# Patient Record
Sex: FEMALE | Race: WHITE | Employment: UNEMPLOYED | URBAN - METROPOLITAN AREA
[De-identification: names, ages, dates, MRNs, and addresses within clinical notes are randomized per-mention and may not be internally consistent; named-entity substitution may affect disease eponyms.]

---

## 2017-01-18 ENCOUNTER — TELEPHONE (OUTPATIENT)
Dept: NEUROLOGY | Facility: CLINIC | Age: 62
End: 2017-01-18

## 2017-01-18 NOTE — TELEPHONE ENCOUNTER
Reason for Call:  Patient is calling in states that she has had a headache since Monday  Please advise recommendations/suggestions on how to get rid of it    Detailed comments: see above    Phone Number Patient can be reached at: Home number on file [phone] (home)    Best Time: any    Can we leave a detailed message on this number? YES    Call taken on 1/18/2017 at 3:31 PM by Angelina Kuhn

## 2017-01-18 NOTE — TELEPHONE ENCOUNTER
"Returned call to Patient:  S-(situation): Headache    B-(background): Last office visit 4/13/16    A-(assessment): Patient reports headache since Monday. Patient reports is taking Topamax BID, has not taken Imitrex. Patient reports light sensitivity, sick to stomach, vomiting, Patient states \"the symptoms I'm having are not abnormal for me when I have a headache.\" Patient states \"this is worst it's been in many year.\" Patient denies vision changes. Patient reports neck and back have been sore, but can not identify a trigger with this headache.     R-(recommendations): Patient has Imitrex ordered by Dr. Lyons, advised Patient to try Imitrex. Notified would route to provider if further recommendations. Recommended if severe pain or vision changes before hears back from clinic to be seen in ED. Pt verbalized understanding and in agreement with plan.   Thank you,  Tequila Nieves  OB[facility]N, [facility] RN        "

## 2017-01-20 ENCOUNTER — OFFICE VISIT (OUTPATIENT)
Dept: FAMILY MEDICINE | Facility: CLINIC | Age: 62
End: 2017-01-20
Payer: COMMERCIAL

## 2017-01-20 VITALS
BODY MASS INDEX: 33.06 KG/M2 | SYSTOLIC BLOOD PRESSURE: 123 MMHG | DIASTOLIC BLOOD PRESSURE: 73 MMHG | OXYGEN SATURATION: 99 % | HEART RATE: 77 BPM | HEIGHT: 60 IN | WEIGHT: 168.4 LBS | TEMPERATURE: 98.1 F

## 2017-01-20 DIAGNOSIS — J32.9 VIRAL SINUSITIS: Primary | ICD-10-CM

## 2017-01-20 DIAGNOSIS — B97.89 VIRAL SINUSITIS: Primary | ICD-10-CM

## 2017-01-20 PROCEDURE — 99213 OFFICE O/P EST LOW 20 MIN: CPT | Performed by: INTERNAL MEDICINE

## 2017-01-20 NOTE — PATIENT INSTRUCTIONS
Your sinus infection may be from a viral infection and may clear up on its own.  Call on Tuesday if your symptoms are still going on, and then we would be more concerned that it is caused from a bacteria and we will send in an antibiotic.

## 2017-01-20 NOTE — PROGRESS NOTES
SUBJECTIVE:                                                    Farzana Hughes is a 61 year old female who presents to clinic today for the following health issues:      ENT Symptoms             Symptoms: cc Present Absent Comment   Fever/Chills   x    Fatigue  x     Muscle Aches   x    Eye Irritation  x     Sneezing  x     Nasal Floyd/Drg  x     Sinus Pressure/Pain x x  Mostly on right   Loss of smell  x     Dental pain   x    Sore Throat   x    Swollen Glands   x    Ear Pain/Fullness  x  Some right side ear pain/fullness   Cough  x  Not too productive   Wheeze   x    Chest Pain   x    Shortness of breath  x  Due to asthma    Rash   x    Other  x  Headache      Symptom duration:  x 5 days    Symptom severity:     Treatments tried:  Flonase, Zyrtec D, salt water nasal rinse and gargle    Contacts:  family w/ similar symptoms        Problem list and histories reviewed & adjusted, as indicated.  Additional history: as documented    Current Outpatient Prescriptions   Medication Sig Dispense Refill     lisinopril (PRINIVIL/ZESTRIL) 20 MG tablet Take 0.5 tablets (10 mg) by mouth daily 45 tablet 3     cetirizine-psuedoePHEDrine (ZYRTEC-D) 5-120 MG per 12 hr tablet Take 1 tablet by mouth 2 times daily 90 tablet 5     montelukast (SINGULAIR) 10 MG tablet Take 1 tablet (10 mg) by mouth as needed at bedtime. 90 tablet 2     DoxePIN (SINEQUAN) 10 MG capsule Take 1 capsule (10 mg) by mouth At Bedtime 90 capsule 3     topiramate (TOPAMAX) 25 MG tablet Take 1 tablet (25 mg) by mouth 2 times daily 60 tablet 11     atorvastatin (LIPITOR) 40 MG tablet Take 1 tablet (40 mg) by mouth daily 90 tablet 3     fluticasone (FLONASE) 50 MCG/ACT nasal spray Spray 2 sprays into both nostrils daily 16 g 1     omeprazole (PRILOSEC) 40 MG capsule Take 1 capsule (40 mg) by mouth daily Take 30-60 minutes before a meal. 90 capsule 1     GARLIC 1000 MG OR CAPS 1 tablet twice daily       albuterol (ALBUTEROL) 108 (90 BASE) MCG/ACT inhaler Inhale 2 puffs  into the lungs every 4 hours as needed 1 Inhaler 1     benzonatate (TESSALON) 200 MG capsule Take 1 capsule (200 mg) by mouth 3 times daily as needed for cough 21 capsule 0     blood glucose monitoring (NO BRAND SPECIFIED) test strip by In Vitro route 2 times daily 1 Box 12     blood glucose monitoring (DARLENE CONTOUR MONITOR) meter device kit Use to test blood sugars two times daily or as directed. 1 kit 0     blood glucose monitoring (DARLENE CONTOUR) test strip Use to test blood sugars two times daily or as directed. 1 Box 11     blood glucose monitoring (DARLENE MICROLET) lancets Use to test blood sugar two times daily or as directed. 1 Box 11     order for DME Hinged knee brace - medium 1 Device 0     order for DME CAM walker - short 1 Device 0     order for DME Equipment being ordered: Hinged knee brace 1 Units 0     order for DME Trilok Ankle Brace 1 Device 0     order for DME Crutches 1 Units 0     olopatadine (PATANOL) 0.1 % ophthalmic solution Place 1 drop into both eyes 2 times daily 5 mL 3     SUMAtriptan (IMITREX) 100 MG tablet Take 1 tablet (100 mg) by mouth See Admin Instructions WITH ONSET OF MIGRAINE, MAY REPEAT ONCE AFTER 2 HOURS. DO NOT EXCEED 2 TABLETS IN 24 HOURS. 16 tablet 6     ASPIRIN NOT PRESCRIBED (INTENTIONAL) Antiplatelet medication not prescribed intentionally due to Allergy 0 each 0     scopolamine (TRANSDERM) (1.5mg base/3day) patch Place 1 patch onto the skin every 72 hours.  Apply to hairless area behind one ear at least 4 hours before travel.  Remove old patch and change every 3 days. 3 patch 0     levalbuterol (XOPENEX) 1.25 MG/3ML nebulizer solution Take 3 mLs (1.25 mg) by nebulization every 4 hours as needed 270 mL 1     scopolamine (TRANSDERM-SCOP) 1.5 MG patch 72 hr Place onto the skin every 72 hours 4 patch 1     beclomethasone (QVAR) 40 MCG/ACT Inhaler Inhale 2 puffs into the lungs 2 times daily 1 Inhaler 8     blood glucose monitoring (NO BRAND SPECIFIED) meter device kit Use to  "test blood sugars two times daily or as directed. 1 kit 0     ONE TOUCH LANCETS MISC 90 Devices 2 times daily 1 Box 0     ORDER FOR DME BP cuff, brand as covered by insurance.  Dx: HTN 1 each 0     Blood Glucose Monitoring Suppl (ACCU-CHEK ELLA) KIT 1 Device daily. 1 kit 0     Allergies   Allergen Reactions     Shellfish Allergy Anaphylaxis     Actifed Plus [Actifed Cold-Sinus]      Advair Diskus Cough     Asa [Aspirin] Nausea     Ok to take 81 mg states larger dose makes her ill 2/17/11     Cephalosporins Nausea and Vomiting     Keflex     Codeine      Dust Mite Extract      Latex      Mildew      Milk Products Swelling     Mold      Peanut [Peanut Oil]      Vioxx Other (See Comments)     Seizures         ROS:  Constitutional, HEENT, pulmonary systems are negative, except as otherwise noted.    OBJECTIVE:                                                    /73 mmHg  Pulse 77  Temp(Src) 98.1  F (36.7  C) (Tympanic)  Ht 4' 11.5\" (1.511 m)  Wt 168 lb 6.4 oz (76.386 kg)  BMI 33.46 kg/m2  SpO2 99%  Body mass index is 33.46 kg/(m^2).    GENERAL: healthy, alert and no distress  EYES: Eyes grossly normal to inspection, PERRL and conjunctivae and sclerae normal  HENT: ear canals and TMs normal, sinuses quite tender to percussion especially on the right side, nose and mouth without ulcers or lesions, poor dentition, oropharynx red but no tonsillar exudates  NECK: no adenopathy, no asymmetry, masses, or scars  RESP: lungs clear to auscultation - no rales, rhonchi or wheezes  CV: regular rate and rhythm, normal S1 S2, no S3 or S4, no murmur, click or rub      Diagnostic Test Results:  none      ASSESSMENT/PLAN:                                                        1. Viral sinusitis    Farzana has had 5 days of sinus symptoms, so symptoms are likely viral at this point.  If not resolved by next Tuesday, would be more concerned about bacterial etiology.  Asked her to call if not better and I would send in an " Augmentin prescription at that time.  In the meantime, continue OTCs as she is doing.    Follow-up as needed.    Meño Donald MD  [facility]

## 2017-01-20 NOTE — NURSING NOTE
"Chief Complaint   Patient presents with     Sinus Problem     x 5 days, pressure above eyes, a lot of mucus       Initial /73 mmHg  Pulse 77  Temp(Src) 98.1  F (36.7  C) (Tympanic)  Ht 4' 11.5\" (1.511 m)  Wt 168 lb 6.4 oz (76.386 kg)  BMI 33.46 kg/m2  SpO2 99% Estimated body mass index is 33.46 kg/(m^2) as calculated from the following:    Height as of this encounter: 4' 11.5\" (1.511 m).    Weight as of this encounter: 168 lb 6.4 oz (76.386 kg).  BP completed using cuff size: bipin ELIZABETH CMA (AAMA)        "

## 2017-01-20 NOTE — MR AVS SNAPSHOT
"              After Visit Summary   1/20/2017    Farzana Hughes    MRN: [id number]           Patient Information     Date Of Birth          1955        Visit Information        Provider Department      1/20/2017 3:20 PM Meño Donald MD [facility]        Care Instructions    Your sinus infection may be from a viral infection and may clear up on its own.  Call on Tuesday if your symptoms are still going on, and then we would be more concerned that it is caused from a bacteria and we will send in an antibiotic.          Follow-ups after your visit        Your next 10 appointments already scheduled     Apr 05, 2017 11:00 AM   [facility]    [address] [address] [phone]              Who to contact     If you have questions or need follow up information about today's clinic visit or your schedule please contact [facility] directly at [phone].  Normal or non-critical lab and imaging results will be communicated to you by [employer], letter or phone within 4 business days after the clinic has received the results. If you do not hear from us within 7 days, please contact the clinic through [facility] or phone. If you have a critical or abnormal lab result, we will notify you by phone as soon as possible.  Submit refill requests through [employer] or call your pharmacy and they will forward the refill request to us. Please allow 3 business days for your refill to be completed.          Additional Information About Your Visit        Whyville[facility]romium Information     [employer] lets you send messages to your doctor, view your test results, renew your prescriptions, schedule appointments and more. To sign up, go to [email or web address] . Click on \"Log in\" on the left side of the screen, which will take you to the Welcome page. Then click on \"Sign up Now\" on the right side of the page.     You will " "be asked to enter the access code listed below, as well as some personal information. Please follow the directions to create your username and password.     Your access code is: [email or web address]  Expires: 2017  3:36 PM     Your access code will  in 90 days. If you need help or a new code, please call your [facility] or [phone].        Care EveryWhere ID     This is your Care EveryWhere ID. This could be used by other organizations to access your [facility] medical records  [id number]        Your Vitals Were     Pulse Temperature Height BMI (Body Mass Index) Pulse Oximetry       77 98.1  F (36.7  C) (Tympanic) 4' 11.5\" (1.511 m) 33.46 kg/m2 99%        Blood Pressure from Last 3 Encounters:   17 123/73   16 113/77   10/12/16 103/72    Weight from Last 3 Encounters:   17 168 lb 6.4 oz (76.386 kg)   16 169 lb 9.6 oz (76.93 kg)   10/12/16 173 lb (78.472 kg)              Today, you had the following     No orders found for display       Primary Care Provider Office Phone # Fax #    Oly Cannon DREW [facility] [phone]       [facility] [address] [address]        Thank you!     Thank you for choosing [facility]  for your care. Our goal is always to provide you with excellent care. Hearing back from our patients is one way we can continue to improve our services. Please take a few minutes to complete the written survey that you may receive in the mail after your visit with us. Thank you!             Your Updated Medication List - Protect others around you: Learn how to safely use, store and throw away your medicines at www.disposemymeds.org.          This list is accurate as of: 17  3:36 PM.  Always use your most recent med list.                   Brand Name Dispense Instructions for use    * ACCU-CHEK ELLA Kit     1 kit    1 Device daily.       * blood glucose monitoring meter device kit    no brand specified    1 kit    Use " to test blood sugars two times daily or as directed.       * blood glucose monitoring meter device kit     1 kit    Use to test blood sugars two times daily or as directed.       albuterol 108 (90 BASE) MCG/ACT Inhaler    albuterol    1 Inhaler    Inhale 2 puffs into the lungs every 4 hours as needed       ASPIRIN NOT PRESCRIBED    INTENTIONAL    0 each    Antiplatelet medication not prescribed intentionally due to Allergy       atorvastatin 40 MG tablet    LIPITOR    90 tablet    Take 1 tablet (40 mg) by mouth daily       beclomethasone 40 MCG/ACT Inhaler    QVAR    1 Inhaler    Inhale 2 puffs into the lungs 2 times daily       benzonatate 200 MG capsule    TESSALON    21 capsule    Take 1 capsule (200 mg) by mouth 3 times daily as needed for cough       * blood glucose monitoring test strip    no brand specified    1 Box    by In Vitro route 2 times daily       * blood glucose monitoring test strip    DARLENE CONTOUR    1 Box    Use to test blood sugars two times daily or as directed.       cetirizine-psuedoePHEDrine 5-120 MG per 12 hr tablet    zyrTEC-D    90 tablet    Take 1 tablet by mouth 2 times daily       doxepin 10 MG capsule    SINEquan    90 capsule    Take 1 capsule (10 mg) by mouth At Bedtime       fluticasone 50 MCG/ACT spray    FLONASE    16 g    Spray 2 sprays into both nostrils daily       Garlic 1000 MG Caps      1 tablet twice daily       levalbuterol 1.25 MG/3ML neb solution    XOPENEX    270 mL    Take 3 mLs (1.25 mg) by nebulization every 4 hours as needed       lisinopril 20 MG tablet    PRINIVIL/ZESTRIL    45 tablet    Take 0.5 tablets (10 mg) by mouth daily       montelukast 10 MG tablet    SINGULAIR    90 tablet    Take 1 tablet (10 mg) by mouth as needed at bedtime.       olopatadine 0.1 % ophthalmic solution    PATANOL    5 mL    Place 1 drop into both eyes 2 times daily       omeprazole 40 MG capsule    priLOSEC    90 capsule    Take 1 capsule (40 mg) by mouth daily Take 30-60 minutes  before a meal.       * ONE TOUCH LANCETS Misc     1 Box    90 Devices 2 times daily       * blood glucose monitoring lancets     1 Box    Use to test blood sugar two times daily or as directed.       order for DME     1 each    BP cuff, brand as covered by insurance.  Dx: HTN       * order for DME     1 Units    Crutches       * order for DME     1 Device    Trilok Ankle Brace       * order for DME     1 Units    Equipment being ordered: Hinged knee brace       * order for DME     1 Device    CAM walker - short       * order for DME     1 Device    Hinged knee brace - medium       * scopolamine 1.5 MG patch 72 hr    TRANSDERM-SCOP    4 patch    Place onto the skin every 72 hours       * scopolamine 72 hr patch    TRANSDERM    3 patch    Place 1 patch onto the skin every 72 hours.  Apply to hairless area behind one ear at least 4 hours before travel.  Remove old patch and change every 3 days.       SUMAtriptan 100 MG tablet    IMITREX    16 tablet    Take 1 tablet (100 mg) by mouth See Admin Instructions WITH ONSET OF MIGRAINE, MAY REPEAT ONCE AFTER 2 HOURS. DO NOT EXCEED 2 TABLETS IN 24 HOURS.       topiramate 25 MG tablet    TOPAMAX    60 tablet    Take 1 tablet (25 mg) by mouth 2 times daily       * Notice:  This list has 14 medication(s) that are the same as other medications prescribed for you. Read the directions carefully, and ask your doctor or other care provider to review them with you.

## 2017-01-23 NOTE — TELEPHONE ENCOUNTER
Pt notified.  Pt also stated she is feeling much better. She reported appt with Dr Lyons in April and will discuss the headaches then.      Kait Dorantes CMA

## 2017-02-14 DIAGNOSIS — K21.9 GASTROESOPHAGEAL REFLUX DISEASE WITHOUT ESOPHAGITIS: ICD-10-CM

## 2017-02-14 NOTE — TELEPHONE ENCOUNTER
Omeprzole      Last Written Prescription Date: 12/24/2015  Last Fill Quantity: 90,  # refills: 1   Last Office Visit with [facility], [facility] or [facility] prescribing provider: 01/20/2017                                         Next 5 appointments (look out 90 days)     Apr 05, 2017 10:00 AM CDT   [facility] with DREW Gardner CNP   [facility]    [address] [address]   [phone]            Apr 05, 2017 11:00 AM CDT   Return Visit with Nivia Lyons MD   [facility]    [address] [address]   [phone]                  Elier HINSON (R)

## 2017-02-17 RX ORDER — OMEPRAZOLE 40 MG/1
40 CAPSULE, DELAYED RELEASE ORAL DAILY
Qty: 90 CAPSULE | Refills: 0 | Status: SHIPPED | OUTPATIENT
Start: 2017-02-17 | End: 2017-05-25

## 2017-04-18 ENCOUNTER — OFFICE VISIT (OUTPATIENT)
Dept: DERMATOLOGY | Facility: CLINIC | Age: 62
End: 2017-04-18
Payer: COMMERCIAL

## 2017-04-18 ENCOUNTER — OFFICE VISIT (OUTPATIENT)
Dept: FAMILY MEDICINE | Facility: CLINIC | Age: 62
End: 2017-04-18
Payer: COMMERCIAL

## 2017-04-18 VITALS
HEIGHT: 60 IN | WEIGHT: 172 LBS | BODY MASS INDEX: 33.77 KG/M2 | HEART RATE: 72 BPM | SYSTOLIC BLOOD PRESSURE: 102 MMHG | DIASTOLIC BLOOD PRESSURE: 66 MMHG | OXYGEN SATURATION: 98 %

## 2017-04-18 VITALS — HEART RATE: 85 BPM | OXYGEN SATURATION: 98 % | DIASTOLIC BLOOD PRESSURE: 80 MMHG | SYSTOLIC BLOOD PRESSURE: 99 MMHG

## 2017-04-18 DIAGNOSIS — E78.5 HYPERLIPIDEMIA LDL GOAL <100: ICD-10-CM

## 2017-04-18 DIAGNOSIS — T14.8XXA EXCORIATION: ICD-10-CM

## 2017-04-18 DIAGNOSIS — Z12.4 SCREENING FOR MALIGNANT NEOPLASM OF CERVIX: ICD-10-CM

## 2017-04-18 DIAGNOSIS — L29.9 ITCHING: ICD-10-CM

## 2017-04-18 DIAGNOSIS — E11.9 TYPE 2 DIABETES, DIET CONTROLLED (H): Primary | ICD-10-CM

## 2017-04-18 DIAGNOSIS — E78.5 HYPERLIPIDEMIA LDL GOAL <130: ICD-10-CM

## 2017-04-18 DIAGNOSIS — I10 HYPERTENSION GOAL BP (BLOOD PRESSURE) < 130/80: ICD-10-CM

## 2017-04-18 LAB
ALT SERPL W P-5'-P-CCNC: 28 U/L (ref 0–50)
ANION GAP SERPL CALCULATED.3IONS-SCNC: 5 MMOL/L (ref 3–14)
BUN SERPL-MCNC: 18 MG/DL (ref 7–30)
CALCIUM SERPL-MCNC: 9.1 MG/DL (ref 8.5–10.1)
CHLORIDE SERPL-SCNC: 108 MMOL/L (ref 94–109)
CHOLEST SERPL-MCNC: 177 MG/DL
CO2 SERPL-SCNC: 29 MMOL/L (ref 20–32)
CREAT SERPL-MCNC: 0.87 MG/DL (ref 0.52–1.04)
CREAT UR-MCNC: 139 MG/DL
GFR SERPL CREATININE-BSD FRML MDRD: 66 ML/MIN/1.7M2
GLUCOSE SERPL-MCNC: 107 MG/DL (ref 70–99)
HDLC SERPL-MCNC: 55 MG/DL
LDLC SERPL CALC-MCNC: 93 MG/DL
MICROALBUMIN UR-MCNC: 54 MG/L
MICROALBUMIN/CREAT UR: 38.85 MG/G CR (ref 0–25)
NONHDLC SERPL-MCNC: 122 MG/DL
POTASSIUM SERPL-SCNC: 4.2 MMOL/L (ref 3.4–5.3)
SODIUM SERPL-SCNC: 142 MMOL/L (ref 133–144)
TRIGL SERPL-MCNC: 144 MG/DL

## 2017-04-18 PROCEDURE — 84460 ALANINE AMINO (ALT) (SGPT): CPT | Performed by: NURSE PRACTITIONER

## 2017-04-18 PROCEDURE — G0145 SCR C/V CYTO,THINLAYER,RESCR: HCPCS | Performed by: NURSE PRACTITIONER

## 2017-04-18 PROCEDURE — 82043 UR ALBUMIN QUANTITATIVE: CPT | Performed by: NURSE PRACTITIONER

## 2017-04-18 PROCEDURE — 80061 LIPID PANEL: CPT | Performed by: NURSE PRACTITIONER

## 2017-04-18 PROCEDURE — 99214 OFFICE O/P EST MOD 30 MIN: CPT | Performed by: NURSE PRACTITIONER

## 2017-04-18 PROCEDURE — 36415 COLL VENOUS BLD VENIPUNCTURE: CPT | Performed by: NURSE PRACTITIONER

## 2017-04-18 PROCEDURE — 99213 OFFICE O/P EST LOW 20 MIN: CPT | Performed by: PHYSICIAN ASSISTANT

## 2017-04-18 PROCEDURE — 87624 HPV HI-RISK TYP POOLED RSLT: CPT | Performed by: NURSE PRACTITIONER

## 2017-04-18 PROCEDURE — 80048 BASIC METABOLIC PNL TOTAL CA: CPT | Performed by: NURSE PRACTITIONER

## 2017-04-18 RX ORDER — ATORVASTATIN CALCIUM 40 MG/1
40 TABLET, FILM COATED ORAL DAILY
Qty: 90 TABLET | Refills: 3 | Status: SHIPPED | OUTPATIENT
Start: 2017-04-18 | End: 2017-11-02

## 2017-04-18 RX ORDER — DOXEPIN HYDROCHLORIDE 10 MG/1
10 CAPSULE ORAL AT BEDTIME
Qty: 90 CAPSULE | Refills: 3 | Status: SHIPPED | OUTPATIENT
Start: 2017-04-18 | End: 2018-03-29

## 2017-04-18 RX ORDER — LISINOPRIL 20 MG/1
10 TABLET ORAL DAILY
Qty: 45 TABLET | Refills: 3 | Status: SHIPPED | OUTPATIENT
Start: 2017-04-18 | End: 2017-08-29

## 2017-04-18 ASSESSMENT — ANXIETY QUESTIONNAIRES
6. BECOMING EASILY ANNOYED OR IRRITABLE: SEVERAL DAYS
7. FEELING AFRAID AS IF SOMETHING AWFUL MIGHT HAPPEN: NOT AT ALL
GAD7 TOTAL SCORE: 3
1. FEELING NERVOUS, ANXIOUS, OR ON EDGE: NOT AT ALL
5. BEING SO RESTLESS THAT IT IS HARD TO SIT STILL: NOT AT ALL
2. NOT BEING ABLE TO STOP OR CONTROL WORRYING: SEVERAL DAYS
3. WORRYING TOO MUCH ABOUT DIFFERENT THINGS: SEVERAL DAYS

## 2017-04-18 ASSESSMENT — PATIENT HEALTH QUESTIONNAIRE - PHQ9: 5. POOR APPETITE OR OVEREATING: NOT AT ALL

## 2017-04-18 NOTE — MR AVS SNAPSHOT
After Visit Summary   4/18/2017    Farzana Hughes    MRN: [id number]           Patient Information     Date Of Birth          1955        Visit Information        Provider Department      4/18/2017 2:20 PM Oly Winston APRN CNP [facility]        Today's Diagnoses     Type 2 diabetes, diet controlled (H)    -  1    Hyperlipidemia LDL goal <100        Hypertension goal BP (blood pressure) < 130/80        Hyperlipidemia LDL goal <130          Care Instructions          Thank you for choosing [facility].  You may be receiving a survey in the mail from Jeremi Benavides regarding your visit today.  Please take a few minutes to complete and return the survey to let us know how we are doing.      If you have questions or concerns, please contact us via Easiest [employer] To Get Approved For or you can contact your care team at [phone].    Our Clinic hours are:  Monday 6:40 am  to 7:00 pm  Tuesday -Friday 6:40 am to 5:00 pm    The [place] outpatient lab hours are:  Monday - Friday 6:10 am to 4:45 pm  Saturdays 7:00 am to 11:00 am  Appointments are required, call [phone]    If you have clinical questions after hours or would like to schedule an appointment,  call the clinic at [phone].        Follow-ups after your visit        Your next 10 appointments already scheduled     Apr 18, 2017  2:20 PM CDT   SHORT with DREW Gardner CNP   [facility]    [address] [address] [phone]            May 24, 2017  2:15 PM CDT   Return Visit with Nivia Lyons MD   [facility]    [address] [address]   [phone]              Who to contact     If you have questions or need follow up information about today's clinic visit or your schedule please contact [facility] directly at [phone].  Normal or non-critical lab and imaging results will be communicated  "to you by FullContacthart, letter or phone within 4 business days after the clinic has received the results. If you do not hear from us within 7 days, please contact the clinic through Covarity or phone. If you have a critical or abnormal lab result, we will notify you by phone as soon as possible.  Submit refill requests through Covarity or call your pharmacy and they will forward the refill request to us. Please allow 3 business days for your refill to be completed.          Additional Information About Your Visit        Covarity Information     Covarity gives you secure access to your electronic health record. If you see a primary care provider, you can also send messages to your care team and make appointments. If you have questions, please call your primary care clinic.  If you do not have a primary care provider, please call [phone] and they will assist you.        Care EveryWhere ID     This is your Care EveryWhere ID. This could be used by other organizations to access your [facility] medical records  [id number]        Your Vitals Were     Pulse Height Pulse Oximetry BMI (Body Mass Index)          72 4' 11.5\" (1.511 m) 98% 34.16 kg/m2         Blood Pressure from Last 3 Encounters:   04/18/17 102/66   04/18/17 99/80   01/20/17 123/73    Weight from Last 3 Encounters:   04/18/17 172 lb (78 kg)   01/20/17 168 lb 6.4 oz (76.4 kg)   11/14/16 169 lb 9.6 oz (76.9 kg)              We Performed the Following     Hemoglobin A1c          Today's Medication Changes          These changes are accurate as of: 4/18/17  2:07 PM.  If you have any questions, ask your nurse or doctor.               These medicines have changed or have updated prescriptions.        Dose/Directions    * blood glucose monitoring test strip   Commonly known as:  DARLENE CONTOUR   This may have changed:  Another medication with the same name was changed. Make sure you understand how and when to take each.   Used for:  Type 2 diabetes, diet controlled (H) "   Changed by:  Oly Winston APRN CNP        Use to test blood sugars two times daily or as directed.   Quantity:  1 Box   Refills:  11       * blood glucose monitoring test strip   Commonly known as:  no brand specified   This may have changed:  how much to take   Used for:  Type 2 diabetes, diet controlled (H)   Changed by:  Oly Winston APRN CNP        Dose:  180 strip   180 strips by In Vitro route 2 times daily   Quantity:  1 Box   Refills:  12       * Notice:  This list has 2 medication(s) that are the same as other medications prescribed for you. Read the directions carefully, and ask your doctor or other care provider to review them with you.         Where to get your medicines      These medications were sent to [address], [place] - [address]  [address], [facility] [address]     Phone:  [phone]     atorvastatin 40 MG tablet    blood glucose monitoring test strip    doxepin 10 MG capsule    lisinopril 20 MG tablet                Primary Care Provider Office Phone # Fax #    DREW Gardner [phone]       [facility] [address] [address]        Thank you!     Thank you for choosing [facility]  for your care. Our goal is always to provide you with excellent care. Hearing back from our patients is one way we can continue to improve our services. Please take a few minutes to complete the written survey that you may receive in the mail after your visit with us. Thank you!             Your Updated Medication List - Protect others around you: Learn how to safely use, store and throw away your medicines at www.disposemymeds.org.          This list is accurate as of: 4/18/17  2:07 PM.  Always use your most recent med list.                   Brand Name Dispense Instructions for use    * ACCU-CHEK ELLA Kit     1 kit    1 Device daily.       * blood glucose monitoring meter device kit    no  brand specified    1 kit    Use to test blood sugars two times daily or as directed.       * blood glucose monitoring meter device kit     1 kit    Use to test blood sugars two times daily or as directed.       albuterol 108 (90 BASE) MCG/ACT Inhaler    albuterol    1 Inhaler    Inhale 2 puffs into the lungs every 4 hours as needed       ASPIRIN NOT PRESCRIBED    INTENTIONAL    0 each    Antiplatelet medication not prescribed intentionally due to Allergy       atorvastatin 40 MG tablet    LIPITOR    90 tablet    Take 1 tablet (40 mg) by mouth daily       beclomethasone 40 MCG/ACT Inhaler    QVAR    1 Inhaler    Inhale 2 puffs into the lungs 2 times daily       * blood glucose monitoring test strip    DARLENE CONTOUR    1 Box    Use to test blood sugars two times daily or as directed.       * blood glucose monitoring test strip    no brand specified    1 Box    180 strips by In Vitro route 2 times daily       cetirizine-psuedoePHEDrine 5-120 MG per 12 hr tablet    zyrTEC-D    90 tablet    Take 1 tablet by mouth 2 times daily       doxepin 10 MG capsule    SINEquan    90 capsule    Take 1 capsule (10 mg) by mouth At Bedtime       fluticasone 50 MCG/ACT spray    FLONASE    16 g    Spray 2 sprays into both nostrils daily       Garlic 1000 MG Caps      1 tablet twice daily       levalbuterol 1.25 MG/3ML neb solution    XOPENEX    270 mL    Take 3 mLs (1.25 mg) by nebulization every 4 hours as needed       lisinopril 20 MG tablet    PRINIVIL/ZESTRIL    45 tablet    Take 0.5 tablets (10 mg) by mouth daily       montelukast 10 MG tablet    SINGULAIR    90 tablet    Take 1 tablet (10 mg) by mouth as needed at bedtime.       olopatadine 0.1 % ophthalmic solution    PATANOL    5 mL    Place 1 drop into both eyes 2 times daily       omeprazole 40 MG capsule    priLOSEC    90 capsule    Take 1 capsule (40 mg) by mouth daily Take 30-60 minutes before a meal.       * ONE TOUCH LANCETS Misc     1 Box    90 Devices 2 times daily        * blood glucose monitoring lancets     1 Box    Use to test blood sugar two times daily or as directed.       order for DME     1 each    BP cuff, brand as covered by insurance.  Dx: HTN       * order for DME     1 Units    Crutches       * order for DME     1 Device    Trilok Ankle Brace       * order for DME     1 Units    Equipment being ordered: Hinged knee brace       * order for DME     1 Device    CAM walker - short       * order for DME     1 Device    Hinged knee brace - medium       scopolamine 1.5 MG patch 72 hr    TRANSDERM-SCOP    4 patch    Place onto the skin every 72 hours       SUMAtriptan 100 MG tablet    IMITREX    16 tablet    Take 1 tablet (100 mg) by mouth See Admin Instructions WITH ONSET OF MIGRAINE, MAY REPEAT ONCE AFTER 2 HOURS. DO NOT EXCEED 2 TABLETS IN 24 HOURS.       topiramate 25 MG tablet    TOPAMAX    60 tablet    Take 1 tablet (25 mg) by mouth 2 times daily       * Notice:  This list has 12 medication(s) that are the same as other medications prescribed for you. Read the directions carefully, and ask your doctor or other care provider to review them with you.

## 2017-04-18 NOTE — PATIENT INSTRUCTIONS
Thank you for choosing [facility].  You may be receiving a survey in the mail from Jeremi Benavides regarding your visit today.  Please take a few minutes to complete and return the survey to let us know how we are doing.      If you have questions or concerns, please contact us via [employer] or you can contact your care team at [phone].    Our Clinic hours are:  Monday 6:40 am  to 7:00 pm  Tuesday -Friday 6:40 am to 5:00 pm    The [place] outpatient lab hours are:  Monday - Friday 6:10 am to 4:45 pm  Saturdays 7:00 am to 11:00 am  Appointments are required, call [phone]    If you have clinical questions after hours or would like to schedule an appointment,  call the clinic at [phone].

## 2017-04-18 NOTE — NURSING NOTE
"Initial /66 (BP Location: Left arm, Patient Position: Chair, Cuff Size: Adult Large)  Pulse 72  Ht 4' 11.5\" (1.511 m)  Wt 172 lb (78 kg)  SpO2 98%  BMI 34.16 kg/m2 Estimated body mass index is 34.16 kg/(m^2) as calculated from the following:    Height as of this encounter: 4' 11.5\" (1.511 m).    Weight as of this encounter: 172 lb (78 kg). .    Azeb Bhardwaj    "

## 2017-04-18 NOTE — NURSING NOTE
"Initial BP 99/80  Pulse 85  SpO2 98% Estimated body mass index is 33.44 kg/(m^2) as calculated from the following:    Height as of 1/20/17: 1.511 m (4' 11.5\").    Weight as of 1/20/17: 76.4 kg (168 lb 6.4 oz). .      "
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
## 2017-04-18 NOTE — MR AVS SNAPSHOT
After Visit Summary   4/18/2017    Farzana Hughes    MRN: [id number]           Patient Information     Date Of Birth          1955        Visit Information        Provider Department      4/18/2017 1:20 PM Petra Tee PA-C [facility]        Today's Diagnoses     Excoriation        Itching           Follow-ups after your visit        Your next 10 appointments already scheduled     May 24, 2017  2:15 PM CDT   Return Visit with Nivia Lyons MD   [facility] ([facility]    [address] [address] [phone]              Who to contact     If you have questions or need follow up information about today's clinic visit or your schedule please contact [facility] directly at [phone].  Normal or non-critical lab and imaging results will be communicated to you by MyChart, letter or phone within 4 business days after the clinic has received the results. If you do not hear from us within 7 days, please contact the clinic through MyChart or phone. If you have a critical or abnormal lab result, we will notify you by phone as soon as possible.  Submit refill requests through [employer] or call your pharmacy and they will forward the refill request to us. Please allow 3 business days for your refill to be completed.          Additional Information About Your Visit        MyChart Information     [employer] gives you secure access to your electronic health record. If you see a primary care provider, you can also send messages to your care team and make appointments. If you have questions, please call your primary care clinic.  If you do not have a primary care provider, please call [phone] and they will assist you.        Care EveryWhere ID     This is your Care EveryWhere ID. This could be used by other organizations to access your [facility] medical records  [id number]        Your Vitals Were     Pulse Pulse  Oximetry                85 98%           Blood Pressure from Last 3 Encounters:   04/18/17 102/66   04/18/17 99/80   01/20/17 123/73    Weight from Last 3 Encounters:   04/18/17 78 kg (172 lb)   01/20/17 76.4 kg (168 lb 6.4 oz)   11/14/16 76.9 kg (169 lb 9.6 oz)              Today, you had the following     No orders found for display         Today's Medication Changes          These changes are accurate as of: 4/18/17  4:49 PM.  If you have any questions, ask your nurse or doctor.               These medicines have changed or have updated prescriptions.        Dose/Directions    * blood glucose monitoring test strip   Commonly known as:  DARLENE CONTOUR   This may have changed:  Another medication with the same name was changed. Make sure you understand how and when to take each.   Used for:  Type 2 diabetes, diet controlled (H)   Changed by:  Oly Winston APRN CNP        Use to test blood sugars two times daily or as directed.   Quantity:  1 Box   Refills:  11       * blood glucose monitoring test strip   Commonly known as:  no brand specified   This may have changed:  how much to take   Used for:  Type 2 diabetes, diet controlled (H)   Changed by:  Oly Winston APRN CNP        Dose:  180 strip   180 strips by In Vitro route 2 times daily   Quantity:  1 Box   Refills:  12       * Notice:  This list has 2 medication(s) that are the same as other medications prescribed for you. Read the directions carefully, and ask your doctor or other care provider to review them with you.         Where to get your medicines      These medications were sent to [facility] [address] [place], [place] [address] [place] [address], [facility] [address]     Phone:  [phone]     atorvastatin 40 MG tablet    blood glucose monitoring test strip    doxepin 10 MG capsule    lisinopril 20 MG tablet                Primary Care Provider Office Phone # Fax #    DREW Gardner [phone]  [phone]       [facility] [address] [address]        Thank you!     Thank you for choosing [facility]  for your care. Our goal is always to provide you with excellent care. Hearing back from our patients is one way we can continue to improve our services. Please take a few minutes to complete the written survey that you may receive in the mail after your visit with us. Thank you!             Your Updated Medication List - Protect others around you: Learn how to safely use, store and throw away your medicines at www.disposemymeds.org.          This list is accurate as of: 4/18/17  4:49 PM.  Always use your most recent med list.                   Brand Name Dispense Instructions for use    * ACCU-CHEK ELLA Kit     1 kit    1 Device daily.       * blood glucose monitoring meter device kit    no brand specified    1 kit    Use to test blood sugars two times daily or as directed.       * blood glucose monitoring meter device kit     1 kit    Use to test blood sugars two times daily or as directed.       albuterol 108 (90 BASE) MCG/ACT Inhaler    albuterol    1 Inhaler    Inhale 2 puffs into the lungs every 4 hours as needed       ASPIRIN NOT PRESCRIBED    INTENTIONAL    0 each    Antiplatelet medication not prescribed intentionally due to Allergy       atorvastatin 40 MG tablet    LIPITOR    90 tablet    Take 1 tablet (40 mg) by mouth daily       beclomethasone 40 MCG/ACT Inhaler    QVAR    1 Inhaler    Inhale 2 puffs into the lungs 2 times daily       * blood glucose monitoring test strip    DARLENE CONTOUR    1 Box    Use to test blood sugars two times daily or as directed.       * blood glucose monitoring test strip    no brand specified    1 Box    180 strips by In Vitro route 2 times daily       cetirizine-psuedoePHEDrine 5-120 MG per 12 hr tablet    zyrTEC-D    90 tablet    Take 1 tablet by mouth 2 times daily       doxepin 10 MG capsule    SINEquan    90 capsule    Take 1  capsule (10 mg) by mouth At Bedtime       fluticasone 50 MCG/ACT spray    FLONASE    16 g    Spray 2 sprays into both nostrils daily       Garlic 1000 MG Caps      1 tablet twice daily       levalbuterol 1.25 MG/3ML neb solution    XOPENEX    270 mL    Take 3 mLs (1.25 mg) by nebulization every 4 hours as needed       lisinopril 20 MG tablet    PRINIVIL/ZESTRIL    45 tablet    Take 0.5 tablets (10 mg) by mouth daily       montelukast 10 MG tablet    SINGULAIR    90 tablet    Take 1 tablet (10 mg) by mouth as needed at bedtime.       olopatadine 0.1 % ophthalmic solution    PATANOL    5 mL    Place 1 drop into both eyes 2 times daily       omeprazole 40 MG capsule    priLOSEC    90 capsule    Take 1 capsule (40 mg) by mouth daily Take 30-60 minutes before a meal.       * ONE TOUCH LANCETS Misc     1 Box    90 Devices 2 times daily       * blood glucose monitoring lancets     1 Box    Use to test blood sugar two times daily or as directed.       order for DME     1 each    BP cuff, brand as covered by insurance.  Dx: HTN       * order for DME     1 Units    Crutches       * order for DME     1 Device    Trilok Ankle Brace       * order for DME     1 Units    Equipment being ordered: Hinged knee brace       * order for DME     1 Device    CAM walker - short       * order for DME     1 Device    Hinged knee brace - medium       scopolamine 1.5 MG patch 72 hr    TRANSDERM-SCOP    4 patch    Place onto the skin every 72 hours       SUMAtriptan 100 MG tablet    IMITREX    16 tablet    Take 1 tablet (100 mg) by mouth See Admin Instructions WITH ONSET OF MIGRAINE, MAY REPEAT ONCE AFTER 2 HOURS. DO NOT EXCEED 2 TABLETS IN 24 HOURS.       topiramate 25 MG tablet    TOPAMAX    60 tablet    Take 1 tablet (25 mg) by mouth 2 times daily       * Notice:  This list has 12 medication(s) that are the same as other medications prescribed for you. Read the directions carefully, and ask your doctor or other care provider to review them  with you.

## 2017-04-18 NOTE — PROGRESS NOTES
SUBJECTIVE:                                                    Farzana Hughes is a 61 year old female who presents to clinic today for the following health issues:        Diabetes Follow-up    Patient is checking blood sugars: twice daily.    Blood sugar testing frequency justification: none  Results are as follows:             Diabetic concerns: None     Symptoms of hypoglycemia (low blood sugar): none     Paresthesias (numbness or burning in feet) or sores: None      Date of last diabetic eye exam: 7/2016  Lab Results   Component Value Date    A1C 6.5 06/15/2016    A1C 6.2 09/21/2015    A1C  01/26/2015     CANNOT ADD ON TEST, NO EDTA TUBE.  PATIENT WILL NEED TO BE REDRAWN.    A1C 6.1 04/13/2011    A1C 6.0 12/15/2009            Hyperlipidemia Follow-Up      Rate your low fat/cholesterol diet?: not monitoring fat    Taking statin?  No    Other lipid medications/supplements?:  none     Hypertension Follow-up      Outpatient blood pressures are not being checked.    Low Salt Diet: some added salt         Amount of exercise or physical activity: none other than daily activities    Problems taking medications regularly: No    Medication side effects: none    Diet: regular (no restrictions)      -------------------------------------    Problem list and histories reviewed & adjusted, as indicated.  Additional history: as documented    Patient Active Problem List   Diagnosis     Esophageal reflux     Hepatitis A virus infection     Backache     Dyspnea and respiratory abnormality     Restless legs syndrome (RLS)     Ovarian cyst     Convulsions (H)     Cerebral embolism with cerebral infarction (H)     Other psoriasis     Moderate persistent asthma     Head injury     Hyperlipidemia LDL goal <100     Acute gastritis     Recurrent major depression in complete remission (H)     Adhesive capsulitis of shoulder     Migraine headache     CTS (carpal tunnel syndrome)     ?Spinal Stenosis     Family history of diabetes  "mellitus     Health Care Home     Type 2 diabetes, diet controlled (H)     Past Surgical History:   Procedure Laterality Date     C STOMACH SURGERY PROCEDURE UNLISTED       TUBAL LIGATION         Social History   Substance Use Topics     Smoking status: Never Smoker     Smokeless tobacco: Never Used     Alcohol use No     Family History   Problem Relation Age of Onset     C.A.D. Mother      since 43yo, 2 cabg and 4 stents     DIABETES Mother      onset at 54yo     Hypertension Mother      HEART DISEASE Mother      GASTROINTESTINAL DISEASE Mother      RENAL FAILURE-DIALYIS     C.A.D. Father      onset at 55-61 yo, CABG and 12 stents     DIABETES Father      onset in 60s, losing eyesight     HEART DISEASE Father      Dementia Father      Alzheimer Disease Father 80     DIABETES Maternal Grandfather      Asthma No family hx of      CEREBROVASCULAR DISEASE No family hx of      Breast Cancer No family hx of      Cancer - colorectal No family hx of      GASTROINTESTINAL DISEASE Maternal Grandmother      Asthma Maternal Grandmother      Dementia Maternal Grandmother      Unknown/Adopted Paternal Grandmother      CANCER Sister      Ovarian     HEART DISEASE Daughter      DIABETES Sister      CEREBROVASCULAR DISEASE Sister      Aneurysm Sister 59     Passed away     DIABETES Sister      Prostate Cancer Maternal Half-Brother            Reviewed and updated as needed this visit by clinical staff  Meds       Reviewed and updated as needed this visit by Provider         ROS:  Constitutional, HEENT, cardiovascular, pulmonary, GI, , musculoskeletal, neuro, skin, endocrine and psych systems are negative, except as otherwise noted.    OBJECTIVE:                                                    /66 (BP Location: Left arm, Patient Position: Chair, Cuff Size: Adult Large)  Pulse 72  Ht 4' 11.5\" (1.511 m)  Wt 172 lb (78 kg)  SpO2 98%  BMI 34.16 kg/m2  Body mass index is 34.16 kg/(m^2).  GENERAL: healthy, alert and no " distress  NECK: no adenopathy, no asymmetry, masses, or scars and thyroid normal to palpation  RESP: lungs clear to auscultation - no rales, rhonchi or wheezes  CV: regular rate and rhythm, normal S1 S2, no S3 or S4, no murmur, click or rub, no peripheral edema and peripheral pulses strong   (female): normal female external genitalia, normal urethral meatus, vaginal mucosa, normal cervix/adnexa/uterus without masses or discharge  MS: no gross musculoskeletal defects noted, no edema    Diagnostic Test Results:  none      ASSESSMENT/PLAN:                                                      1. Type 2 diabetes, diet controlled (H)  Diet controlled  - blood glucose monitoring (NO BRAND SPECIFIED) test strip; 180 strips by In Vitro route 2 times daily  Dispense: 1 Box; Refill: 12  - Hemoglobin A1c    2. Hyperlipidemia LDL goal <130    - atorvastatin (LIPITOR) 40 MG tablet; Take 1 tablet (40 mg) by mouth daily  Dispense: 90 tablet; Refill: 3    Stable- tolerating statin    3. Hypertension goal BP (blood pressure) < 130/80  controlled  - lisinopril (PRINIVIL/ZESTRIL) 20 MG tablet; Take 0.5 tablets (10 mg) by mouth daily  Dispense: 45 tablet; Refill: 3    4. Screening for malignant neoplasm of cervix    - Pap imaged thin layer screen with HPV - recommended age 30 - 65 years (select HPV order below)    Follow up in 6 months    DREW Gardner [facility]

## 2017-04-19 ASSESSMENT — ASTHMA QUESTIONNAIRES: ACT_TOTALSCORE: 24

## 2017-04-19 ASSESSMENT — ANXIETY QUESTIONNAIRES: GAD7 TOTAL SCORE: 3

## 2017-04-19 ASSESSMENT — PATIENT HEALTH QUESTIONNAIRE - PHQ9: SUM OF ALL RESPONSES TO PHQ QUESTIONS 1-9: 2

## 2017-04-20 LAB
COPATH REPORT: NORMAL
PAP: NORMAL

## 2017-04-24 LAB
FINAL DIAGNOSIS: NORMAL
HPV HR 12 DNA CVX QL NAA+PROBE: NEGATIVE
HPV16 DNA SPEC QL NAA+PROBE: NEGATIVE
HPV18 DNA SPEC QL NAA+PROBE: NEGATIVE
SPECIMEN DESCRIPTION: NORMAL

## 2017-05-09 DIAGNOSIS — J45.40 MODERATE PERSISTENT ASTHMA, UNCOMPLICATED: ICD-10-CM

## 2017-05-09 NOTE — TELEPHONE ENCOUNTER
Montelukast      Last Written Prescription Date: 08/04/2016  Last Fill Quantity: 90,  # refills: 2   Last Office Visit with [facility], [facility] or [facility] prescribing provider: 04/18/2017                                         Next 5 appointments (look out 90 days)     May 24, 2017  2:15 PM CDT   [facility]   [facility]    [address] [address]   [phone]                  Elier GrossR)

## 2017-05-10 RX ORDER — MONTELUKAST SODIUM 10 MG/1
TABLET ORAL
Qty: 90 TABLET | Refills: 1 | Status: SHIPPED | OUTPATIENT
Start: 2017-05-10 | End: 2017-08-23

## 2017-05-24 ENCOUNTER — OFFICE VISIT (OUTPATIENT)
Dept: NEUROLOGY | Facility: CLINIC | Age: 62
End: 2017-05-24
Payer: COMMERCIAL

## 2017-05-24 VITALS
HEART RATE: 78 BPM | SYSTOLIC BLOOD PRESSURE: 112 MMHG | TEMPERATURE: 97.9 F | DIASTOLIC BLOOD PRESSURE: 56 MMHG | WEIGHT: 171.2 LBS | BODY MASS INDEX: 34 KG/M2

## 2017-05-24 DIAGNOSIS — G43.009 MIGRAINE WITHOUT AURA AND WITHOUT STATUS MIGRAINOSUS, NOT INTRACTABLE: Primary | ICD-10-CM

## 2017-05-24 DIAGNOSIS — M54.2 CERVICALGIA: ICD-10-CM

## 2017-05-24 DIAGNOSIS — R51.9 OCCIPITAL PAIN: ICD-10-CM

## 2017-05-24 PROCEDURE — 99215 OFFICE O/P EST HI 40 MIN: CPT | Performed by: PSYCHIATRY & NEUROLOGY

## 2017-05-24 RX ORDER — TOPIRAMATE 25 MG/1
50 TABLET, FILM COATED ORAL 2 TIMES DAILY
Qty: 120 TABLET | Refills: 5 | Status: SHIPPED | OUTPATIENT
Start: 2017-05-24 | End: 2018-03-29

## 2017-05-24 RX ORDER — LORAZEPAM 1 MG/1
TABLET ORAL
Qty: 2 TABLET | Refills: 0 | Status: SHIPPED | OUTPATIENT
Start: 2017-05-24 | End: 2020-01-02

## 2017-05-24 ASSESSMENT — PAIN SCALES - GENERAL: PAINLEVEL: SEVERE PAIN (7)

## 2017-05-24 NOTE — MR AVS SNAPSHOT
After Visit Summary   5/24/2017    Farzana Hughes    MRN: [id number]           Patient Information     Date Of Birth          1955        Visit Information        Provider Department      5/24/2017 2:15 PM Nivia Lyons MD [facility]        Today's Diagnoses     Cervicalgia    -  1    Migraine without aura and without status migrainosus, not intractable        Occipital pain          Care Instructions    Plan:    Increase the Topamax to 50mg twice daily.  Continue Imitrex for severe/migraine headache onset.   MRI cervical spine. We will notify you of the results.  Referral to Pain clinic for occipital injections.   Return to clinic in 6 months, or sooner if concerns arise.          Follow-ups after your visit        Additional Services     PAIN MANAGEMENT CENTER ([place]) REFERRAL       Your provider has referred you to the [facility].    Reason for Referral: Procedure or injection only - patient will be contacted within 24 hrs to schedule: Block: Occipital Nerve Block    Please complete the following questions:    What is your diagnosis for the patient's pain? Occipital neuralgia    Do you have any specific questions for the pain specialist? No    Are there any red flags that may impact the assessment or management of the patient? None    **ANY DIAGNOSTIC TESTS THAT ARE NOT IN EPIC SHOULD BE SENT TO THE PAIN CENTER**    Please note the Pre-Op Pain Consult must be scheduled 2-3 weeks prior to the patient's surgery.  Patient's trying to schedule within 2 weeks of surgery may not be accommodated.     Pre-Op Pain Consults are only good for 30 days.    REGARDING OPIOID MEDICATIONS:  We will always address appropriateness of opioid pain medications, but we generally will not automatically take on a prescribing role. When we do take on prescribing of opioids for chronic pain, it is in collaboration with the referring physician for an intermediate period of time  (months), with an expectation that the primary physician or provider will assume the prescribing role if medications are effective at stable doses with demonstrated compliance.  Therefore, please do not assume that your prescribing responsibilities end on the day of pain clinic consultation.  Is this agreeable to you? YES    For any questions, contact the [facility] at [phone].    Please be aware that coverage of these services is subject to the terms and limitations of your health insurance plan.  Call member services at your health plan with any benefit or coverage questions.      Please bring the following with you to your appointment:    (1) Any X-Rays, CTs or MRIs which have been performed.  Contact the facility where they were done to arrange for  prior to your scheduled appointment.    (2) List of current medications   (3) This referral request   (4) Any documents/labs given to you for this referral                  Future tests that were ordered for you today     Open Future Orders        Priority Expected Expires Ordered    MR Cervical Spine w/o Contrast Routine  5/24/2018 5/24/2017            Who to contact     If you have questions or need follow up information about today's clinic visit or your schedule please contact [facility] directly at [phone].  Normal or non-critical lab and imaging results will be communicated to you by MyChart, letter or phone within 4 business days after the clinic has received the results. If you do not hear from us within 7 days, please contact the clinic through MyChart or phone. If you have a critical or abnormal lab result, we will notify you by phone as soon as possible.  Submit refill requests through [employer] or call your pharmacy and they will forward the refill request to us. Please allow 3 business days for your refill to be completed.          Additional Information About Your Visit        MyChart Information      [facility] gives you secure access to your electronic health record. If you see a primary care provider, you can also send messages to your care team and make appointments. If you have questions, please call your primary care clinic.  If you do not have a primary care provider, please call [phone] and they will assist you.        Care EveryWhere ID     This is your Care EveryWhere ID. This could be used by other organizations to access your [place] medical records  [id number]        Your Vitals Were     Pulse Temperature BMI (Body Mass Index)             78 97.9  F (36.6  C) (Oral) 34 kg/m2          Blood Pressure from Last 3 Encounters:   05/24/17 112/56   04/18/17 102/66   04/18/17 99/80    Weight from Last 3 Encounters:   05/24/17 171 lb 3.2 oz (77.7 kg)   04/18/17 172 lb (78 kg)   01/20/17 168 lb 6.4 oz (76.4 kg)              We Performed the Following     [facility] ([place]) REFERRAL          Today's Medication Changes          These changes are accurate as of: 5/24/17  2:43 PM.  If you have any questions, ask your nurse or doctor.               Start taking these medicines.        Dose/Directions    LORazepam 1 MG tablet   Commonly known as:  ATIVAN   Used for:  Cervicalgia, Migraine without aura and without status migrainosus, not intractable, Occipital pain   Started by:  Nivia Lyons MD        Take 1 tablet 30 minutes prior to MRI. Take another 1/2-1 tablet just prior to procedure if needed.  Do not operate a vehicle after taking this medication   Quantity:  2 tablet   Refills:  0         These medicines have changed or have updated prescriptions.        Dose/Directions    olopatadine 0.1 % ophthalmic solution   Commonly known as:  PATANOL   This may have changed:    - when to take this  - reasons to take this   Used for:  Seasonal allergies        Dose:  1 drop   Place 1 drop into both eyes 2 times daily   Quantity:  5 mL   Refills:  3       scopolamine 1.5 MG patch 72 hr   Commonly  known as:  TRANSDERM-SCOP   This may have changed:  additional instructions   Used for:  Travel sickness, sequela        Place onto the skin every 72 hours   Quantity:  4 patch   Refills:  1       topiramate 25 MG tablet   Commonly known as:  TOPAMAX   This may have changed:  how much to take   Used for:  Migraine without aura and without status migrainosus, not intractable   Changed by:  Nivia Lyons MD        Dose:  50 mg   Take 2 tablets (50 mg) by mouth 2 times daily   Quantity:  120 tablet   Refills:  5            Where to get your medicines      These medications were sent to [facility] [address] [place] [address], [place] [address] [facility] [address]  [address] [facility] [address], [facility] [address]     Phone:  [phone]     topiramate 25 MG tablet         Some of these will need a paper prescription and others can be bought over the counter.  Ask your nurse if you have questions.     Bring a paper prescription for each of these medications     LORazepam 1 MG tablet                Primary Care Provider Office Phone # Fax #    Oly DREW [facility] [phone]       [facility] [address] [facility] [address]        Thank you!     Thank you for choosing [facility]  for your care. Our goal is always to provide you with excellent care. Hearing back from our patients is one way we can continue to improve our services. Please take a few minutes to complete the written survey that you may receive in the mail after your visit with us. Thank you!             Your Updated Medication List - Protect others around you: Learn how to safely use, store and throw away your medicines at www.disposemymeds.org.          This list is accurate as of: 5/24/17  2:43 PM.  Always use your most recent med list.                   Brand Name Dispense Instructions for use    * ACCU-CHEK ELLA Kit     1 kit    1 Device daily.       * blood glucose monitoring meter device kit    no brand specified    1  kit    Use to test blood sugars two times daily or as directed.       * blood glucose monitoring meter device kit     1 kit    Use to test blood sugars two times daily or as directed.       albuterol 108 (90 BASE) MCG/ACT Inhaler    albuterol    1 Inhaler    Inhale 2 puffs into the lungs every 4 hours as needed       ASPIRIN NOT PRESCRIBED    INTENTIONAL    0 each    Antiplatelet medication not prescribed intentionally due to Allergy       atorvastatin 40 MG tablet    LIPITOR    90 tablet    Take 1 tablet (40 mg) by mouth daily       beclomethasone 40 MCG/ACT Inhaler    QVAR    1 Inhaler    Inhale 2 puffs into the lungs 2 times daily       * blood glucose monitoring test strip    DARLENE CONTOUR    1 Box    Use to test blood sugars two times daily or as directed.       * blood glucose monitoring test strip    no brand specified    1 Box    180 strips by In Vitro route 2 times daily       cetirizine-psuedoePHEDrine 5-120 MG per 12 hr tablet    zyrTEC-D    90 tablet    Take 1 tablet by mouth 2 times daily       doxepin 10 MG capsule    SINEquan    90 capsule    Take 1 capsule (10 mg) by mouth At Bedtime       fluticasone 50 MCG/ACT spray    FLONASE    16 g    Spray 2 sprays into both nostrils daily       Garlic 1000 MG Caps      1 tablet twice daily       levalbuterol 1.25 MG/3ML neb solution    XOPENEX    270 mL    Take 3 mLs (1.25 mg) by nebulization every 4 hours as needed       lisinopril 20 MG tablet    PRINIVIL/ZESTRIL    45 tablet    Take 0.5 tablets (10 mg) by mouth daily       LORazepam 1 MG tablet    ATIVAN    2 tablet    Take 1 tablet 30 minutes prior to MRI. Take another 1/2-1 tablet just prior to procedure if needed.  Do not operate a vehicle after taking this medication       montelukast 10 MG tablet    SINGULAIR    90 tablet    TAKE 1 TABLET (10 MG) BY MOUTH AS NEEDED AT BEDTIME.       olopatadine 0.1 % ophthalmic solution    PATANOL    5 mL    Place 1 drop into both eyes 2 times daily       omeprazole 40  MG capsule    priLOSEC    90 capsule    Take 1 capsule (40 mg) by mouth daily Take 30-60 minutes before a meal.       * ONE TOUCH LANCETS Misc     1 Box    90 Devices 2 times daily       * blood glucose monitoring lancets     1 Box    Use to test blood sugar two times daily or as directed.       order for DME     1 each    BP cuff, brand as covered by insurance.  Dx: HTN       * order for DME     1 Units    Crutches       * order for DME     1 Device    Trilok Ankle Brace       * order for DME     1 Units    Equipment being ordered: Hinged knee brace       * order for DME     1 Device    CAM walker - short       * order for DME     1 Device    Hinged knee brace - medium       scopolamine 1.5 MG patch 72 hr    TRANSDERM-SCOP    4 patch    Place onto the skin every 72 hours       SUMAtriptan 100 MG tablet    IMITREX    16 tablet    Take 1 tablet (100 mg) by mouth See Admin Instructions WITH ONSET OF MIGRAINE, MAY REPEAT ONCE AFTER 2 HOURS. DO NOT EXCEED 2 TABLETS IN 24 HOURS.       topiramate 25 MG tablet    TOPAMAX    120 tablet    Take 2 tablets (50 mg) by mouth 2 times daily       * Notice:  This list has 12 medication(s) that are the same as other medications prescribed for you. Read the directions carefully, and ask your doctor or other care provider to review them with you.
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
## 2017-05-24 NOTE — NURSING NOTE
"Chief Complaint   Patient presents with     RECHECK     Headaches.  Wonders if previous neck injuries may be contributing.        Initial /56 (BP Location: Right arm, Patient Position: Chair, Cuff Size: Adult Large)  Pulse 78  Temp 97.9  F (36.6  C) (Oral)  Wt 171 lb 3.2 oz (77.7 kg)  BMI 34 kg/m2 Estimated body mass index is 34 kg/(m^2) as calculated from the following:    Height as of 4/18/17: 4' 11.5\" (1.511 m).    Weight as of this encounter: 171 lb 3.2 oz (77.7 kg).  Medication Reconciliation: complete    Patient prefers to be contacted: [phone] or  (Renee) [phone]  Okay to leave detailed message on voicemail: yes for both numbers    Triny Gu NR-CMA    "

## 2017-05-24 NOTE — PATIENT INSTRUCTIONS
Plan:    Increase the Topamax to 50mg twice daily.  Continue Imitrex for severe/migraine headache onset.   MRI cervical spine. We will notify you of the results.  Referral to Pain clinic for occipital injections.   Return to clinic in 6 months, or sooner if concerns arise.

## 2017-05-25 DIAGNOSIS — K21.9 GASTROESOPHAGEAL REFLUX DISEASE WITHOUT ESOPHAGITIS: ICD-10-CM

## 2017-05-25 NOTE — TELEPHONE ENCOUNTER
Omeprazole      Last Written Prescription Date: 02/17/17  Last Fill Quantity: 90,  # refills: 0   Last Office Visit with G, P or [facility] prescribing provider: 04/18/17

## 2017-05-26 ENCOUNTER — RADIANT APPOINTMENT (OUTPATIENT)
Dept: MRI IMAGING | Facility: CLINIC | Age: 62
End: 2017-05-26
Attending: PSYCHIATRY & NEUROLOGY
Payer: COMMERCIAL

## 2017-05-26 DIAGNOSIS — M54.2 CERVICALGIA: ICD-10-CM

## 2017-05-26 DIAGNOSIS — R51.9 OCCIPITAL PAIN: ICD-10-CM

## 2017-05-26 PROCEDURE — 72141 MRI NECK SPINE W/O DYE: CPT | Mod: TC

## 2017-05-31 RX ORDER — OMEPRAZOLE 40 MG/1
CAPSULE, DELAYED RELEASE ORAL
Qty: 90 CAPSULE | Refills: 1 | Status: SHIPPED | OUTPATIENT
Start: 2017-05-31 | End: 2017-08-29

## 2017-05-31 NOTE — TELEPHONE ENCOUNTER
Prescription approved per [facility] Refill Protocol.    Wendy Ashraf RN

## 2017-06-02 NOTE — PROGRESS NOTES
Please advise Farzana Hughes,  1955, that her cervical MRI shows only mild degenerative changes and no appreciable nerve impingement. Continue current plan to see Pain for injections.   [phone] (home)   Nivia Lyons

## 2017-06-27 DIAGNOSIS — J30.2 SEASONAL ALLERGIES: ICD-10-CM

## 2017-06-27 NOTE — TELEPHONE ENCOUNTER
Flonase      Last Written Prescription Date: 12/28/2016  Last Fill Quantity: 16g,  # refills: 1   Last Office Visit with G, [facility] or [facility] prescribing provider: 04/18/2017

## 2017-07-06 RX ORDER — FLUTICASONE PROPIONATE 50 MCG
2 SPRAY, SUSPENSION (ML) NASAL DAILY
Qty: 16 G | Refills: 3 | Status: SHIPPED | OUTPATIENT
Start: 2017-07-06 | End: 2017-08-29

## 2017-07-06 NOTE — TELEPHONE ENCOUNTER
Prescription approved per [facility] Refill Protocol.    Jo Ann ALARCON RN

## 2017-07-17 ENCOUNTER — OFFICE VISIT (OUTPATIENT)
Dept: PALLIATIVE MEDICINE | Facility: CLINIC | Age: 62
End: 2017-07-17
Payer: COMMERCIAL

## 2017-07-17 VITALS
BODY MASS INDEX: 34.75 KG/M2 | HEART RATE: 91 BPM | DIASTOLIC BLOOD PRESSURE: 70 MMHG | WEIGHT: 175 LBS | SYSTOLIC BLOOD PRESSURE: 128 MMHG

## 2017-07-17 DIAGNOSIS — M54.81 OCCIPITAL NEURALGIA OF RIGHT SIDE: Primary | ICD-10-CM

## 2017-07-17 PROCEDURE — 64405 NJX AA&/STRD GR OCPL NRV: CPT | Mod: RT | Performed by: PSYCHIATRY & NEUROLOGY

## 2017-07-17 ASSESSMENT — PAIN SCALES - GENERAL: PAINLEVEL: MODERATE PAIN (4)

## 2017-07-17 NOTE — PATIENT INSTRUCTIONS
[facility]   Post Procedure Instructions    Today you had:     occipital nerve block      Medications used:  lidocaine   bupivicaine   dexamethasone          Go to the emergency room if you develop any shortness of breath    Monitor the injection sites for signs and symptoms of infection-fever, chills, redness, swelling, warmth, or drainage to areas.    You may have soreness at injection sites for up to 24 hours.    You may apply ice to the painful areas to help minimize the discomfort of the needle pokes.    Do not apply heat to sites for at least 12 hours.    You may use anti-inflammatory medications or Tylenol for pain control if necessary  Nurse line: [phone]  After hours provider line: [phone]  Appointment line: [phone]

## 2017-07-17 NOTE — PROGRESS NOTES
Pre procedure Diagnosis: occipital neuralgia   Post procedure Diagnosis: Same  Procedure performed: Right occipital nerve block  Anesthesia: none  Complications: None  Operators: Maya Hernandez MD  And Fabian Hazel DO  Referring provider: Dr. Nivia Lyons    Indications:   Farzana Hughes is a 61 year old female with a history of headaches and occipital neuralgia.  Exam shows tenderness at the right occipital ridge and they have tried conservative treatment including oral medications and previous injections..    Options/alternatives, benefits and risks were discussed with the patient including bleeding, infection, hematoma, nerve damage, stroke, and spinal cord injury.  Questions were answered to her satisfaction and she agrees to proceed. Voluntary informed consent was obtained and signed.     Vitals were reviewed: Yes  Allergies were reviewed:  Yes   Medications were reviewed:  Yes   Pre-procedure pain score: 4/10    Procedure:  After getting informed consent, a Pause for the Cause was performed.    The occipital ridge, occipital protuberance, and mastoid process were palpated on the right side.  The location of maximal tenderness which was consistent with the location of the occipital nerve was palpated.  The area was cleaned.    Palpation for a pulse was completed, with no pulse noted at the site of the injection.  A 25G, 1.5 inch needle was introduced, aimed cephalad, in the superficial tissues at this area of tenderness.  The injection was completed at this location, fanning in 3 different directions.  Aspiration for heme was negative before all injections.    In total, 1 ml of 0.5% bupivacaine1 ml of 1% lidocaine and 10mg dexamethasone was injected.     The patient tolerated the procedure well, hemostasis was achieved.      Post-procedure pain score: 1/10  Follow-up includes:   -f/u with   -schedule prn, can be double booked    Maya Hernandez MD  [facility]

## 2017-07-17 NOTE — NURSING NOTE
"Chief Complaint   Patient presents with     Pain       Initial Wt 79.4 kg (175 lb)  BMI 34.75 kg/m2 Estimated body mass index is 34.75 kg/(m^2) as calculated from the following:    Height as of 4/18/17: 1.511 m (4' 11.5\").    Weight as of this encounter: 79.4 kg (175 lb).  Medication Reconciliation: complete     Rachel Casiano CMA (AAMA)      "

## 2017-07-17 NOTE — MR AVS SNAPSHOT
After Visit Summary   7/17/2017    Farzana Hughes    MRN: [id number]           Patient Information     Date Of Birth          1955        Visit Information        Provider Department      7/17/2017 10:00 AM Ilda Hernandez MD [facility]        Care Instructions    [facility]   Post Procedure Instructions    Today you had:     occipital nerve block      Medications used:  lidocaine   bupivicaine   dexamethasone          Go to the emergency room if you develop any shortness of breath    Monitor the injection sites for signs and symptoms of infection-fever, chills, redness, swelling, warmth, or drainage to areas.    You may have soreness at injection sites for up to 24 hours.    You may apply ice to the painful areas to help minimize the discomfort of the needle pokes.    Do not apply heat to sites for at least 12 hours.    You may use anti-inflammatory medications or Tylenol for pain control if necessary  Nurse line: [phone]  After hours provider line: [phone]  Appointment line: [phone]              Follow-ups after your visit        Your next 10 appointments already scheduled     Nov 27, 2017  1:30 PM CST   [facility] with [facility]ons MD   [facility]    [address] [address] [phone]              Who to contact     If you have questions or need follow up information about today's clinic visit or your schedule please contact [facility] directly at [phone].  Normal or non-critical lab and imaging results will be communicated to you by MyChart, letter or phone within 4 business days after the clinic has received the results. If you do not hear from us within 7 days, please contact the clinic through [employer] or phone. If you have a critical or abnormal lab result, we will notify you by phone as soon as possible.  Submit refill requests through [employer]  or call your pharmacy and they will forward the refill request to us. Please allow 3 business days for your refill to be completed.          Additional Information About Your Visit        Document[employer] Information     [employer] gives you secure access to your electronic health record. If you see a primary care provider, you can also send messages to your care team and make appointments. If you have questions, please call your primary care clinic.  If you do not have a primary care provider, please call [phone] and they will assist you.        Care EveryWhere ID     This is your Care EveryWhere ID. This could be used by other organizations to access your [place] medical records  [id number]        Your Vitals Were     Pulse BMI (Body Mass Index)                91 34.75 kg/m2           Blood Pressure from Last 3 Encounters:   07/17/17 128/70   05/24/17 112/56   04/18/17 102/66    Weight from Last 3 Encounters:   07/17/17 79.4 kg (175 lb)   05/24/17 77.7 kg (171 lb 3.2 oz)   04/18/17 78 kg (172 lb)              Today, you had the following     No orders found for display         Today's Medication Changes          These changes are accurate as of: 7/17/17 10:27 AM.  If you have any questions, ask your nurse or doctor.               These medicines have changed or have updated prescriptions.        Dose/Directions    olopatadine 0.1 % ophthalmic solution   Commonly known as:  PATANOL   This may have changed:    - when to take this  - reasons to take this   Used for:  Seasonal allergies        Dose:  1 drop   Place 1 drop into both eyes 2 times daily   Quantity:  5 mL   Refills:  3                Primary Care Provider Office Phone # Fax #    Oly DREW Her [facility] [phone]       [facility] [address] [place] [id number]        Equal Access to Services     MABLE ALBARADO AH: Vincenzo Wright, fallon galindo, ciarra gillis.  So [facility] [phone].    ATENCIÓN: Si carine du, tiene a coughlin disposición servicios gratuitos de asistencia lingüística. Elder rodriguez [phone].    We comply with applicable federal civil rights laws and [place] laws. We do not discriminate on the basis of race, color, national origin, age, disability sex, sexual orientation or gender identity.            Thank you!     Thank you for choosing [facility]  for your care. Our goal is always to provide you with excellent care. Hearing back from our patients is one way we can continue to improve our services. Please take a few minutes to complete the written survey that you may receive in the mail after your visit with us. Thank you!             Your Updated Medication List - Protect others around you: Learn how to safely use, store and throw away your medicines at www.disposemymeds.org.          This list is accurate as of: 7/17/17 10:27 AM.  Always use your most recent med list.                   Brand Name Dispense Instructions for use Diagnosis    * ACCU-CHEK ELLA Kit     1 kit    1 Device daily.    Family history of diabetes mellitus       * blood glucose monitoring meter device kit    no brand specified    1 kit    Use to test blood sugars two times daily or as directed.    Type 2 diabetes, diet controlled (H)       * blood glucose monitoring meter device kit     1 kit    Use to test blood sugars two times daily or as directed.    Type 2 diabetes, diet controlled (H)       albuterol 108 (90 BASE) MCG/ACT Inhaler    albuterol    1 Inhaler    Inhale 2 puffs into the lungs every 4 hours as needed    Cough, Acute bronchospasm       ASPIRIN NOT PRESCRIBED    INTENTIONAL    0 each    Antiplatelet medication not prescribed intentionally due to Allergy        atorvastatin 40 MG tablet    LIPITOR    90 tablet    Take 1 tablet (40 mg) by mouth daily    Hyperlipidemia LDL goal <130       beclomethasone 40 MCG/ACT Inhaler    QVAR    1 Inhaler    Inhale 2 puffs  into the lungs 2 times daily    Chronic cough       * blood glucose monitoring test strip    DARLENE CONTOUR    1 Box    Use to test blood sugars two times daily or as directed.    Type 2 diabetes, diet controlled (H)       * blood glucose monitoring test strip    no brand specified    1 Box    180 strips by In Vitro route 2 times daily    Type 2 diabetes, diet controlled (H)       cetirizine-psuedoePHEDrine 5-120 MG per 12 hr tablet    zyrTEC-D    90 tablet    Take 1 tablet by mouth 2 times daily    Seasonal allergic rhinitis, unspecified allergic rhinitis trigger       doxepin 10 MG capsule    SINEquan    90 capsule    Take 1 capsule (10 mg) by mouth At Bedtime    Excoriation, Itching       fluticasone 50 MCG/ACT spray    FLONASE    16 g    Spray 2 sprays into both nostrils daily    Seasonal allergies       Garlic 1000 MG Caps      1 tablet twice daily        levalbuterol 1.25 MG/3ML neb solution    XOPENEX    270 mL    Take 3 mLs (1.25 mg) by nebulization every 4 hours as needed    Seasonal allergies       lisinopril 20 MG tablet    PRINIVIL/ZESTRIL    45 tablet    Take 0.5 tablets (10 mg) by mouth daily    Hypertension goal BP (blood pressure) < 130/80       LORazepam 1 MG tablet    ATIVAN    2 tablet    Take 1 tablet 30 minutes prior to MRI. Take another 1/2-1 tablet just prior to procedure if needed.  Do not operate a vehicle after taking this medication    Cervicalgia, Migraine without aura and without status migrainosus, not intractable, Occipital pain       montelukast 10 MG tablet    SINGULAIR    90 tablet    TAKE 1 TABLET (10 MG) BY MOUTH AS NEEDED AT BEDTIME.    Moderate persistent asthma, uncomplicated       olopatadine 0.1 % ophthalmic solution    PATANOL    5 mL    Place 1 drop into both eyes 2 times daily    Seasonal allergies       omeprazole 40 MG capsule    priLOSEC    90 capsule    TAKE 1 CAPSULE (40 MG) BY MOUTH DAILY. TAKE 30 TO 60 MINUTES BEFORE A MEAL    Gastroesophageal reflux disease without  esophagitis       * ONE TOUCH LANCETS Misc     1 Box    90 Devices 2 times daily    Type 2 diabetes, diet controlled (H)       * blood glucose monitoring lancets     1 Box    Use to test blood sugar two times daily or as directed.    Type 2 diabetes, diet controlled (H)       order for DME     1 each    BP cuff, brand as covered by insurance.  Dx: HTN    Benign hypertension       * order for DME     1 Units    Crutches        * order for DME     1 Device    Trilok Ankle Brace    Sprain of right ankle, unspecified ligament, subsequent encounter       * order for DME     1 Units    Equipment being ordered: Hinged knee brace    Arthralgia of right lower leg, Knee injury, right, subsequent encounter       * order for DME     1 Device    CAM walker - short    Extensor tendinitis of foot, Right foot pain       * order for DME     1 Device    Hinged knee brace - medium    Knee injury, right, subsequent encounter, Patellofemoral stress syndrome of right knee, Chondromalacia of patella, right       scopolamine 1.5 MG patch 72 hr    TRANSDERM-SCOP    4 patch    Place onto the skin every 72 hours    Travel sickness, sequela       SUMAtriptan 100 MG tablet    IMITREX    16 tablet    Take 1 tablet (100 mg) by mouth See Admin Instructions WITH ONSET OF MIGRAINE, MAY REPEAT ONCE AFTER 2 HOURS. DO NOT EXCEED 2 TABLETS IN 24 HOURS.    Migraine without aura and without status migrainosus, not intractable       topiramate 25 MG tablet    TOPAMAX    120 tablet    Take 2 tablets (50 mg) by mouth 2 times daily    Migraine without aura and without status migrainosus, not intractable       * Notice:  This list has 12 medication(s) that are the same as other medications prescribed for you. Read the directions carefully, and ask your doctor or other care provider to review them with you.

## 2017-08-01 ENCOUNTER — APPOINTMENT (OUTPATIENT)
Dept: GENERAL RADIOLOGY | Facility: CLINIC | Age: 62
End: 2017-08-01
Attending: FAMILY MEDICINE
Payer: COMMERCIAL

## 2017-08-01 ENCOUNTER — HOSPITAL ENCOUNTER (EMERGENCY)
Facility: CLINIC | Age: 62
Discharge: HOME | End: 2017-08-02
Attending: FAMILY MEDICINE | Admitting: FAMILY MEDICINE
Payer: COMMERCIAL

## 2017-08-01 ENCOUNTER — TELEPHONE (OUTPATIENT)
Dept: FAMILY MEDICINE | Facility: CLINIC | Age: 62
End: 2017-08-01

## 2017-08-01 VITALS
RESPIRATION RATE: 18 BRPM | OXYGEN SATURATION: 98 % | TEMPERATURE: 97.4 F | WEIGHT: 175 LBS | DIASTOLIC BLOOD PRESSURE: 68 MMHG | BODY MASS INDEX: 34.75 KG/M2 | HEART RATE: 91 BPM | SYSTOLIC BLOOD PRESSURE: 113 MMHG

## 2017-08-01 DIAGNOSIS — S16.1XXA CERVICAL STRAIN, INITIAL ENCOUNTER: ICD-10-CM

## 2017-08-01 DIAGNOSIS — S49.91XA RIGHT SHOULDER INJURY, INITIAL ENCOUNTER: ICD-10-CM

## 2017-08-01 PROCEDURE — 73030 X-RAY EXAM OF SHOULDER: CPT | Mod: RT

## 2017-08-01 PROCEDURE — 99283 EMERGENCY DEPT VISIT LOW MDM: CPT

## 2017-08-01 PROCEDURE — 72040 X-RAY EXAM NECK SPINE 2-3 VW: CPT

## 2017-08-01 PROCEDURE — 25000132 ZZH RX MED GY IP 250 OP 250 PS 637: Performed by: FAMILY MEDICINE

## 2017-08-01 PROCEDURE — 99283 EMERGENCY DEPT VISIT LOW MDM: CPT | Performed by: FAMILY MEDICINE

## 2017-08-01 RX ORDER — CYCLOBENZAPRINE HCL 10 MG
10 TABLET ORAL ONCE
Status: COMPLETED | OUTPATIENT
Start: 2017-08-01 | End: 2017-08-01

## 2017-08-01 RX ADMIN — CYCLOBENZAPRINE HYDROCHLORIDE 10 MG: 10 TABLET, FILM COATED ORAL at 23:02
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
## 2017-08-01 NOTE — ED AVS SNAPSHOT
[facility] Emergency Department    [address] [place] [address]    Phone:  [phone]    Fax:  [phone]                                       Farzana Hughes   MRN: [id number]    Department:  [facility] Emergency Department   Date of Visit:  8/1/2017           After Visit Summary Signature Page     I have received my discharge instructions, and my questions have been answered. I have discussed any challenges I see with this plan with the nurse or doctor.    ..........................................................................................................................................  Patient/Patient Representative Signature      ..........................................................................................................................................  Patient Representative Print Name and Relationship to Patient    ..................................................               ................................................  Date                                            Time    ..........................................................................................................................................  Reviewed by Signature/Title    ...................................................              ..............................................  Date                                                            Time
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
## 2017-08-02 RX ORDER — CYCLOBENZAPRINE HCL 10 MG
10 TABLET ORAL 3 TIMES DAILY PRN
Qty: 20 TABLET | Refills: 0 | Status: SHIPPED | OUTPATIENT
Start: 2017-08-02 | End: 2017-08-08

## 2017-08-02 NOTE — DISCHARGE INSTRUCTIONS
Return to the Emergency Room if the following occurs:     Worsened pain, fever >101, new loss of strength or coordination, or for any concern at anytime.    Or, follow-up with the following provider as we discussed:     Return to your primary doctor as needed, or if not improved over the next 7 days.    Medications discussed:    Ibuprofen 600 mg every six hours for pain (7 days duration).  Tylenol 1000 mg every six hours for pain (7 days duration).  Therefore, you can alternate these every three hours and do it safely.  Flexeril for pain not relieved by the above.    If you received pain-relieving or sedating medication during your time in the ER, avoid alcohol, driving automobiles, or working with machinery.  Also, a responsible adult must stay with you.      If you had X-rays or labs done we will attempt to contact you if there is a change needed in your care.      Call the Nurse Advice Line at [phone] or [phone] for any concern at anytime.
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
## 2017-08-11 ENCOUNTER — TRANSFERRED RECORDS (OUTPATIENT)
Dept: HEALTH INFORMATION MANAGEMENT | Facility: CLINIC | Age: 62
End: 2017-08-11

## 2017-08-23 DIAGNOSIS — J45.40 MODERATE PERSISTENT ASTHMA, UNCOMPLICATED: ICD-10-CM

## 2017-08-23 NOTE — TELEPHONE ENCOUNTER
Singulair       Last Written Prescription Date: 5/10/2017  Last Fill Quantity: 90, # refills: 1    Last Office Visit with [facility], [facility] or [facility] prescribing provider:  4/18/2017   Future Office Visit:       Date of Last Asthma Action Plan Letter:   Asthma Action Plan Q1 Year    Topic Date Due     Asthma Action Plan - yearly  01/07/2017      Asthma Control Test:   ACT Total Scores 4/18/2017   ACT TOTAL SCORE -   ASTHMA ER VISITS -   ASTHMA HOSPITALIZATIONS -   ACT TOTAL SCORE (Goal Greater than or Equal to 20) 24   In the past 12 months, how many times did you visit the emergency room for your asthma without being admitted to the hospital? 0   In the past 12 months, how many times were you hospitalized overnight because of your asthma? 0       Date of Last Spirometry Test:   No results found for this or any previous visit.

## 2017-08-25 RX ORDER — MONTELUKAST SODIUM 10 MG/1
TABLET ORAL
Qty: 90 TABLET | Refills: 2 | Status: SHIPPED | OUTPATIENT
Start: 2017-08-25 | End: 2017-08-29

## 2017-08-25 NOTE — TELEPHONE ENCOUNTER
Prescription approved per [facility] Refill Protocol.  Libby RAMIREZ RN

## 2017-08-29 ENCOUNTER — OFFICE VISIT (OUTPATIENT)
Dept: FAMILY MEDICINE | Facility: CLINIC | Age: 62
End: 2017-08-29
Payer: COMMERCIAL

## 2017-08-29 VITALS
BODY MASS INDEX: 35.08 KG/M2 | WEIGHT: 174 LBS | OXYGEN SATURATION: 97 % | HEART RATE: 96 BPM | DIASTOLIC BLOOD PRESSURE: 71 MMHG | SYSTOLIC BLOOD PRESSURE: 117 MMHG | HEIGHT: 59 IN

## 2017-08-29 DIAGNOSIS — Z11.59 ENCOUNTER FOR HCV SCREENING TEST FOR LOW RISK PATIENT: Primary | ICD-10-CM

## 2017-08-29 DIAGNOSIS — J45.40 MODERATE PERSISTENT ASTHMA, UNCOMPLICATED: ICD-10-CM

## 2017-08-29 DIAGNOSIS — I10 HYPERTENSION GOAL BP (BLOOD PRESSURE) < 130/80: ICD-10-CM

## 2017-08-29 DIAGNOSIS — E11.9 TYPE 2 DIABETES, DIET CONTROLLED (H): ICD-10-CM

## 2017-08-29 DIAGNOSIS — K21.9 GASTROESOPHAGEAL REFLUX DISEASE WITHOUT ESOPHAGITIS: ICD-10-CM

## 2017-08-29 LAB — HBA1C MFR BLD: 6.2 % (ref 4.3–6)

## 2017-08-29 PROCEDURE — 83036 HEMOGLOBIN GLYCOSYLATED A1C: CPT | Performed by: NURSE PRACTITIONER

## 2017-08-29 PROCEDURE — 86803 HEPATITIS C AB TEST: CPT | Performed by: NURSE PRACTITIONER

## 2017-08-29 PROCEDURE — 36415 COLL VENOUS BLD VENIPUNCTURE: CPT | Performed by: NURSE PRACTITIONER

## 2017-08-29 PROCEDURE — 99214 OFFICE O/P EST MOD 30 MIN: CPT | Performed by: NURSE PRACTITIONER

## 2017-08-29 RX ORDER — CETIRIZINE HYDROCHLORIDE, PSEUDOEPHEDRINE HYDROCHLORIDE 5; 120 MG/1; MG/1
1 TABLET, FILM COATED, EXTENDED RELEASE ORAL 2 TIMES DAILY
Qty: 90 TABLET | Refills: 5 | Status: SHIPPED | OUTPATIENT
Start: 2017-08-29 | End: 2017-12-29

## 2017-08-29 RX ORDER — FLUTICASONE PROPIONATE 50 MCG
2 SPRAY, SUSPENSION (ML) NASAL DAILY
Qty: 16 G | Refills: 3 | Status: SHIPPED | OUTPATIENT
Start: 2017-08-29 | End: 2018-04-30

## 2017-08-29 RX ORDER — OMEPRAZOLE 40 MG/1
CAPSULE, DELAYED RELEASE ORAL
Qty: 90 CAPSULE | Refills: 3 | Status: SHIPPED | OUTPATIENT
Start: 2017-08-29 | End: 2018-10-10

## 2017-08-29 RX ORDER — MONTELUKAST SODIUM 10 MG/1
TABLET ORAL
Qty: 90 TABLET | Refills: 2 | Status: SHIPPED | OUTPATIENT
Start: 2017-08-29 | End: 2018-03-29

## 2017-08-29 RX ORDER — LISINOPRIL 20 MG/1
10 TABLET ORAL DAILY
Qty: 45 TABLET | Refills: 3 | Status: SHIPPED | OUTPATIENT
Start: 2017-08-29 | End: 2018-08-19

## 2017-08-29 NOTE — MR AVS SNAPSHOT
After Visit Summary   8/29/2017    Farzana Hughes    MRN: [id number]           Patient Information     Date Of Birth          1955        Visit Information        Provider Department      8/29/2017 1:20 PM Oly Winston APRN CNP [facility]        Today's Diagnoses     Encounter for HCV screening test for low risk patient    -  1    Moderate persistent asthma, uncomplicated        Gastroesophageal reflux disease without esophagitis        Hypertension goal BP (blood pressure) < 130/80        Type 2 diabetes, diet controlled (H)          Care Instructions          Thank you for choosing [facility]  You may be receiving a survey in the mail from [employer] regarding your visit today.  Please take a few minutes to complete and return the survey to let us know how we are doing.      If you have questions or concerns, please contact us via [employer] or you can contact your care team at [phone].    Our Clinic hours are:  Monday 6:40 am  to 7:00 pm  Tuesday -Friday 6:40 am to 5:00 pm    The [place] outpatient lab hours are:  Monday - Friday 6:10 am to 4:45 pm  Saturdays 7:00 am to 11:00 am  Appointments are required, call [phone]    If you have clinical questions after hours or would like to schedule an appointment,  call the clinic at [phone].          Follow-ups after your visit        Who to contact     If you have questions or need follow up information about today's clinic visit or your schedule please contact [facility] directly at [phone].  Normal or non-critical lab and imaging results will be communicated to you by [employer], letter or phone within 4 business days after the clinic has received the results. If you do not hear from us within 7 days, please contact the clinic through [employer] or phone. If you have a critical or abnormal lab result, we will notify you by phone as soon as possible.  Submit refill requests through [employer] or call  "your pharmacy and they will forward the refill request to us. Please allow 3 business days for your refill to be completed.          Additional Information About Your Visit        [employer] Information     [employer] gives you secure access to your electronic health record. If you see a primary care provider, you can also send messages to your care team and make appointments. If you have questions, please call your primary care clinic.  If you do not have a primary care provider, please call [phone] and they will assist you.        Care EveryWhere ID     This is your Care EveryWhere ID. This could be used by other organizations to access your [facility] medical records  [id number]        Your Vitals Were     Pulse Height Pulse Oximetry BMI (Body Mass Index)          96 4' 11\" (1.499 m) 97% 35.14 kg/m2         Blood Pressure from Last 3 Encounters:   08/29/17 117/71   08/01/17 113/68   07/17/17 128/70    Weight from Last 3 Encounters:   08/29/17 174 lb (78.9 kg)   08/01/17 175 lb (79.4 kg)   07/17/17 175 lb (79.4 kg)              We Performed the Following     Asthma Action Plan (AAP)     DEPRESSION ACTION PLAN (DAP)     Hemoglobin A1c     Hepatitis C antibody          Today's Medication Changes          These changes are accurate as of: 8/29/17  1:29 PM.  If you have any questions, ask your nurse or doctor.               These medicines have changed or have updated prescriptions.        Dose/Directions    montelukast 10 MG tablet   Commonly known as:  SINGULAIR   This may have changed:  See the new instructions.   Used for:  Moderate persistent asthma, uncomplicated        TAKE ONE TABLET BY MOUTH AT BEDTIME   Quantity:  90 tablet   Refills:  2       omeprazole 40 MG capsule   Commonly known as:  priLOSEC   This may have changed:  See the new instructions.   Used for:  Gastroesophageal reflux disease without esophagitis        TAKE 1 CAPSULE (40 MG) BY MOUTH DAILY. TAKE 30 TO 60 MINUTES BEFORE A MEAL   Quantity:  90 " capsule   Refills:  3            Where to get your medicines      These medications were sent to [facility] - [facility], [place] - [address]  [address], [facility] [place] [address]     Phone:  [phone]     blood glucose monitoring test strip    fluticasone 50 MCG/ACT spray    lisinopril 20 MG tablet    montelukast 10 MG tablet    omeprazole 40 MG capsule         Some of these will need a paper prescription and others can be bought over the counter.  Ask your nurse if you have questions.     Bring a paper prescription for each of these medications     cetirizine-psuedoePHEDrine 5-120 MG per 12 hr tablet                Primary Care Provider Office Phone # Fax #    DREW Gardner [facility] [phone] [address] [address]        Equal Access to Services     MABLE ALBARADO : Vincenzo clancy Socaron, waaxda luqadaha, qaybta kaalmada gemma, ciarra weller . So [facility] [phone].    ATENCIÓN: Si habla español, tiene a coughlin disposición servicios gratuitos de asistencia lingüística. Elder al [phone].    We comply with applicable federal civil rights laws and [place] laws. We do not discriminate on the basis of race, color, national origin, age, disability sex, sexual orientation or gender identity.            Thank you!     Thank you for choosing [facility]  for your care. Our goal is always to provide you with excellent care. Hearing back from our patients is one way we can continue to improve our services. Please take a few minutes to complete the written survey that you may receive in the mail after your visit with us. Thank you!             Your Updated Medication List - Protect others around you: Learn how to safely use, store and throw away your medicines at www.disposemymeds.org.          This list is accurate as of: 8/29/17  1:29 PM.  Always use your most recent med list.                   Brand  Name Dispense Instructions for use Diagnosis    * ACCU-CHEK ELLA Kit     1 kit    1 Device daily.    Family history of diabetes mellitus       * blood glucose monitoring meter device kit    no brand specified    1 kit    Use to test blood sugars two times daily or as directed.    Type 2 diabetes, diet controlled (H)       * blood glucose monitoring meter device kit     1 kit    Use to test blood sugars two times daily or as directed.    Type 2 diabetes, diet controlled (H)       albuterol 108 (90 BASE) MCG/ACT Inhaler    PROAIR HFA    1 Inhaler    Inhale 2 puffs into the lungs every 4 hours as needed    Cough, Acute bronchospasm       ASPIRIN NOT PRESCRIBED    INTENTIONAL    0 each    Antiplatelet medication not prescribed intentionally due to Allergy        atorvastatin 40 MG tablet    LIPITOR    90 tablet    Take 1 tablet (40 mg) by mouth daily    Hyperlipidemia LDL goal <130       beclomethasone 40 MCG/ACT Inhaler    QVAR    1 Inhaler    Inhale 2 puffs into the lungs 2 times daily    Chronic cough       * blood glucose monitoring test strip    DARLENE CONTOUR    1 Box    Use to test blood sugars two times daily or as directed.    Type 2 diabetes, diet controlled (H)       * blood glucose monitoring test strip    no brand specified    1 Box    180 strips by In Vitro route 2 times daily    Type 2 diabetes, diet controlled (H)       cetirizine-psuedoePHEDrine 5-120 MG per 12 hr tablet    zyrTEC-D    90 tablet    Take 1 tablet by mouth 2 times daily    Moderate persistent asthma, uncomplicated       doxepin 10 MG capsule    SINEquan    90 capsule    Take 1 capsule (10 mg) by mouth At Bedtime    Excoriation, Itching       fluticasone 50 MCG/ACT spray    FLONASE    16 g    Spray 2 sprays into both nostrils daily    Moderate persistent asthma, uncomplicated       Garlic 1000 MG Caps      1 tablet twice daily        levalbuterol 1.25 MG/3ML neb solution    XOPENEX    270 mL    Take 3 mLs (1.25 mg) by nebulization every 4  hours as needed    Seasonal allergies       lisinopril 20 MG tablet    PRINIVIL/ZESTRIL    45 tablet    Take 0.5 tablets (10 mg) by mouth daily    Hypertension goal BP (blood pressure) < 130/80       LORazepam 1 MG tablet    ATIVAN    2 tablet    Take 1 tablet 30 minutes prior to MRI. Take another 1/2-1 tablet just prior to procedure if needed.  Do not operate a vehicle after taking this medication    Cervicalgia, Migraine without aura and without status migrainosus, not intractable, Occipital pain       montelukast 10 MG tablet    SINGULAIR    90 tablet    TAKE ONE TABLET BY MOUTH AT BEDTIME    Moderate persistent asthma, uncomplicated       omeprazole 40 MG capsule    priLOSEC    90 capsule    TAKE 1 CAPSULE (40 MG) BY MOUTH DAILY. TAKE 30 TO 60 MINUTES BEFORE A MEAL    Gastroesophageal reflux disease without esophagitis       * ONE TOUCH LANCETS Misc     1 Box    90 Devices 2 times daily    Type 2 diabetes, diet controlled (H)       * blood glucose monitoring lancets     1 Box    Use to test blood sugar two times daily or as directed.    Type 2 diabetes, diet controlled (H)       order for DME     1 each    BP cuff, brand as covered by insurance.  Dx: HTN    Benign hypertension       * order for DME     1 Units    Crutches        * order for DME     1 Device    Trilok Ankle Brace    Sprain of right ankle, unspecified ligament, subsequent encounter       * order for DME     1 Units    Equipment being ordered: Hinged knee brace    Arthralgia of right lower leg, Knee injury, right, subsequent encounter       * order for DME     1 Device    CAM walker - short    Extensor tendinitis of foot, Right foot pain       * order for DME     1 Device    Hinged knee brace - medium    Knee injury, right, subsequent encounter, Patellofemoral stress syndrome of right knee, Chondromalacia of patella, right       scopolamine 1.5 MG patch 72 hr    TRANSDERM-SCOP    4 patch    Place onto the skin every 72 hours    Travel sickness,  sequela       topiramate 25 MG tablet    TOPAMAX    120 tablet    Take 2 tablets (50 mg) by mouth 2 times daily    Migraine without aura and without status migrainosus, not intractable       * Notice:  This list has 12 medication(s) that are the same as other medications prescribed for you. Read the directions carefully, and ask your doctor or other care provider to review them with you.

## 2017-08-29 NOTE — NURSING NOTE
"Initial /71 (BP Location: Left arm, Patient Position: Left side, Cuff Size: Adult Large)  Pulse 96  Ht 4' 11\" (1.499 m)  Wt 174 lb (78.9 kg)  SpO2 97%  BMI 35.14 kg/m2 Estimated body mass index is 35.14 kg/(m^2) as calculated from the following:    Height as of this encounter: 4' 11\" (1.499 m).    Weight as of this encounter: 174 lb (78.9 kg). .    Azeb Bhardwaj    "

## 2017-08-29 NOTE — LETTER
August 30, 2017      Farzana Hughes  [address] [facility] [place] [address]        Dear ,    We are writing to inform you of your test results.    Hepatitis C is negative.    Resulted Orders   Hepatitis C antibody   Result Value Ref Range    Hepatitis C Antibody Nonreactive NR^Nonreactive      Comment:      Assay performance characteristics have not been established for newborns,   infants, and children     Hemoglobin A1c   Result Value Ref Range    Hemoglobin A1C 6.2 (H) 4.3 - 6.0 %       If you have any questions or concerns, please call the clinic at the number listed above.       Sincerely,        DREW Gardner CNP

## 2017-08-29 NOTE — PROGRESS NOTES
SUBJECTIVE:   Farzana Hughes is a 61 year old female who presents to clinic today for the following health issues:      Diabetes Follow-up      Patient is checking blood sugars: rarely.  Results range from 90 to 129    Diabetic concerns: None     Symptoms of hypoglycemia (low blood sugar): none     Paresthesias (numbness or burning in feet) or sores: No     Date of last diabetic eye exam: one year ago  Lab Results   Component Value Date    A1C 6.5 06/15/2016    A1C 6.2 09/21/2015    A1C  01/26/2015     CANNOT ADD ON TEST, NO EDTA TUBE.  PATIENT WILL NEED TO BE REDRAWN.    A1C 6.1 04/13/2011    A1C 6.0 12/15/2009           Hyperlipidemia Follow-Up      Rate your low fat/cholesterol diet?: good    Taking statin?  Yes, no muscle aches from statin    Other lipid medications/supplements?:  None  LDL Cholesterol Calculated   Date Value Ref Range Status   04/18/2017 93 <100 mg/dL Final     Comment:     Desirable:       <100 mg/dl     ]  HDL Cholesterol   Date Value Ref Range Status   04/18/2017 55 >49 mg/dL Final     ]        Hypertension Follow-up      Outpatient blood pressures are not being checked.    Low Salt Diet: no added salt  BP Readings from Last 3 Encounters:   08/29/17 117/71   08/01/17 113/68   07/17/17 128/70         Asthma Follow-Up    Was ACT completed today?    Yes    ACT Total Scores 4/18/2017   ACT TOTAL SCORE -   ASTHMA ER VISITS -   ASTHMA HOSPITALIZATIONS -   ACT TOTAL SCORE (Goal Greater than or Equal to 20) 24   In the past 12 months, how many times did you visit the emergency room for your asthma without being admitted to the hospital? 0   In the past 12 months, how many times were you hospitalized overnight because of your asthma? 0       Recent asthma triggers that patient is dealing with: pollens, humidity and cold air  * Amount of exercise or physical activity: None    Problems taking medications regularly: No    Medication side effects: none  Diet: diabetic    GERD: controlled with  Omeprazole      -------------------------------------    Problem list and histories reviewed & adjusted, as indicated.  Additional history: as documented    Patient Active Problem List   Diagnosis     Esophageal reflux     Hepatitis A virus infection     Backache     Dyspnea and respiratory abnormality     Restless legs syndrome (RLS)     Ovarian cyst     Convulsions (H)     Cerebral embolism with cerebral infarction (H)     Other psoriasis     Moderate persistent asthma     Head injury     Hyperlipidemia LDL goal <100     Acute gastritis     Recurrent major depression in complete remission (H)     Adhesive capsulitis of shoulder     Migraine headache     CTS (carpal tunnel syndrome)     ?Spinal Stenosis     Family history of diabetes mellitus     Health Care Home     Type 2 diabetes, diet controlled (H)     Past Surgical History:   Procedure Laterality Date     C STOMACH SURGERY PROCEDURE UNLISTED       TUBAL LIGATION         Social History   Substance Use Topics     Smoking status: Never Smoker     Smokeless tobacco: Never Used     Alcohol use No     Family History   Problem Relation Age of Onset     C.A.D. Mother      since 45yo, 2 cabg and 4 stents     DIABETES Mother      onset at 56yo     Hypertension Mother      HEART DISEASE Mother      GASTROINTESTINAL DISEASE Mother      RENAL FAILURE-DIALYIS     C.A.D. Father      onset at 55-59 yo, CABG and 12 stents     DIABETES Father      onset in 60s, losing eyesight     HEART DISEASE Father      Dementia Father      Alzheimer Disease Father 80     DIABETES Maternal Grandfather      Asthma No family hx of      CEREBROVASCULAR DISEASE No family hx of      Breast Cancer No family hx of      Cancer - colorectal No family hx of      GASTROINTESTINAL DISEASE Maternal Grandmother      Asthma Maternal Grandmother      Dementia Maternal Grandmother      Unknown/Adopted Paternal Grandmother      CANCER Sister      Ovarian     HEART DISEASE Daughter      DIABETES Sister       "CEREBROVASCULAR DISEASE Sister      Aneurysm Sister 59     Passed away     DIABETES Sister      Prostate Cancer Maternal Half-Brother              Reviewed and updated as needed this visit by clinical staff     Reviewed and updated as needed this visit by Provider         ROS:  Constitutional, HEENT, cardiovascular, pulmonary, gi and gu systems are negative, except as otherwise noted.      OBJECTIVE:   /71 (BP Location: Left arm, Patient Position: Left side, Cuff Size: Adult Large)  Pulse 96  Ht 4' 11\" (1.499 m)  Wt 174 lb (78.9 kg)  SpO2 97%  BMI 35.14 kg/m2  Body mass index is 35.14 kg/(m^2).  GENERAL: alert, no distress and over weight  RESP: lungs clear to auscultation - no rales, rhonchi or wheezes  CV: regular rate and rhythm, normal S1 S2, no S3 or S4, no murmur, click or rub, no peripheral edema and peripheral pulses strong  MS: no gross musculoskeletal defects noted, no edema  Diabetic foot exam: normal DP and PT pulses, no trophic changes or ulcerative lesions, normal sensory exam and normal monofilament exam    Diagnostic Test Results:  none     ASSESSMENT/PLAN:       1. Moderate persistent asthma, uncomplicated  stable  - montelukast (SINGULAIR) 10 MG tablet; TAKE ONE TABLET BY MOUTH AT BEDTIME  Dispense: 90 tablet; Refill: 2  - fluticasone (FLONASE) 50 MCG/ACT spray; Spray 2 sprays into both nostrils daily  Dispense: 16 g; Refill: 3  - cetirizine-psuedoePHEDrine (ZYRTEC-D) 5-120 MG per 12 hr tablet; Take 1 tablet by mouth 2 times daily  Dispense: 90 tablet; Refill: 5  - continue to use QVAR daily and Xopenex prn    2. Gastroesophageal reflux disease without esophagitis  controlled  - omeprazole (PRILOSEC) 40 MG capsule; TAKE 1 CAPSULE (40 MG) BY MOUTH DAILY. TAKE 30 TO 60 MINUTES BEFORE A MEAL  Dispense: 90 capsule; Refill: 3    3. Hypertension goal BP (blood pressure) < 130/80  controlled  - lisinopril (PRINIVIL/ZESTRIL) 20 MG tablet; Take 0.5 tablets (10 mg) by mouth daily  Dispense: 45 " tablet; Refill: 3    4. Type 2 diabetes, diet controlled (H)  Diet controlled  - blood glucose monitoring (NO BRAND SPECIFIED) test strip; 180 strips by In Vitro route 2 times daily  Dispense: 1 Box; Refill: 12  - Hemoglobin A1c  - monitor with diet and exercise    5. Encounter for HCV screening test for low risk patient    - Hepatitis C antibody    Follow up in 6 months    DREW Gardner [facility]

## 2017-08-29 NOTE — LETTER
August 29, 2017      Farzana Hughes  [address] [place] [facility] [place] [address]        Dear ,    We are writing to inform you of your test results.    A1C is stable- continue to watch diet- avoid sugary foods/carbs to help control diabetes.    Resulted Orders   Hemoglobin A1c   Result Value Ref Range    Hemoglobin A1C 6.2 (H) 4.3 - 6.0 %       If you have any questions or concerns, please call the clinic at the number listed above.       Sincerely,        DREW Gardner CNP

## 2017-08-29 NOTE — PATIENT INSTRUCTIONS
Thank you for choosing [facility].  You may be receiving a survey in the mail from Jeremi Benavides regarding your visit today.  Please take a few minutes to complete and return the survey to let us know how we are doing.      If you have questions or concerns, please contact us via [employer] or you can contact your care team at [phone].    Our Clinic hours are:  Monday 6:40 am  to 7:00 pm  Tuesday -Friday 6:40 am to 5:00 pm    The [place] outpatient lab hours are:  Monday - Friday 6:10 am to 4:45 pm  Saturdays 7:00 am to 11:00 am  Appointments are required, call [phone]    If you have clinical questions after hours or would like to schedule an appointment,  call the clinic at [phone].

## 2017-08-30 LAB — HCV AB SERPL QL IA: NONREACTIVE

## 2017-08-30 ASSESSMENT — ASTHMA QUESTIONNAIRES: ACT_TOTALSCORE: 22

## 2017-09-11 ENCOUNTER — OFFICE VISIT (OUTPATIENT)
Dept: FAMILY MEDICINE | Facility: CLINIC | Age: 62
End: 2017-09-11
Payer: COMMERCIAL

## 2017-09-11 ENCOUNTER — RADIANT APPOINTMENT (OUTPATIENT)
Dept: GENERAL RADIOLOGY | Facility: CLINIC | Age: 62
End: 2017-09-11
Attending: NURSE PRACTITIONER
Payer: COMMERCIAL

## 2017-09-11 VITALS
DIASTOLIC BLOOD PRESSURE: 80 MMHG | BODY MASS INDEX: 35.82 KG/M2 | TEMPERATURE: 98.5 F | SYSTOLIC BLOOD PRESSURE: 127 MMHG | HEART RATE: 90 BPM | HEIGHT: 59 IN | WEIGHT: 177.7 LBS

## 2017-09-11 DIAGNOSIS — S99.922A INJURY OF LEFT FOOT, INITIAL ENCOUNTER: Primary | ICD-10-CM

## 2017-09-11 DIAGNOSIS — S99.922A INJURY OF LEFT FOOT, INITIAL ENCOUNTER: ICD-10-CM

## 2017-09-11 PROCEDURE — 99213 OFFICE O/P EST LOW 20 MIN: CPT | Performed by: NURSE PRACTITIONER

## 2017-09-11 PROCEDURE — 73630 X-RAY EXAM OF FOOT: CPT | Mod: LT

## 2017-09-11 NOTE — NURSING NOTE
"Chief Complaint   Patient presents with     Musculoskeletal Problem     Left foot pain since 9/2, fell down down a flight of cement stairs        Initial /80  Pulse 90  Temp 98.5  F (36.9  C) (Tympanic)  Ht 4' 11\" (1.499 m)  Wt 177 lb 11.2 oz (80.6 kg)  BMI 35.89 kg/m2 Estimated body mass index is 35.89 kg/(m^2) as calculated from the following:    Height as of this encounter: 4' 11\" (1.499 m).    Weight as of this encounter: 177 lb 11.2 oz (80.6 kg).  Medication Reconciliation: complete  "

## 2017-09-11 NOTE — PROGRESS NOTES
"  SUBJECTIVE:   Farzana Hughes is a 62 year old female who presents to clinic today for the following health issues: left foot pain and swelling, fell from stairs on 9/2. States pain and swelling is better, she have been applying ice, elevating her leg and was using foot boot that she had at home from previous injury.     Foot Pain    Onset: Since 9/2    Description:   Location: left foot   Character: Sharp pain, hurts when she walks or tries to put any pressure on it, is currently wearing a boot and using her walker     Intensity: 5/10, if she takes the boot off 10/10    Progression of Symptoms: Twisted it last night going down the basement stairs, pain got worse     Accompanying Signs & Symptoms:  Other symptoms: none    History:   Previous similar pain: no       Precipitating factors:   Trauma or overuse: no     Alleviating factors:  Improved by: nothing    Therapies Tried and outcome: Advil at night, tylenol during the day     Problem list and histories reviewed & adjusted, as indicated.  Additional history: as documented    Xray reviewed in EPIC    Reviewed and updated as needed this visit by clinical staffTobacco  Allergies  Med Hx  Surg Hx  Fam Hx  Soc Hx      Reviewed and updated as needed this visit by Provider         ROS:  Constitutional, HEENT, cardiovascular, pulmonary, gi and gu systems are negative, except as otherwise noted.      OBJECTIVE:   /80  Pulse 90  Temp 98.5  F (36.9  C) (Tympanic)  Ht 4' 11\" (1.499 m)  Wt 177 lb 11.2 oz (80.6 kg)  BMI 35.89 kg/m2  Body mass index is 35.89 kg/(m^2).  GENERAL: healthy, alert and no distress  MS: top pf the left foot is mildly swollen, tender to palpation. Left Ankle full ROM, no edema noted   PSYCH: mentation appears normal, affect normal/bright    Diagnostic Test Results:  CXR - negative for fractures    ASSESSMENT/PLAN:     1. Injury of left foot, initial encounter  - XR Foot Left G/E 3 Views; Future-normal, no fractures noted  -ACE " wraps  -elevate leg  -Tylenol, or Ibuprofen as needed for pain     See Patient Instructions    DREW Galan [facility]

## 2017-09-11 NOTE — PATIENT INSTRUCTIONS
Xray no fractures    Can wear ACE wraps    Tylenol and Advil for pain control    Ice packs as needed until swelling goes down

## 2017-09-11 NOTE — MR AVS SNAPSHOT
After Visit Summary   9/11/2017    Farzana Hughes    MRN: [id number]           Patient Information     Date Of Birth          1955        Visit Information        Provider Department      9/11/2017 1:20 PM Liv Morejon APRN CNP [facility]        Today's Diagnoses     Injury of left foot, initial encounter    -  1      Care Instructions    Xray no fractures    Can wear ACE wraps    Tylenol and Advil for pain control    Ice packs as needed until swelling goes down           Follow-ups after your visit        Your next 10 appointments already scheduled     Sep 18, 2017 10:40 AM CDT   [facility] with Jonh Calderon DPM   [facility] ([facility])    [address]  [address]  [place] [facility] [place] [address]   [phone]            Apr 03, 2018  1:20 PM CDT   SHORT with DREW Gardner CNP   [facility] ([facility])    [address] [address]   [phone]              Who to contact     If you have questions or need follow up information about today's clinic visit or your schedule please contact [facility] directly at [phone].  Normal or non-critical lab and imaging results will be communicated to you by [employer], letter or phone within 4 business days after the clinic has received the results. If you do not hear from us within 7 days, please contact the clinic through [employer] or phone. If you have a critical or abnormal lab result, we will notify you by phone as soon as possible.  Submit refill requests through [employer] or call your pharmacy and they will forward the refill request to us. Please allow 3 business days for your refill to be completed.          Additional Information About Your Visit        [employer] Information     [employer] gives you secure access to your electronic health record. If you see a primary care provider, you can also send  "messages to your care team and make appointments. If you have questions, please call your primary care clinic.  If you do not have a primary care provider, please call [phone] and they will assist you.        Care EveryWhere ID     This is your Care EveryWhere ID. This could be used by other organizations to access your [place] medical records  [id number]        Your Vitals Were     Pulse Temperature Height BMI (Body Mass Index)          90 98.5  F (36.9  C) (Tympanic) 4' 11\" (1.499 m) 35.89 kg/m2         Blood Pressure from Last 3 Encounters:   09/11/17 127/80   08/29/17 117/71   08/01/17 113/68    Weight from Last 3 Encounters:   09/11/17 177 lb 11.2 oz (80.6 kg)   08/29/17 174 lb (78.9 kg)   08/01/17 175 lb (79.4 kg)               Primary Care Provider Office Phone # Fax #    Oly Winston, APRN [facility] [phone] [address] [address]        Equal Access to Services     MABLE ALBARADO : Hadii michelle carrasco hadmilo Socaron, waaxda luqadaha, qaybta kaalmada gemma, ciarra weller . So [facility] [phone].    ATENCIÓN: Si habla español, tiene a coughlin disposición servicios gratuitos de asistencia lingüística. Llame al [phone].    We comply with applicable federal civil rights laws and [place] laws. We do not discriminate on the basis of race, color, national origin, age, disability sex, sexual orientation or gender identity.            Thank you!     Thank you for choosing [facility]  for your care. Our goal is always to provide you with excellent care. Hearing back from our patients is one way we can continue to improve our services. Please take a few minutes to complete the written survey that you may receive in the mail after your visit with us. Thank you!             Your Updated Medication List - Protect others around you: Learn how to safely use, store and throw away your medicines at www.disposemymeds.org.          This list is " accurate as of: 9/11/17  1:52 PM.  Always use your most recent med list.                   Brand Name Dispense Instructions for use Diagnosis    * ACCU-CHEK ELLA Kit     1 kit    1 Device daily.    Family history of diabetes mellitus       * blood glucose monitoring meter device kit    no brand specified    1 kit    Use to test blood sugars two times daily or as directed.    Type 2 diabetes, diet controlled (H)       * blood glucose monitoring meter device kit     1 kit    Use to test blood sugars two times daily or as directed.    Type 2 diabetes, diet controlled (H)       albuterol 108 (90 BASE) MCG/ACT Inhaler    PROAIR HFA    1 Inhaler    Inhale 2 puffs into the lungs every 4 hours as needed    Cough, Acute bronchospasm       ASPIRIN NOT PRESCRIBED    INTENTIONAL    0 each    Antiplatelet medication not prescribed intentionally due to Allergy        atorvastatin 40 MG tablet    LIPITOR    90 tablet    Take 1 tablet (40 mg) by mouth daily    Hyperlipidemia LDL goal <130       beclomethasone 40 MCG/ACT Inhaler    QVAR    1 Inhaler    Inhale 2 puffs into the lungs 2 times daily    Chronic cough       * blood glucose monitoring test strip    DARLENE CONTOUR    1 Box    Use to test blood sugars two times daily or as directed.    Type 2 diabetes, diet controlled (H)       * blood glucose monitoring test strip    no brand specified    1 Box    180 strips by In Vitro route 2 times daily    Type 2 diabetes, diet controlled (H)       cetirizine-psuedoePHEDrine 5-120 MG per 12 hr tablet    zyrTEC-D    90 tablet    Take 1 tablet by mouth 2 times daily    Moderate persistent asthma, uncomplicated       doxepin 10 MG capsule    SINEquan    90 capsule    Take 1 capsule (10 mg) by mouth At Bedtime    Excoriation, Itching       fluticasone 50 MCG/ACT spray    FLONASE    16 g    Spray 2 sprays into both nostrils daily    Moderate persistent asthma, uncomplicated       Garlic 1000 MG Caps      1 tablet twice daily         levalbuterol 1.25 MG/3ML neb solution    XOPENEX    270 mL    Take 3 mLs (1.25 mg) by nebulization every 4 hours as needed    Seasonal allergies       lisinopril 20 MG tablet    PRINIVIL/ZESTRIL    45 tablet    Take 0.5 tablets (10 mg) by mouth daily    Hypertension goal BP (blood pressure) < 130/80       LORazepam 1 MG tablet    ATIVAN    2 tablet    Take 1 tablet 30 minutes prior to MRI. Take another 1/2-1 tablet just prior to procedure if needed.  Do not operate a vehicle after taking this medication    Cervicalgia, Migraine without aura and without status migrainosus, not intractable, Occipital pain       montelukast 10 MG tablet    SINGULAIR    90 tablet    TAKE ONE TABLET BY MOUTH AT BEDTIME    Moderate persistent asthma, uncomplicated       omeprazole 40 MG capsule    priLOSEC    90 capsule    TAKE 1 CAPSULE (40 MG) BY MOUTH DAILY. TAKE 30 TO 60 MINUTES BEFORE A MEAL    Gastroesophageal reflux disease without esophagitis       * ONE TOUCH LANCETS Misc     1 Box    90 Devices 2 times daily    Type 2 diabetes, diet controlled (H)       * blood glucose monitoring lancets     1 Box    Use to test blood sugar two times daily or as directed.    Type 2 diabetes, diet controlled (H)       order for DME     1 each    BP cuff, brand as covered by insurance.  Dx: HTN    Benign hypertension       * order for DME     1 Units    Crutches        * order for DME     1 Device    Trilok Ankle Brace    Sprain of right ankle, unspecified ligament, subsequent encounter       * order for DME     1 Units    Equipment being ordered: Hinged knee brace    Arthralgia of right lower leg, Knee injury, right, subsequent encounter       * order for DME     1 Device    CAM walker - short    Extensor tendinitis of foot, Right foot pain       * order for DME     1 Device    Hinged knee brace - medium    Knee injury, right, subsequent encounter, Patellofemoral stress syndrome of right knee, Chondromalacia of patella, right       scopolamine  1.5 MG patch 72 hr    TRANSDERM-SCOP    4 patch    Place onto the skin every 72 hours    Travel sickness, sequela       topiramate 25 MG tablet    TOPAMAX    120 tablet    Take 2 tablets (50 mg) by mouth 2 times daily    Migraine without aura and without status migrainosus, not intractable       * Notice:  This list has 12 medication(s) that are the same as other medications prescribed for you. Read the directions carefully, and ask your doctor or other care provider to review them with you.

## 2017-09-18 ENCOUNTER — OFFICE VISIT (OUTPATIENT)
Dept: PODIATRY | Facility: CLINIC | Age: 62
End: 2017-09-18
Payer: COMMERCIAL

## 2017-09-18 DIAGNOSIS — S93.602A SPRAIN OF LEFT FOOT, INITIAL ENCOUNTER: Primary | ICD-10-CM

## 2017-09-18 PROCEDURE — 99214 OFFICE O/P EST MOD 30 MIN: CPT | Performed by: PODIATRIST
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
## 2017-09-18 NOTE — MR AVS SNAPSHOT
After Visit Summary   9/18/2017    Farzana Hughes    MRN: [id number]           Patient Information     Date Of Birth          1955        Visit Information        Provider Department      9/18/2017 10:40 AM Jonh Calderon DPM [facility]        Today's Diagnoses     Sprain of left foot, initial encounter    -  1      Care Instructions    Contrast Soaking    1.  Fill one basin with warm water with Epson's salt and a second basin with ice water  2.  Soak the foot first in ice water for 5 minutes.  3   Place the foot in the warm water for 5 minutes.  4.  Repeat cold, hot, and finish with a cold soak.    Perform this morning and night.            Follow-ups after your visit        Follow-up notes from your care team     Return in about 4 weeks (around 10/16/2017).      Your next 10 appointments already scheduled     Apr 03, 2018  1:20 PM CDT   [facility] with DREW Gardner CNP   [facility]    [address] [place] [id number] [phone]              Who to contact     If you have questions or need follow up information about today's clinic visit or your schedule please contact [facility] directly at [phone].  Normal or non-critical lab and imaging results will be communicated to you by [employer], letter or phone within 4 business days after the clinic has received the results. If you do not hear from us within 7 days, please contact the clinic through [employer] or phone. If you have a critical or abnormal lab result, we will notify you by phone as soon as possible.  Submit refill requests through [employer] or call your pharmacy and they will forward the refill request to us. Please allow 3 business days for your refill to be completed.          Additional Information About Your Visit        [employer] Information     [employer] gives you secure access to your electronic health  record. If you see a primary care provider, you can also send messages to your care team and make appointments. If you have questions, please call your primary care clinic.  If you do not have a primary care provider, please call [phone] and they will assist you.        Care EveryWhere ID     This is your Care EveryWhere ID. This could be used by other organizations to access your [place] medical records  [id number]         Blood Pressure from Last 3 Encounters:   09/11/17 127/80   08/29/17 117/71   08/01/17 113/68    Weight from Last 3 Encounters:   09/11/17 80.6 kg (177 lb 11.2 oz)   08/29/17 78.9 kg (174 lb)   08/01/17 79.4 kg (175 lb)              Today, you had the following     No orders found for display       Primary Care Provider Office Phone # Fax #    DREW Gardner [facility] [phone] [address] [place] [address]        Equal Access to Services     MABLE ALBARADO : Hadii aad ku hadasho Soomaali, waaxda luqadaha, qaybta kaalmada adeegyada, waxay idiin hayerican alesia khla weller . So [facility] [phone].    ATENCIÓN: Si habla español, tiene a coughlin disposición servicios gratuitos de asistencia lingüística. Llame al [phone].    We comply with applicable federal civil rights laws and [place] laws. We do not discriminate on the basis of race, color, national origin, age, disability sex, sexual orientation or gender identity.            Thank you!     Thank you for choosing [facility]  for your care. Our goal is always to provide you with excellent care. Hearing back from our patients is one way we can continue to improve our services. Please take a few minutes to complete the written survey that you may receive in the mail after your visit with us. Thank you!             Your Updated Medication List - Protect others around you: Learn how to safely use, store and throw away your medicines at www.disposemymeds.org.          This list is  accurate as of: 9/18/17 11:04 AM.  Always use your most recent med list.                   Brand Name Dispense Instructions for use Diagnosis    * ACCU-CHEK ELLA Kit     1 kit    1 Device daily.    Family history of diabetes mellitus       * blood glucose monitoring meter device kit    no brand specified    1 kit    Use to test blood sugars two times daily or as directed.    Type 2 diabetes, diet controlled (H)       * blood glucose monitoring meter device kit     1 kit    Use to test blood sugars two times daily or as directed.    Type 2 diabetes, diet controlled (H)       albuterol 108 (90 BASE) MCG/ACT Inhaler    PROAIR HFA    1 Inhaler    Inhale 2 puffs into the lungs every 4 hours as needed    Cough, Acute bronchospasm       ASPIRIN NOT PRESCRIBED    INTENTIONAL    0 each    Antiplatelet medication not prescribed intentionally due to Allergy        atorvastatin 40 MG tablet    LIPITOR    90 tablet    Take 1 tablet (40 mg) by mouth daily    Hyperlipidemia LDL goal <130       beclomethasone 40 MCG/ACT Inhaler    QVAR    1 Inhaler    Inhale 2 puffs into the lungs 2 times daily    Chronic cough       * blood glucose monitoring test strip    DARLENE CONTOUR    1 Box    Use to test blood sugars two times daily or as directed.    Type 2 diabetes, diet controlled (H)       * blood glucose monitoring test strip    no brand specified    1 Box    180 strips by In Vitro route 2 times daily    Type 2 diabetes, diet controlled (H)       cetirizine-psuedoePHEDrine 5-120 MG per 12 hr tablet    zyrTEC-D    90 tablet    Take 1 tablet by mouth 2 times daily    Moderate persistent asthma, uncomplicated       doxepin 10 MG capsule    SINEquan    90 capsule    Take 1 capsule (10 mg) by mouth At Bedtime    Excoriation, Itching       fluticasone 50 MCG/ACT spray    FLONASE    16 g    Spray 2 sprays into both nostrils daily    Moderate persistent asthma, uncomplicated       Garlic 1000 MG Caps      1 tablet twice daily         levalbuterol 1.25 MG/3ML neb solution    XOPENEX    270 mL    Take 3 mLs (1.25 mg) by nebulization every 4 hours as needed    Seasonal allergies       lisinopril 20 MG tablet    PRINIVIL/ZESTRIL    45 tablet    Take 0.5 tablets (10 mg) by mouth daily    Hypertension goal BP (blood pressure) < 130/80       LORazepam 1 MG tablet    ATIVAN    2 tablet    Take 1 tablet 30 minutes prior to MRI. Take another 1/2-1 tablet just prior to procedure if needed.  Do not operate a vehicle after taking this medication    Cervicalgia, Migraine without aura and without status migrainosus, not intractable, Occipital pain       montelukast 10 MG tablet    SINGULAIR    90 tablet    TAKE ONE TABLET BY MOUTH AT BEDTIME    Moderate persistent asthma, uncomplicated       omeprazole 40 MG capsule    priLOSEC    90 capsule    TAKE 1 CAPSULE (40 MG) BY MOUTH DAILY. TAKE 30 TO 60 MINUTES BEFORE A MEAL    Gastroesophageal reflux disease without esophagitis       * ONE TOUCH LANCETS Misc     1 Box    90 Devices 2 times daily    Type 2 diabetes, diet controlled (H)       * blood glucose monitoring lancets     1 Box    Use to test blood sugar two times daily or as directed.    Type 2 diabetes, diet controlled (H)       order for DME     1 each    BP cuff, brand as covered by insurance.  Dx: HTN    Benign hypertension       * order for DME     1 Units    Crutches        * order for DME     1 Device    Trilok Ankle Brace    Sprain of right ankle, unspecified ligament, subsequent encounter       * order for DME     1 Units    Equipment being ordered: Hinged knee brace    Arthralgia of right lower leg, Knee injury, right, subsequent encounter       * order for DME     1 Device    CAM walker - short    Extensor tendinitis of foot, Right foot pain       * order for DME     1 Device    Hinged knee brace - medium    Knee injury, right, subsequent encounter, Patellofemoral stress syndrome of right knee, Chondromalacia of patella, right       scopolamine  1.5 MG patch 72 hr    TRANSDERM-SCOP    4 patch    Place onto the skin every 72 hours    Travel sickness, sequela       topiramate 25 MG tablet    TOPAMAX    120 tablet    Take 2 tablets (50 mg) by mouth 2 times daily    Migraine without aura and without status migrainosus, not intractable       * Notice:  This list has 12 medication(s) that are the same as other medications prescribed for you. Read the directions carefully, and ask your doctor or other care provider to review them with you.
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
## 2017-09-18 NOTE — PATIENT INSTRUCTIONS
Contrast Soaking    1.  Fill one basin with warm water with Epson's salt and a second basin with ice water  2.  Soak the foot first in ice water for 5 minutes.  3   Place the foot in the warm water for 5 minutes.  4.  Repeat cold, hot, and finish with a cold soak.    Perform this morning and night.

## 2017-09-25 ENCOUNTER — TRANSFERRED RECORDS (OUTPATIENT)
Dept: HEALTH INFORMATION MANAGEMENT | Facility: CLINIC | Age: 62
End: 2017-09-25

## 2017-10-10 ENCOUNTER — TRANSFERRED RECORDS (OUTPATIENT)
Dept: HEALTH INFORMATION MANAGEMENT | Facility: CLINIC | Age: 62
End: 2017-10-10

## 2017-10-11 ENCOUNTER — OFFICE VISIT (OUTPATIENT)
Dept: FAMILY MEDICINE | Facility: CLINIC | Age: 62
End: 2017-10-11
Payer: COMMERCIAL

## 2017-10-11 VITALS
OXYGEN SATURATION: 97 % | WEIGHT: 178 LBS | TEMPERATURE: 98.2 F | DIASTOLIC BLOOD PRESSURE: 73 MMHG | BODY MASS INDEX: 35.95 KG/M2 | SYSTOLIC BLOOD PRESSURE: 111 MMHG | HEART RATE: 82 BPM

## 2017-10-11 DIAGNOSIS — S61.218A LACERATION OF INDEX FINGER WITHOUT FOREIGN BODY, NAIL DAMAGE STATUS UNSPECIFIED, UNSPECIFIED LATERALITY, INITIAL ENCOUNTER: Primary | ICD-10-CM

## 2017-10-11 PROCEDURE — 99213 OFFICE O/P EST LOW 20 MIN: CPT | Performed by: NURSE PRACTITIONER

## 2017-10-11 ASSESSMENT — ANXIETY QUESTIONNAIRES
7. FEELING AFRAID AS IF SOMETHING AWFUL MIGHT HAPPEN: SEVERAL DAYS
2. NOT BEING ABLE TO STOP OR CONTROL WORRYING: MORE THAN HALF THE DAYS
5. BEING SO RESTLESS THAT IT IS HARD TO SIT STILL: NOT AT ALL
6. BECOMING EASILY ANNOYED OR IRRITABLE: NOT AT ALL
1. FEELING NERVOUS, ANXIOUS, OR ON EDGE: MORE THAN HALF THE DAYS
3. WORRYING TOO MUCH ABOUT DIFFERENT THINGS: SEVERAL DAYS
GAD7 TOTAL SCORE: 6

## 2017-10-11 ASSESSMENT — PATIENT HEALTH QUESTIONNAIRE - PHQ9: 5. POOR APPETITE OR OVEREATING: NOT AT ALL

## 2017-10-11 NOTE — NURSING NOTE
"Initial /73 (BP Location: Left arm, Patient Position: Chair, Cuff Size: Adult Large)  Pulse 82  Temp 98.2  F (36.8  C) (Tympanic)  Wt 178 lb (80.7 kg)  SpO2 97%  BMI 35.95 kg/m2 Estimated body mass index is 35.95 kg/(m^2) as calculated from the following:    Height as of 9/11/17: 4' 11\" (1.499 m).    Weight as of this encounter: 178 lb (80.7 kg). .    Azeb Bhardwaj    "

## 2017-10-11 NOTE — PATIENT INSTRUCTIONS
Thank you for choosing [facility].  You may be receiving a survey in the mail from Jeremi Benavides regarding your visit today.  Please take a few minutes to complete and return the survey to let us know how we are doing.      If you have questions or concerns, please contact us via enVista or you can contact your care team at [phone].    Our Clinic hours are:  Monday 6:40 am  to 7:00 pm  Tuesday -Friday 6:40 am to 5:00 pm    The [place] outpatient lab hours are:  Monday - Friday 6:10 am to 4:45 pm  Saturdays 7:00 am to 11:00 am  Appointments are required, call [phone]    If you have clinical questions after hours or would like to schedule an appointment,  call the clinic at [phone].

## 2017-10-11 NOTE — PROGRESS NOTES
SUBJECTIVE:   Farzana Hughes is a 62 year old female who presents to clinic today for the following health issues:      C/O laceration left index finger - 2 weeks ago      Duration: 9/24/17- 17 days ago    Description (location/character/radiation): laceration from a  at home    Intensity:  moderate    Accompanying signs and symptoms: discomfort, shocking type pain    History (similar episodes/previous evaluation): None    Precipitating or alleviating factors: None    Therapies tried and outcome: Tylenol    Patient cut her finger at home 17 days ago- did not go into ER for sutures as she did not think that the cut was very deep. She applied band aid and finger started healing. She is concerned that she may have an infections- but denies redness, drainage or swelling of the finger. Concerned that she may have cut a tendon because she has some pain with bending her finger.          -------------------------------------    Problem list and histories reviewed & adjusted, as indicated.  Additional history: as documented    Patient Active Problem List   Diagnosis     Esophageal reflux     Hepatitis A virus infection     Backache     Dyspnea and respiratory abnormality     Restless legs syndrome (RLS)     Ovarian cyst     Convulsions (H)     Cerebral embolism with cerebral infarction (H)     Other psoriasis     Moderate persistent asthma     Head injury     Hyperlipidemia LDL goal <100     Acute gastritis     Recurrent major depression in complete remission (H)     Adhesive capsulitis of shoulder     Migraine headache     CTS (carpal tunnel syndrome)     ?Spinal Stenosis     Family history of diabetes mellitus     Health Care Home     Type 2 diabetes, diet controlled (H)     Past Surgical History:   Procedure Laterality Date     C STOMACH SURGERY PROCEDURE UNLISTED       TUBAL LIGATION         Social History   Substance Use Topics     Smoking status: Never Smoker     Smokeless tobacco: Never Used     Alcohol  use No     Family History   Problem Relation Age of Onset     C.A.D. Mother      since 45yo, 2 cabg and 4 stents     DIABETES Mother      onset at 54yo     Hypertension Mother      HEART DISEASE Mother      GASTROINTESTINAL DISEASE Mother      RENAL FAILURE-DIALYIS     C.A.D. Father      onset at 55-61 yo, CABG and 12 stents     DIABETES Father      onset in 60s, losing eyesight     HEART DISEASE Father      Dementia Father      Alzheimer Disease Father 80     DIABETES Maternal Grandfather      GASTROINTESTINAL DISEASE Maternal Grandmother      Asthma Maternal Grandmother      Dementia Maternal Grandmother      Unknown/Adopted Paternal Grandmother      CANCER Sister      Ovarian     HEART DISEASE Daughter      DIABETES Sister      CEREBROVASCULAR DISEASE Sister      Aneurysm Sister 59     Passed away     DIABETES Sister      Prostate Cancer Maternal Half-Brother      Breast Cancer No family hx of      Cancer - colorectal No family hx of              Reviewed and updated as needed this visit by clinical staff     Reviewed and updated as needed this visit by Provider         ROS:  Constitutional, HEENT, cardiovascular, pulmonary, GI, , musculoskeletal, neuro, skin, endocrine and psych systems are negative, except as otherwise noted.      OBJECTIVE:   /73 (BP Location: Left arm, Patient Position: Chair, Cuff Size: Adult Large)  Pulse 82  Temp 98.2  F (36.8  C) (Tympanic)  Wt 178 lb (80.7 kg)  SpO2 97%  BMI 35.95 kg/m2  Body mass index is 35.95 kg/(m^2).  GENERAL APPEARANCE: healthy, alert and no distress  RESP: lungs clear to auscultation - no rales, rhonchi or wheezes  CV: regular rates and rhythm, normal S1 S2, no S3 or S4 and no murmur, click or rub  SKIN: Left index finger with healing scar at tip of finger- no redness, swelling or drainage noted;   DIP,PIP, MCP with normal movement    Diagnostic Test Results:  none     ASSESSMENT/PLAN:       1. Laceration of index finger without foreign body, nail  damage status unspecified, unspecified laterality, initial encounter  Patient had laceration of finger tip 17 days ago- patient did not receive sutures or go to ER because she did not feel cut was deep.   Reassured patient that she does not have any infection- that the finger is healing as expected with normal appearing /healing scar  Patient has full movement of her finger- assured patient that at this time I do not have concerns of tendon involvement.   Continue to apply bacitracin daily to scar-  Keep covered when cooking.     15 min spent in direct face to face time with this patient, greater than 50% in counseling and coordination of care.  Discussing laceration and follow up cares.       DREW Gardner [facility]

## 2017-10-12 ASSESSMENT — ANXIETY QUESTIONNAIRES: GAD7 TOTAL SCORE: 6

## 2017-11-02 ENCOUNTER — OFFICE VISIT (OUTPATIENT)
Dept: PODIATRY | Facility: CLINIC | Age: 62
End: 2017-11-02
Payer: COMMERCIAL

## 2017-11-02 ENCOUNTER — TELEPHONE (OUTPATIENT)
Dept: FAMILY MEDICINE | Facility: CLINIC | Age: 62
End: 2017-11-02

## 2017-11-02 VITALS — BODY MASS INDEX: 35.88 KG/M2 | HEART RATE: 80 BPM | RESPIRATION RATE: 18 BRPM | WEIGHT: 178 LBS | HEIGHT: 59 IN

## 2017-11-02 DIAGNOSIS — E78.5 HYPERLIPIDEMIA LDL GOAL <130: ICD-10-CM

## 2017-11-02 DIAGNOSIS — S93.602A SPRAIN OF LEFT FOOT, INITIAL ENCOUNTER: Primary | ICD-10-CM

## 2017-11-02 PROCEDURE — 99212 OFFICE O/P EST SF 10 MIN: CPT | Performed by: PODIATRIST

## 2017-11-02 RX ORDER — RIZATRIPTAN BENZOATE 5 MG/1
10 TABLET ORAL
Refills: 3 | COMMUNITY
Start: 2017-08-12 | End: 2021-04-15

## 2017-11-02 RX ORDER — ATORVASTATIN CALCIUM 40 MG/1
40 TABLET, FILM COATED ORAL DAILY
Qty: 90 TABLET | Refills: 1 | Status: SHIPPED | OUTPATIENT
Start: 2017-11-02 | End: 2017-12-29

## 2017-11-02 NOTE — TELEPHONE ENCOUNTER
I spoke with pt.    Pt explains that she had never been able to tolerate milk or milk products.  She assumed it was the calcium.  Milk and calcium-containing products upset her stomach.  Pt has been on Lipitor since 2013.  Pt is not sure when, but ever since she was changed to generic atorvastatin, she has had an upset stomach.  She read the label, and noted that there is calcium in the tablet.    Pt went off atorvastatin for one week and her upset stomach resolved.  She restarted atorvastatin yesterday today has another upset stomach.    I have cued up the order for brand name only Lipitor.    Routed to provider.    Last seen for hyperlipidemia 4/18/17.      Recent Labs   Lab Test  04/18/17   1232  03/17/16   1144  01/26/15   0924  03/31/14   1621   CHOL  177  165  147  146   HDL  55  52  48*  43*   LDL  93  78  75  82   TRIG  144  173*  118  103   CHOLHDLRATIO   --    --   3.1  3.0         Yaritza Torres RN
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
## 2017-11-02 NOTE — NURSING NOTE
"Chief Complaint   Patient presents with     RECHECK     left foot pain - sprain - fell down the stairs - still bothersome       Initial Pulse 80  Resp 18  Ht 1.499 m (4' 11\")  Wt 80.7 kg (178 lb)  BMI 35.95 kg/m2 Estimated body mass index is 35.95 kg/(m^2) as calculated from the following:    Height as of this encounter: 1.499 m (4' 11\").    Weight as of this encounter: 80.7 kg (178 lb).  Medication Reconciliation: complete     Alicia Bullock CMA      "
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
## 2017-11-02 NOTE — MR AVS SNAPSHOT
After Visit Summary   11/2/2017    Farzana Hughes    MRN: [id number]           Patient Information     Date Of Birth          1955        Visit Information        Provider Department      11/2/2017 2:40 PM Jonh Calderon DPM [facility]        Today's Diagnoses     Sprain of left foot, initial encounter    -  1      Care Instructions    Contrast Soaking    1.  Fill one basin with warm water with Epson's salt and a second basin with ice water  2.  Soak the foot first in ice water for 5 minutes.  3   Place the foot in the warm water for 5 minutes.  4.  Repeat cold, hot, and finish with a cold soak.    Perform this morning and night.            Follow-ups after your visit        Follow-up notes from your care team     Return in about 4 weeks (around 11/30/2017).      Your next 10 appointments already scheduled     Nov 02, 2017  2:40 PM CDT   Return Visit with Jonh Calderon DPM   [facility] ([facility])    [address]  [place] [address]   [phone]            Apr 03, 2018  1:20 PM CDT   SHORT with DREW [facility] ([facility])    [address] [address]   [phone]              Who to contact     If you have questions or need follow up information about today's clinic visit or your schedule please contact [facility] directly at [phone].  Normal or non-critical lab and imaging results will be communicated to you by MyChart, letter or phone within 4 business days after the clinic has received the results. If you do not hear from us within 7 days, please contact the clinic through [employer] or phone. If you have a critical or abnormal lab result, we will notify you by phone as soon as possible.  Submit refill requests through [employer] or call your pharmacy and they will forward  "the refill request to us. Please allow 3 business days for your refill to be completed.          Additional Information About Your Visit        [employer] Information     [employer] gives you secure access to your electronic health record. If you see a primary care provider, you can also send messages to your care team and make appointments. If you have questions, please call your primary care clinic.  If you do not have a primary care provider, please call [phone] and they will assist you.        Care EveryWhere ID     This is your Care EveryWhere ID. This could be used by other organizations to access your [facility] medical records  [id number]        Your Vitals Were     Pulse Respirations Height BMI (Body Mass Index)          80 18 1.499 m (4' 11\") 35.95 kg/m2         Blood Pressure from Last 3 Encounters:   10/11/17 111/73   09/11/17 127/80   08/29/17 117/71    Weight from Last 3 Encounters:   11/02/17 80.7 kg (178 lb)   10/11/17 80.7 kg (178 lb)   09/11/17 80.6 kg (177 lb 11.2 oz)              Today, you had the following     No orders found for display       Primary Care Provider Office Phone # Fax #    Oly Winston, APRN [facility] [phone] [address] [address]        Equal Access to Services     MABLE ALBARADO : Hadii michelle ku hadasho Soyadyali, waaxda luqadaha, qaybta kaalmada adeegyada, ciarra bettencourt haysol weller . So [facility] [phone].    ATENCIÓN: Si habla español, tiene a coughlin disposición servicios gratuitos de asistencia lingüística. Llame al [phone].    We comply with applicable federal civil rights laws and [place] laws. We do not discriminate on the basis of race, color, national origin, age, disability, sex, sexual orientation, or gender identity.            Thank you!     Thank you for choosing [facility]  for your care. Our goal is always to provide you with excellent care. Hearing back from our patients is one way " we can continue to improve our services. Please take a few minutes to complete the written survey that you may receive in the mail after your visit with us. Thank you!             Your Updated Medication List - Protect others around you: Learn how to safely use, store and throw away your medicines at www.disposemymeds.org.          This list is accurate as of: 11/2/17  2:24 PM.  Always use your most recent med list.                   Brand Name Dispense Instructions for use Diagnosis    * ACCU-CHEK ELLA Kit     1 kit    1 Device daily.    Family history of diabetes mellitus       * blood glucose monitoring meter device kit    no brand specified    1 kit    Use to test blood sugars two times daily or as directed.    Type 2 diabetes, diet controlled (H)       * blood glucose monitoring meter device kit     1 kit    Use to test blood sugars two times daily or as directed.    Type 2 diabetes, diet controlled (H)       albuterol 108 (90 BASE) MCG/ACT Inhaler    PROAIR HFA    1 Inhaler    Inhale 2 puffs into the lungs every 4 hours as needed    Cough, Acute bronchospasm       ASPIRIN NOT PRESCRIBED    INTENTIONAL    0 each    Antiplatelet medication not prescribed intentionally due to Allergy        atorvastatin 40 MG tablet    LIPITOR    90 tablet    Take 1 tablet (40 mg) by mouth daily    Hyperlipidemia LDL goal <130       beclomethasone 40 MCG/ACT Inhaler    QVAR    1 Inhaler    Inhale 2 puffs into the lungs 2 times daily    Chronic cough       * blood glucose monitoring test strip    DARLENE CONTOUR    1 Box    Use to test blood sugars two times daily or as directed.    Type 2 diabetes, diet controlled (H)       * blood glucose monitoring test strip    no brand specified    1 Box    180 strips by In Vitro route 2 times daily    Type 2 diabetes, diet controlled (H)       cetirizine-psuedoePHEDrine 5-120 MG per 12 hr tablet    zyrTEC-D    90 tablet    Take 1 tablet by mouth 2 times daily    Moderate persistent asthma,  uncomplicated       doxepin 10 MG capsule    SINEquan    90 capsule    Take 1 capsule (10 mg) by mouth At Bedtime    Excoriation, Itching       fluticasone 50 MCG/ACT spray    FLONASE    16 g    Spray 2 sprays into both nostrils daily    Moderate persistent asthma, uncomplicated       GABAPENTIN PO      Take 100 mg by mouth        Garlic 1000 MG Caps      1 tablet twice daily        levalbuterol 1.25 MG/3ML neb solution    XOPENEX    270 mL    Take 3 mLs (1.25 mg) by nebulization every 4 hours as needed    Seasonal allergies       lisinopril 20 MG tablet    PRINIVIL/ZESTRIL    45 tablet    Take 0.5 tablets (10 mg) by mouth daily    Hypertension goal BP (blood pressure) < 130/80       LORazepam 1 MG tablet    ATIVAN    2 tablet    Take 1 tablet 30 minutes prior to MRI. Take another 1/2-1 tablet just prior to procedure if needed.  Do not operate a vehicle after taking this medication    Cervicalgia, Migraine without aura and without status migrainosus, not intractable, Occipital pain       montelukast 10 MG tablet    SINGULAIR    90 tablet    TAKE ONE TABLET BY MOUTH AT BEDTIME    Moderate persistent asthma, uncomplicated       omeprazole 40 MG capsule    priLOSEC    90 capsule    TAKE 1 CAPSULE (40 MG) BY MOUTH DAILY. TAKE 30 TO 60 MINUTES BEFORE A MEAL    Gastroesophageal reflux disease without esophagitis       * ONETOUCH LANCETS Misc     1 Box    90 Devices 2 times daily    Type 2 diabetes, diet controlled (H)       * blood glucose monitoring lancets     1 Box    Use to test blood sugar two times daily or as directed.    Type 2 diabetes, diet controlled (H)       order for DME     1 each    BP cuff, brand as covered by insurance.  Dx: HTN    Benign hypertension       * order for DME     1 Units    Crutches        * order for DME     1 Device    Trilok Ankle Brace    Sprain of right ankle, unspecified ligament, subsequent encounter       * order for DME     1 Units    Equipment being ordered: Hinged knee brace     Arthralgia of right lower leg, Knee injury, right, subsequent encounter       * order for DME     1 Device    CAM walker - short    Extensor tendinitis of foot, Right foot pain       * order for DME     1 Device    Hinged knee brace - medium    Knee injury, right, subsequent encounter, Patellofemoral stress syndrome of right knee, Chondromalacia of patella, right       rizatriptan 5 MG tablet    MAXALT          scopolamine 1.5 MG patch 72 hr    TRANSDERM-SCOP    4 patch    Place onto the skin every 72 hours    Travel sickness, sequela       topiramate 25 MG tablet    TOPAMAX    120 tablet    Take 2 tablets (50 mg) by mouth 2 times daily    Migraine without aura and without status migrainosus, not intractable       * Notice:  This list has 12 medication(s) that are the same as other medications prescribed for you. Read the directions carefully, and ask your doctor or other care provider to review them with you.

## 2017-11-02 NOTE — TELEPHONE ENCOUNTER
Reason for Call:  Other med request    Detailed comments: Patient states she needs the brand name Lipitor not the generic as she cannot take any pill with calcium in it and the generic contains calcium.    Phone Number Patient can be reached at: Cell number on file:    Telephone Information:   Mobile [phone]       Best Time: any    Can we leave a detailed message on this number? YES    Call taken on 11/2/2017 at 11:06 AM by Gina Woo

## 2017-11-03 ENCOUNTER — TELEPHONE (OUTPATIENT)
Dept: FAMILY MEDICINE | Facility: CLINIC | Age: 62
End: 2017-11-03

## 2017-11-04 NOTE — TELEPHONE ENCOUNTER
PA for Lipitor brand name completed at cover my med and faxed to [employer]/BS - will await response    BIN [id number]  [facility]

## 2017-11-08 NOTE — TELEPHONE ENCOUNTER
PA for Lipitor is not required as it is covered.  The patient attempted to fill too soon.  Pharmacy notified.

## 2017-11-27 ENCOUNTER — TELEPHONE (OUTPATIENT)
Dept: FAMILY MEDICINE | Facility: CLINIC | Age: 62
End: 2017-11-27

## 2017-11-27 NOTE — TELEPHONE ENCOUNTER
Patient's RX is for name brand lipitor which per pharmacy does contain calcium.  Per patient she has allergy to calcium.    Routing to provider.  Libby RAMIREZ RN

## 2017-11-27 NOTE — TELEPHONE ENCOUNTER
Please check into this further. A person cannot be allergic to calcium as this is an essential element in the body.  It could be Calcium Channel Blockers, or a dye in Calcium Carbonate antacids, or something along those lines.   She needs to work with Oly to find a replacement med when Oly is back. Melecio Chamorro

## 2017-11-27 NOTE — TELEPHONE ENCOUNTER
"Reason for call:  Patient reporting a symptom    Symptom or request: Pt states her pharmacy refilled her Lipitor with the generic brand and she thought that CLARA Winston wanted her to take the actual Lipitor?  She is also concerned that there is calcium in this med and she is \"allegic to calcium.\"      Duration (how long have symptoms been present): ongoing    Have you been treated for this before? Yes    Additional comments:     Phone Number patient can be reached at:  Home number on file [phone] (home)    Best Time:  any    Can we leave a detailed message on this number:  YES    Call taken on 11/27/2017 at 3:50 PM by Jocelyn Vicente    "

## 2017-11-28 NOTE — TELEPHONE ENCOUNTER
Pt notified and will call back when she can check her schedule. She has a lot going on right now.    [place]  [facility] [place]

## 2017-12-29 ENCOUNTER — TELEPHONE (OUTPATIENT)
Dept: FAMILY MEDICINE | Facility: CLINIC | Age: 62
End: 2017-12-29

## 2017-12-29 DIAGNOSIS — J45.40 MODERATE PERSISTENT ASTHMA, UNCOMPLICATED: ICD-10-CM

## 2017-12-29 DIAGNOSIS — E78.5 HYPERLIPIDEMIA LDL GOAL <130: ICD-10-CM

## 2017-12-29 RX ORDER — ATORVASTATIN CALCIUM 40 MG/1
40 TABLET, FILM COATED ORAL DAILY
Qty: 90 TABLET | Refills: 1 | Status: SHIPPED | OUTPATIENT
Start: 2017-12-29 | End: 2018-03-29

## 2017-12-29 RX ORDER — CETIRIZINE HCL, PSEUDOEPHEDRINE HCL 5; 120 MG/1; MG/1
TABLET, EXTENDED RELEASE ORAL
Qty: 48 TABLET | Refills: 1 | Status: SHIPPED | OUTPATIENT
Start: 2017-12-29 | End: 2018-02-16

## 2017-12-29 NOTE — TELEPHONE ENCOUNTER
Lipitor (generic)     Last Written Prescription Date: 11/2/2017  Last Fill Quantity: 90, # refills: 1  Last Office Visit with [facility] or [facility] prescribing provider: 8/29/2017  Patient is calling for the GENERIC of Lipitor, the copay is to expensive.       Potassium   Date Value Ref Range Status   04/18/2017 4.2 3.4 - 5.3 mmol/L Final     Creatinine   Date Value Ref Range Status   04/18/2017 0.87 0.52 - 1.04 mg/dL Final     BP Readings from Last 3 Encounters:   10/11/17 111/73   09/11/17 127/80   08/29/17 117/71     Zyrtec    Last Written Prescription Date: 8/29/2017  Last Fill Quantity: 90,  # refills: 5   Last Office Visit with [facility], [facility] or [facility] prescribing provider: 8/29/2017    Patient is also wondering when she should be seen and when she should do labs again. Please call.  Renee Cardona  Clinic [facility]  [place]

## 2017-12-29 NOTE — TELEPHONE ENCOUNTER
The pt is requesting to change the Lipitor to generic. She states that it is too expensive. I have reviewed the notes that state she gets GI upset from the generic. She states that she 'doesn't care, this medication is too expensive'   Please review and advise on this request.   Thank you,  Marilyn Butt RN

## 2017-12-29 NOTE — TELEPHONE ENCOUNTER
EQL All Day Allergy-D Oral Tablet Extended Release 12 Hour 5-120 MG      Last Written Prescription Date:  08/29/2017  Last Fill Quantity: 90,   # refills: 5  Last Office Visit: 10/11/2017  Future Office visit:       Routing refill request to provider for review/approval because:  Drug not on the [facility], [facility] or [facility] refill protocol or controlled substance

## 2017-12-29 NOTE — TELEPHONE ENCOUNTER
Rx signed and faxed to  [facility] [phone]. Patient notified.  Renee Cardona  [facility] [place] [facility]

## 2018-02-08 ENCOUNTER — RADIANT APPOINTMENT (OUTPATIENT)
Dept: GENERAL RADIOLOGY | Facility: CLINIC | Age: 63
End: 2018-02-08
Attending: PODIATRIST
Payer: COMMERCIAL

## 2018-02-08 ENCOUNTER — OFFICE VISIT (OUTPATIENT)
Dept: PODIATRY | Facility: CLINIC | Age: 63
End: 2018-02-08
Payer: COMMERCIAL

## 2018-02-08 VITALS — HEART RATE: 80 BPM | BODY MASS INDEX: 36.29 KG/M2 | HEIGHT: 59 IN | WEIGHT: 180 LBS

## 2018-02-08 DIAGNOSIS — M72.2 PLANTAR FASCIITIS, RIGHT: ICD-10-CM

## 2018-02-08 DIAGNOSIS — M77.31 HEEL SPUR, RIGHT: ICD-10-CM

## 2018-02-08 DIAGNOSIS — M79.671 PAIN OF RIGHT HEEL: ICD-10-CM

## 2018-02-08 DIAGNOSIS — M79.671 PAIN OF RIGHT HEEL: Primary | ICD-10-CM

## 2018-02-08 PROCEDURE — 73630 X-RAY EXAM OF FOOT: CPT | Mod: RT

## 2018-02-08 PROCEDURE — 99213 OFFICE O/P EST LOW 20 MIN: CPT | Performed by: PODIATRIST
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
## 2018-02-08 NOTE — NURSING NOTE
"Chief Complaint   Patient presents with     Consult     Right foot heel pain       Initial Pulse 80  Ht 4' 11\" (1.499 m)  Wt 180 lb (81.6 kg)  BMI 36.36 kg/m2 Estimated body mass index is 36.36 kg/(m^2) as calculated from the following:    Height as of this encounter: 4' 11\" (1.499 m).    Weight as of this encounter: 180 lb (81.6 kg).  Medication Reconciliation: complete     Mariajose Mejia MA        "

## 2018-02-08 NOTE — MR AVS SNAPSHOT
After Visit Summary   2/8/2018    Farzana Hughes    MRN: [id number]           Patient Information     Date Of Birth          1955        Visit Information        Provider Department      2/8/2018 1:20 PM Jonh Calderon DPM [facility]        Today's Diagnoses     Pain of right heel    -  1    Plantar fasciitis, right        Heel spur, right          Care Instructions    Initial musculoskeletal treatment recommendation:    1.  Stretch the calf muscles as instructed once an hour.  2.  Ice the injured area in the evening; 20 min on/off.  3.  Take antiinflammatory medication as directed.  4.  Massage the soft tissues around the injured area in the morning to loosen the tissue  5.  Wear supportive foot wear and/or arch supports (rigid not cushion).      If no improvement in symptoms within four to six weeks, return to clinic for reevaluation.            Follow-ups after your visit        Follow-up notes from your care team     Return in about 4 weeks (around 3/8/2018), or if symptoms worsen or fail to improve.      Your next 10 appointments already scheduled     Apr 03, 2018  1:20 PM CDT   SHORT with DREW [facility] ([facility])    [address] [id number] [phone]              Who to contact     If you have questions or need follow up information about today's clinic visit or your schedule please contact [facility] directly at [phone].  Normal or non-critical lab and imaging results will be communicated to you by MyChart, letter or phone within 4 business days after the clinic has received the results. If you do not hear from us within 7 days, please contact the clinic through MyChart or phone. If you have a critical or abnormal lab result, we will notify you by phone as soon as possible.  Submit refill requests through [employer] or call your  "pharmacy and they will forward the refill request to us. Please allow 3 business days for your refill to be completed.          Additional Information About Your Visit        MyChart Information     [employer] gives you secure access to your electronic health record. If you see a primary care provider, you can also send messages to your care team and make appointments. If you have questions, please call your primary care clinic.  If you do not have a primary care provider, please call [phone] and they will assist you.        Care EveryWhere ID     This is your Care EveryWhere ID. This could be used by other organizations to access your [facility] medical records  [id number]        Your Vitals Were     Pulse Height BMI (Body Mass Index)             80 4' 11\" (1.499 m) 36.36 kg/m2          Blood Pressure from Last 3 Encounters:   10/11/17 111/73   09/11/17 127/80   08/29/17 117/71    Weight from Last 3 Encounters:   02/08/18 180 lb (81.6 kg)   11/02/17 178 lb (80.7 kg)   10/11/17 178 lb (80.7 kg)               Primary Care Provider Office Phone # Fax #    Oly Winston, DREW [facility] [phone] [address] [address]        Equal Access to Services     MABLE ALBARADO AH: Hadii michelle ku hadasho Soomaali, waaxda luqadaha, qaybta kaalmada adeegyada, waxay idiin hayerican alesia espinoza. So [facility] [phone].    ATENCIÓN: Si habla español, tiene a coughlin disposición servicios gratuitos de asistencia lingüística. Llame al [phone].    We comply with applicable federal civil rights laws and [place] laws. We do not discriminate on the basis of race, color, national origin, age, disability, sex, sexual orientation, or gender identity.            Thank you!     Thank you for choosing [facility]  for your care. Our goal is always to provide you with excellent care. Hearing back from our patients is one way we can continue to improve our services. Please take a " few minutes to complete the written survey that you may receive in the mail after your visit with us. Thank you!             Your Updated Medication List - Protect others around you: Learn how to safely use, store and throw away your medicines at www.disposemymeds.org.          This list is accurate as of 2/8/18  1:31 PM.  Always use your most recent med list.                   Brand Name Dispense Instructions for use Diagnosis    * ACCU-CHEK ELLA Kit     1 kit    1 Device daily.    Family history of diabetes mellitus       * blood glucose monitoring meter device kit    no brand specified    1 kit    Use to test blood sugars two times daily or as directed.    Type 2 diabetes, diet controlled (H)       * blood glucose monitoring meter device kit     1 kit    Use to test blood sugars two times daily or as directed.    Type 2 diabetes, diet controlled (H)       albuterol 108 (90 BASE) MCG/ACT Inhaler    PROAIR HFA    1 Inhaler    Inhale 2 puffs into the lungs every 4 hours as needed    Cough, Acute bronchospasm       ASPIRIN NOT PRESCRIBED    INTENTIONAL    0 each    Antiplatelet medication not prescribed intentionally due to Allergy        atorvastatin 40 MG tablet    LIPITOR    90 tablet    Take 1 tablet (40 mg) by mouth daily Generic please    Hyperlipidemia LDL goal <130       beclomethasone 40 MCG/ACT Inhaler    QVAR    1 Inhaler    Inhale 2 puffs into the lungs 2 times daily    Chronic cough       * blood glucose monitoring test strip    DARLENE CONTOUR    1 Box    Use to test blood sugars two times daily or as directed.    Type 2 diabetes, diet controlled (H)       * blood glucose monitoring test strip    no brand specified    1 Box    180 strips by In Vitro route 2 times daily    Type 2 diabetes, diet controlled (H)       doxepin 10 MG capsule    SINEquan    90 capsule    Take 1 capsule (10 mg) by mouth At Bedtime    Excoriation, Itching       EQL ALL DAY ALLERGY-D 5-120 MG per 12 hr tablet   Generic drug:   cetirizine-psuedoePHEDrine     48 tablet    TAKE 1 TABLET BY MOUTH 2 TIMES DAILY    Moderate persistent asthma, uncomplicated       fluticasone 50 MCG/ACT spray    FLONASE    16 g    Spray 2 sprays into both nostrils daily    Moderate persistent asthma, uncomplicated       GABAPENTIN PO      Take 100 mg by mouth        Garlic 1000 MG Caps      1 tablet twice daily        levalbuterol 1.25 MG/3ML neb solution    XOPENEX    270 mL    Take 3 mLs (1.25 mg) by nebulization every 4 hours as needed    Seasonal allergies       lisinopril 20 MG tablet    PRINIVIL/ZESTRIL    45 tablet    Take 0.5 tablets (10 mg) by mouth daily    Hypertension goal BP (blood pressure) < 130/80       LORazepam 1 MG tablet    ATIVAN    2 tablet    Take 1 tablet 30 minutes prior to MRI. Take another 1/2-1 tablet just prior to procedure if needed.  Do not operate a vehicle after taking this medication    Cervicalgia, Migraine without aura and without status migrainosus, not intractable, Occipital pain       montelukast 10 MG tablet    SINGULAIR    90 tablet    TAKE ONE TABLET BY MOUTH AT BEDTIME    Moderate persistent asthma, uncomplicated       omeprazole 40 MG capsule    priLOSEC    90 capsule    TAKE 1 CAPSULE (40 MG) BY MOUTH DAILY. TAKE 30 TO 60 MINUTES BEFORE A MEAL    Gastroesophageal reflux disease without esophagitis       * ONETOUCH LANCETS Misc     1 Box    90 Devices 2 times daily    Type 2 diabetes, diet controlled (H)       * blood glucose monitoring lancets     1 Box    Use to test blood sugar two times daily or as directed.    Type 2 diabetes, diet controlled (H)       order for DME     1 each    BP cuff, brand as covered by insurance.  Dx: HTN    Benign hypertension       * order for DME     1 Units    Crutches        * order for DME     1 Device    Trilok Ankle Brace    Sprain of right ankle, unspecified ligament, subsequent encounter       * order for DME     1 Units    Equipment being ordered: Hinged knee brace    Arthralgia of  right lower leg, Knee injury, right, subsequent encounter       * order for DME     1 Device    CAM walker - short    Extensor tendinitis of foot, Right foot pain       * order for DME     1 Device    Hinged knee brace - medium    Knee injury, right, subsequent encounter, Patellofemoral stress syndrome of right knee, Chondromalacia of patella, right       rizatriptan 5 MG tablet    MAXALT          scopolamine 1.5 MG patch 72 hr    TRANSDERM-SCOP    4 patch    Place onto the skin every 72 hours    Travel sickness, sequela       topiramate 25 MG tablet    TOPAMAX    120 tablet    Take 2 tablets (50 mg) by mouth 2 times daily    Migraine without aura and without status migrainosus, not intractable       * Notice:  This list has 12 medication(s) that are the same as other medications prescribed for you. Read the directions carefully, and ask your doctor or other care provider to review them with you.
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
## 2018-02-13 ENCOUNTER — TRANSFERRED RECORDS (OUTPATIENT)
Dept: HEALTH INFORMATION MANAGEMENT | Facility: CLINIC | Age: 63
End: 2018-02-13

## 2018-02-16 DIAGNOSIS — J45.40 MODERATE PERSISTENT ASTHMA, UNCOMPLICATED: ICD-10-CM

## 2018-02-19 NOTE — TELEPHONE ENCOUNTER
EQL All Day Allergy-D Oral Tablet Extended Release 12 Hour 5-120 MG      Last Written Prescription Date:  12/29/2017  Last Fill Quantity: 48,   # refills: 1  Last Office Visit: 10/11/2017 [place]  Future Office visit:    Next 5 appointments (look out 90 days)     Apr 03, 2018  1:20 PM CDT   SHORT with Oly Winston, APRN CNP   [facility]    [address] [address]   [phone]                   Routing refill request to provider for review/approval because:  Drug not on the [facility], [facility] or  Health refill protocol or controlled substance      Elier HINSON (R)

## 2018-02-21 RX ORDER — CETIRIZINE HCL, PSEUDOEPHEDRINE HCL 5; 120 MG/1; MG/1
TABLET, EXTENDED RELEASE ORAL
Qty: 48 TABLET | Refills: 0 | Status: SHIPPED | OUTPATIENT
Start: 2018-02-21 | End: 2018-03-21

## 2018-03-09 ENCOUNTER — NURSE TRIAGE (OUTPATIENT)
Dept: NURSING | Facility: CLINIC | Age: 63
End: 2018-03-09

## 2018-03-10 NOTE — TELEPHONE ENCOUNTER
Patient calling to report sinus pain and congestion for past week along with frequent cough.  Reason for Disposition    Lots of coughing    Additional Information    Negative: Severe difficulty breathing (e.g., struggling for each breath, speaks in single words)    Negative: Sounds like a life-threatening emergency to the triager    Negative: [1] Sinus infection AND [2] taking an antibiotic AND [3] symptoms continue    Negative: [1] Difficulty breathing AND [2] not from stuffy nose (e.g., not relieved by cleaning out the nose)    Negative: [1] SEVERE headache AND [2] fever    Negative: [1] Redness or swelling on the cheek, forehead or around the eye AND [2] fever    Negative: Fever > 104 F (40 C)    Negative: Patient sounds very sick or weak to the triager    Negative: [1] SEVERE pain AND [2] not improved 2 hours after pain medicine    Negative: [1] Redness or swelling on the cheek, forehead or around the eye AND [2] no fever    Negative: [1] Fever > 101 F (38.3 C) AND [2] age > 60    Negative: [1] Fever > 101 F (38.3 C) AND [2] bedridden (e.g., nursing home patient, CVA, chronic illness, recovering from surgery)    Negative: [1] Fever > 100.5 F (38.1 C) AND [2] diabetes mellitus or weak immune system (e.g., HIV positive, cancer chemo, splenectomy, organ transplant, chronic steroids)    Negative: Fever present > 3 days (72 hours)    Negative: [1] Fever returns after gone for over 24 hours AND [2] symptoms worse or not improved    Negative: [1] Sinus pain (not just congestion) AND [2] fever    Negative: Earache    Negative: [1] Sinus congestion (pressure, fullness) AND [2] present > 10 days    Negative: [1] Nasal discharge AND [2] present > 10 days    Negative: [1] Using nasal washes and pain medicine > 24 hours AND [2] sinus pain (around cheekbone or eye) persists    Protocols used: SINUS PAIN OR CONGESTION-ADULT-

## 2018-03-12 ENCOUNTER — OFFICE VISIT (OUTPATIENT)
Dept: FAMILY MEDICINE | Facility: CLINIC | Age: 63
End: 2018-03-12
Payer: COMMERCIAL

## 2018-03-12 VITALS
TEMPERATURE: 98 F | BODY MASS INDEX: 36.76 KG/M2 | SYSTOLIC BLOOD PRESSURE: 118 MMHG | WEIGHT: 182 LBS | HEART RATE: 94 BPM | DIASTOLIC BLOOD PRESSURE: 82 MMHG | RESPIRATION RATE: 16 BRPM | OXYGEN SATURATION: 97 %

## 2018-03-12 DIAGNOSIS — J20.8 ACUTE BRONCHITIS DUE TO OTHER SPECIFIED ORGANISMS: Primary | ICD-10-CM

## 2018-03-12 DIAGNOSIS — J45.41 MODERATE PERSISTENT ASTHMA WITH EXACERBATION: ICD-10-CM

## 2018-03-12 PROCEDURE — 99213 OFFICE O/P EST LOW 20 MIN: CPT | Performed by: FAMILY MEDICINE

## 2018-03-12 RX ORDER — AZELASTINE 1 MG/ML
1-2 SPRAY, METERED NASAL 2 TIMES DAILY
Qty: 1 BOTTLE | Refills: 1 | Status: SHIPPED | OUTPATIENT
Start: 2018-03-12 | End: 2018-04-30

## 2018-03-12 RX ORDER — PREDNISONE 20 MG/1
40 TABLET ORAL DAILY
Qty: 10 TABLET | Refills: 0 | Status: SHIPPED | OUTPATIENT
Start: 2018-03-12 | End: 2018-03-17

## 2018-03-12 RX ORDER — AZITHROMYCIN 250 MG/1
TABLET, FILM COATED ORAL
Qty: 6 TABLET | Refills: 0 | Status: SHIPPED | OUTPATIENT
Start: 2018-03-12 | End: 2018-04-26

## 2018-03-12 NOTE — PROGRESS NOTES
SUBJECTIVE:   Farzana Hughes is a 62 year old female who presents to clinic today for the following health issues:      ENT Symptoms             Symptoms: cc Present Absent Comment   Fever/Chills   x    Fatigue  x     Muscle Aches   x    Eye Irritation   x    Sneezing  x     Nasal Floyd/Drg  x     Sinus Pressure/Pain  x     Loss of smell  x     Dental pain   x    Sore Throat   x    Swollen Glands   x    Ear Pain/Fullness  x     Cough  x     Wheeze  x  asthma   Chest Pain  x  coughing   Shortness of breath  x  coughing   Rash   x    Other         Symptom duration:  2 weeks   Symptom severity:  severe   Treatments tried:  dayquil, robitussin DM   Contacts:  Family     Stepdaughter has a cold  Patient has history of asthma  Tactile fever  Productive cough  Shortness of breath using inhaler 2-3x per day, neb every night before going to bed  No muscle aches  No ear pain  Some sinus pressure  Rhinitis    Problem list and histories reviewed & adjusted, as indicated.  Additional history: as documented    BP Readings from Last 3 Encounters:   03/12/18 118/82   10/11/17 111/73   09/11/17 127/80    Wt Readings from Last 3 Encounters:   03/12/18 182 lb (82.6 kg)   02/08/18 180 lb (81.6 kg)   11/02/17 178 lb (80.7 kg)        Reviewed and updated as needed this visit by clinical staff  Tobacco  Allergies  Meds  Problems       Reviewed and updated as needed this visit by Provider  Allergies  Meds  Problems         ROS:  Constitutional, HEENT, cardiovascular, pulmonary, gi and gu systems are negative, except as otherwise noted.    OBJECTIVE:     /82  Pulse 94  Temp 98  F (36.7  C) (Oral)  Resp 16  Wt 182 lb (82.6 kg)  SpO2 97%  BMI 36.76 kg/m2  Body mass index is 36.76 kg/(m^2).  GENERAL: healthy, alert and no distress  EYES: Eyes grossly normal to inspection, PERRL and conjunctivae and sclerae normal  HENT: ear canals and TM's normal, nose and mouth without ulcers or lesions  NECK: no adenopathy, no asymmetry,  masses, or scars and thyroid normal to palpation  RESP: lungs clear to auscultation - no rales, rhonchi or wheezes  CV: regular rate and rhythm, normal S1 S2, no S3 or S4, no murmur, click or rub, no peripheral edema and peripheral pulses strong  MS: no gross musculoskeletal defects noted, no edema  SKIN: no suspicious lesions or rashes  NEURO: Normal strength and tone, mentation intact and speech normal  PSYCH: mentation appears normal, affect normal/bright    ASSESSMENT/PLAN:     1. Acute bronchitis due to other specified organisms    - predniSONE (DELTASONE) 20 MG tablet; Take 2 tablets (40 mg) by mouth daily for 5 days  Dispense: 10 tablet; Refill: 0  - azithromycin (ZITHROMAX) 250 MG tablet; Two tablets first day, then one tablet daily for four days.  Dispense: 6 tablet; Refill: 0  - azelastine (ASTELIN) 0.1 % spray; Spray 1-2 sprays into both nostrils 2 times daily  Dispense: 1 Bottle; Refill: 1    2. Moderate persistent asthma with exacerbation    - predniSONE (DELTASONE) 20 MG tablet; Take 2 tablets (40 mg) by mouth daily for 5 days  Dispense: 10 tablet; Refill: 0  - azithromycin (ZITHROMAX) 250 MG tablet; Two tablets first day, then one tablet daily for four days.  Dispense: 6 tablet; Refill: 0    Follow up if symptoms worsen or fail to improve.     Villa Blake MD  [facility]

## 2018-03-12 NOTE — MR AVS SNAPSHOT
After Visit Summary   3/12/2018    Farzana Hughes    MRN: [id number]           Patient Information     Date Of Birth          1955        Visit Information        Provider Department      3/12/2018 12:00 PM Villa Barakat MD [facility]        Today's Diagnoses     Acute bronchitis due to other specified organisms    -  1    Moderate persistent asthma with exacerbation          Care Instructions      Bronchitis with Wheezing (Viral or Bacterial: Adult)    Bronchitis is an infection of the air passages. It often occurs during a cold and is usually caused by a virus. Symptoms include cough with mucus (phlegm) and low-grade fever. This illness is contagious during the first few days and is spread through the air by coughing and sneezing, or by direct contact (touching the sick person and then touching your own eyes, nose, or mouth).  If there is a lot of inflammation, air flow is restricted. The air passages may also go into spasm, especially if you have asthma. This causes wheezing and difficulty breathing even in people who do not have asthma.  Bronchitis usually lasts 7 to 14 days. The wheezing should improve with treatment during the first week. An inhaler is often prescribed to relax the air passages and stop wheezing. Antibiotics will be prescribed if your doctor thinks there is also a secondary bacterial infection.  Home care    If symptoms are severe, rest at home for the first 2 to 3 days. When you go back to your usual activities, don't let yourself get too tired.    Do not smoke. Also avoid being exposed to secondhand smoke.    You may use over-the-counter medicine to control fever or pain, unless another medicine was prescribed. Note: If you have chronic liver or kidney disease or have ever had a stomach ulcer or gastrointestinal bleeding, talk with your healthcare provider before using these medicines. Also talk to your provider if you are taking medicine  to prevent blood clots.) Aspirin should never be given to anyone younger than 18 years of age who is ill with a viral infection or fever. It may cause severe liver or brain damage.    Your appetite may be poor, so a light diet is fine. Avoid dehydration by drinking 6 to 8 glasses of fluids per day (such as water, soft drinks, sports drinks, juices, tea, or soup). Extra fluids will help loosen secretions in the nose and lungs.    Over-the-counter cough, cold, and sore-throat medicines will not shorten the length of the illness, but they may be helpful to reduce symptoms. (Note: Do not use decongestants if you have high blood pressure.)    If you were given an inhaler, use it exactly as directed. If you need to use it more often than prescribed, your condition may be worsening. If this happens, contact your healthcare provider.    If prescribed, finish all antibiotic medicine, even if you are feeling better after only a few days.  Follow-up care  Follow up with your healthcare provider, or as advised. If you had an X-ray or ECG (electrocardiogram), a specialist will review it. You will be notified of any new findings that may affect your care.  Note: If you are age 65 or older, or if you have a chronic lung disease or condition that affects your immune system, or you smoke, talk to your healthcare provider about having a pneumococcal vaccinations and a yearly influenza vaccination (flu shot).  When to seek medical advice  Call your healthcare provider right away if any of these occur:    Fever of 100.4 F (38 C) or higher    Coughing up increasing amounts of colored sputum    Weakness, drowsiness, headache, facial pain, ear pain, or a stiff neck  Call 911, or get immediate medical care  Contact emergency services right away if any of these occur.    Coughing up blood    Worsening weakness, drowsiness, headache, or stiff neck    Increased wheezing not helped with medication, shortness of breath, or pain with  breathing  Date Last Reviewed: 9/13/2015 2000-2017 [employer]. [address] [place] [address], [place] [address]. All rights reserved. This information is not intended as a substitute for professional medical care. Always follow your healthcare professional's instructions.      [facility]    If you have any questions regarding to your visit please contact your care team:       Team Purple:   Clinic Hours Telephone Number   Dr. Alaina Blake   7am-7pm  Monday - Thursday   7am-5pm  Fridays  [phone]  (Appointment scheduling available 24/7)    Questions about your Visit?   Team Line:  [phone]   Urgent Care - [place] and [facility] - 11am-9pm Monday-Friday Saturday-Sunday- 9am-5pm   [place] - 5pm-9pm Monday-Friday Saturday-Sunday- 9am-5pm  [phone] - [facility]  [phone] - [place]       What options do I have for visits at the clinic other than the traditional office visit?  To expand how we care for you, many of our providers are utilizing electronic visits (e-visits) and telephone visits, when medically appropriate, for interactions with their patients rather than a visit in the clinic.   We also offer nurse visits for many medical concerns. Just like any other service, we will bill your insurance company for this type of visit based on time spent on the phone with your provider. Not all insurance companies cover these visits. Please check with your medical insurance if this type of visit is covered. You will be responsible for any charges that are not paid by your insurance.      E-visits via [employer]:  generally incur a $35.00 fee.  Telephone visits:  Time spent on the phone: *charged based on time that is spent on the phone in increments of 10 minutes. Estimated cost:   5-10 mins $30.00   11-20 mins. $59.00   21-30 mins. $85.00     Use [employer] (secure email communication and access to your  chart) to send your primary care provider a message or make an appointment. Ask someone on your Team how to sign up for [employer].  For a Price Quote for your services, please call our Consumer Price Line at [phone].  As always, Thank you for trusting us with your health care needs!    Manuela Domingo MA            Follow-ups after your visit        Your next 10 appointments already scheduled     Apr 03, 2018 12:40 PM CDT   [facility]    [address] [address]   [phone]            Apr 03, 2018  2:00 PM CDT   [facility] Petra [facility]ee [facility]-C   [facility]    [address] [address] [phone]              Who to contact     If you have questions or need follow up information about today's clinic visit or your schedule please contact [facility] directly at [phone].  Normal or non-critical lab and imaging results will be communicated to you by [employer], letter or phone within 4 business days after the clinic has received the results. If you do not hear from us within 7 days, please contact the clinic through [employer] or phone. If you have a critical or abnormal lab result, we will notify you by phone as soon as possible.  Submit refill requests through [employer] or call your pharmacy and they will forward the refill request to us. Please allow 3 business days for your refill to be completed.          Additional Information About Your Visit        [employer] Information     [employer] gives you secure access to your electronic health record. If you see a primary care provider, you can also send messages to your care team and make appointments. If you have questions, please call your primary care clinic.  If you do not have a primary care provider, please call [phone] and they will assist you.        Care EveryWhere ID      This is your Care EveryWhere ID. This could be used by other organizations to access your [place] medical records  [id number]        Your Vitals Were     Pulse Temperature Respirations Pulse Oximetry BMI (Body Mass Index)       94 98  F (36.7  C) (Oral) 16 97% 36.76 kg/m2        Blood Pressure from Last 3 Encounters:   03/12/18 118/82   10/11/17 111/73   09/11/17 127/80    Weight from Last 3 Encounters:   03/12/18 182 lb (82.6 kg)   02/08/18 180 lb (81.6 kg)   11/02/17 178 lb (80.7 kg)              Today, you had the following     No orders found for display         Today's Medication Changes          These changes are accurate as of 3/12/18 12:57 PM.  If you have any questions, ask your nurse or doctor.               Start taking these medicines.        Dose/Directions    azelastine 0.1 % spray   Commonly known as:  ASTELIN   Used for:  Acute bronchitis due to other specified organisms   Started by:  Villa Barakat MD        Dose:  1-2 spray   Spray 1-2 sprays into both nostrils 2 times daily   Quantity:  1 Bottle   Refills:  1       azithromycin 250 MG tablet   Commonly known as:  ZITHROMAX   Used for:  Acute bronchitis due to other specified organisms, Moderate persistent asthma with exacerbation   Started by:  Villa Barakat MD        Two tablets first day, then one tablet daily for four days.   Quantity:  6 tablet   Refills:  0       predniSONE 20 MG tablet   Commonly known as:  DELTASONE   Used for:  Acute bronchitis due to other specified organisms, Moderate persistent asthma with exacerbation   Started by:  Villa Barakat MD        Dose:  40 mg   Take 2 tablets (40 mg) by mouth daily for 5 days   Quantity:  10 tablet   Refills:  0            Where to get your medicines      These medications were sent to [facility] [address] [place] [address], [place] - [address]  [address], [facility] [address]     Phone:  [phone]      azelastine 0.1 % spray    azithromycin 250 MG tablet    predniSONE 20 MG tablet                Primary Care Provider Office Phone # Fax #    DREW Gardner [facility] [phone] [address] [address]        Equal Access to Services     MABLE ALBARADO : Hadii aad ku hadasho Soomaali, waaxda luqadaha, qaybta kaalmada adeegyada, waxay idiin hayaan adeuriah khla laallan espinoza. So [facility] [phone].    ATENCIÓN: Si habla español, tiene a coughlin disposición servicios gratuitos de asistencia lingüística. Alhajiame al [phone].    We comply with applicable federal civil rights laws and [place] laws. We do not discriminate on the basis of race, color, national origin, age, disability, sex, sexual orientation, or gender identity.            Thank you!     Thank you for choosing [facility]  for your care. Our goal is always to provide you with excellent care. Hearing back from our patients is one way we can continue to improve our services. Please take a few minutes to complete the written survey that you may receive in the mail after your visit with us. Thank you!             Your Updated Medication List - Protect others around you: Learn how to safely use, store and throw away your medicines at www.disposemymeds.org.          This list is accurate as of 3/12/18 12:57 PM.  Always use your most recent med list.                   Brand Name Dispense Instructions for use Diagnosis    * ACCU-CHEK ELLA Kit     1 kit    1 Device daily.    Family history of diabetes mellitus       * blood glucose monitoring meter device kit    no brand specified    1 kit    Use to test blood sugars two times daily or as directed.    Type 2 diabetes, diet controlled (H)       * blood glucose monitoring meter device kit     1 kit    Use to test blood sugars two times daily or as directed.    Type 2 diabetes, diet controlled (H)       albuterol 108 (90 BASE) MCG/ACT Inhaler    PROAIR HFA    1 Inhaler    Inhale  2 puffs into the lungs every 4 hours as needed    Cough, Acute bronchospasm       ASPIRIN NOT PRESCRIBED    INTENTIONAL    0 each    Antiplatelet medication not prescribed intentionally due to Allergy        atorvastatin 40 MG tablet    LIPITOR    90 tablet    Take 1 tablet (40 mg) by mouth daily Generic please    Hyperlipidemia LDL goal <130       azelastine 0.1 % spray    ASTELIN    1 Bottle    Spray 1-2 sprays into both nostrils 2 times daily    Acute bronchitis due to other specified organisms       azithromycin 250 MG tablet    ZITHROMAX    6 tablet    Two tablets first day, then one tablet daily for four days.    Acute bronchitis due to other specified organisms, Moderate persistent asthma with exacerbation       beclomethasone 40 MCG/ACT Inhaler    QVAR    1 Inhaler    Inhale 2 puffs into the lungs 2 times daily    Chronic cough       * blood glucose monitoring test strip    [employer] CONTOUR    1 Box    Use to test blood sugars two times daily or as directed.    Type 2 diabetes, diet controlled (H)       * blood glucose monitoring test strip    no brand specified    1 Box    180 strips by In Vitro route 2 times daily    Type 2 diabetes, diet controlled (H)       doxepin 10 MG capsule    SINEquan    90 capsule    Take 1 capsule (10 mg) by mouth At Bedtime    Excoriation, Itching       EQL ALL DAY ALLERGY-D 5-120 MG per 12 hr tablet   Generic drug:  cetirizine-psuedoePHEDrine     48 tablet    TAKE ONE TABLET BY MOUTH TWICE DAILY    Moderate persistent asthma, uncomplicated       fluticasone 50 MCG/ACT spray    FLONASE    16 g    Spray 2 sprays into both nostrils daily    Moderate persistent asthma, uncomplicated       GABAPENTIN PO      Take 100 mg by mouth        Garlic 1000 MG Caps      1 tablet twice daily        levalbuterol 1.25 MG/3ML neb solution    XOPENEX    270 mL    Take 3 mLs (1.25 mg) by nebulization every 4 hours as needed    Seasonal allergies       lisinopril 20 MG tablet    PRINIVIL/ZESTRIL    45  tablet    Take 0.5 tablets (10 mg) by mouth daily    Hypertension goal BP (blood pressure) < 130/80       LORazepam 1 MG tablet    ATIVAN    2 tablet    Take 1 tablet 30 minutes prior to MRI. Take another 1/2-1 tablet just prior to procedure if needed.  Do not operate a vehicle after taking this medication    Cervicalgia, Migraine without aura and without status migrainosus, not intractable, Occipital pain       montelukast 10 MG tablet    SINGULAIR    90 tablet    TAKE ONE TABLET BY MOUTH AT BEDTIME    Moderate persistent asthma, uncomplicated       omeprazole 40 MG capsule    priLOSEC    90 capsule    TAKE 1 CAPSULE (40 MG) BY MOUTH DAILY. TAKE 30 TO 60 MINUTES BEFORE A MEAL    Gastroesophageal reflux disease without esophagitis       * ONETOUCH LANCETS Misc     1 Box    90 Devices 2 times daily    Type 2 diabetes, diet controlled (H)       * blood glucose monitoring lancets     1 Box    Use to test blood sugar two times daily or as directed.    Type 2 diabetes, diet controlled (H)       order for DME     1 each    BP cuff, brand as covered by insurance.  Dx: HTN    Benign hypertension       * order for DME     1 Units    Crutches        * order for DME     1 Device    Trilok Ankle Brace    Sprain of right ankle, unspecified ligament, subsequent encounter       * order for DME     1 Units    Equipment being ordered: Hinged knee brace    Arthralgia of right lower leg, Knee injury, right, subsequent encounter       * order for DME     1 Device    CAM walker - short    Extensor tendinitis of foot, Right foot pain       * order for DME     1 Device    Hinged knee brace - medium    Knee injury, right, subsequent encounter, Patellofemoral stress syndrome of right knee, Chondromalacia of patella, right       * order for DME     1 Units    Equipment being ordered: Silicone heel cup    Pain of right heel, Plantar fasciitis, right, Heel spur, right       predniSONE 20 MG tablet    DELTASONE    10 tablet    Take 2 tablets  (40 mg) by mouth daily for 5 days    Acute bronchitis due to other specified organisms, Moderate persistent asthma with exacerbation       rizatriptan 5 MG tablet    MAXALT          scopolamine 1.5 MG patch 72 hr    TRANSDERM-SCOP    4 patch    Place onto the skin every 72 hours    Travel sickness, sequela       topiramate 25 MG tablet    TOPAMAX    120 tablet    Take 2 tablets (50 mg) by mouth 2 times daily    Migraine without aura and without status migrainosus, not intractable       * Notice:  This list has 13 medication(s) that are the same as other medications prescribed for you. Read the directions carefully, and ask your doctor or other care provider to review them with you.

## 2018-03-12 NOTE — PATIENT INSTRUCTIONS
Bronchitis with Wheezing (Viral or Bacterial: Adult)    Bronchitis is an infection of the air passages. It often occurs during a cold and is usually caused by a virus. Symptoms include cough with mucus (phlegm) and low-grade fever. This illness is contagious during the first few days and is spread through the air by coughing and sneezing, or by direct contact (touching the sick person and then touching your own eyes, nose, or mouth).  If there is a lot of inflammation, air flow is restricted. The air passages may also go into spasm, especially if you have asthma. This causes wheezing and difficulty breathing even in people who do not have asthma.  Bronchitis usually lasts 7 to 14 days. The wheezing should improve with treatment during the first week. An inhaler is often prescribed to relax the air passages and stop wheezing. Antibiotics will be prescribed if your doctor thinks there is also a secondary bacterial infection.  Home care    If symptoms are severe, rest at home for the first 2 to 3 days. When you go back to your usual activities, don't let yourself get too tired.    Do not smoke. Also avoid being exposed to secondhand smoke.    You may use over-the-counter medicine to control fever or pain, unless another medicine was prescribed. Note: If you have chronic liver or kidney disease or have ever had a stomach ulcer or gastrointestinal bleeding, talk with your healthcare provider before using these medicines. Also talk to your provider if you are taking medicine to prevent blood clots.) Aspirin should never be given to anyone younger than 18 years of age who is ill with a viral infection or fever. It may cause severe liver or brain damage.    Your appetite may be poor, so a light diet is fine. Avoid dehydration by drinking 6 to 8 glasses of fluids per day (such as water, soft drinks, sports drinks, juices, tea, or soup). Extra fluids will help loosen secretions in the nose and lungs.    Over-the-counter  cough, cold, and sore-throat medicines will not shorten the length of the illness, but they may be helpful to reduce symptoms. (Note: Do not use decongestants if you have high blood pressure.)    If you were given an inhaler, use it exactly as directed. If you need to use it more often than prescribed, your condition may be worsening. If this happens, contact your healthcare provider.    If prescribed, finish all antibiotic medicine, even if you are feeling better after only a few days.  Follow-up care  Follow up with your healthcare provider, or as advised. If you had an X-ray or ECG (electrocardiogram), a specialist will review it. You will be notified of any new findings that may affect your care.  Note: If you are age 65 or older, or if you have a chronic lung disease or condition that affects your immune system, or you smoke, talk to your healthcare provider about having a pneumococcal vaccinations and a yearly influenza vaccination (flu shot).  When to seek medical advice  Call your healthcare provider right away if any of these occur:    Fever of 100.4 F (38 C) or higher    Coughing up increasing amounts of colored sputum    Weakness, drowsiness, headache, facial pain, ear pain, or a stiff neck  Call 911, or get immediate medical care  Contact emergency services right away if any of these occur.    Coughing up blood    Worsening weakness, drowsiness, headache, or stiff neck    Increased wheezing not helped with medication, shortness of breath, or pain with breathing  Date Last Reviewed: 9/13/2015 2000-2017 [employer]. [address] [place] [address]. All rights reserved. This information is not intended as a substitute for professional medical care. Always follow your healthcare professional's instructions.      [facility]    If you have any questions regarding to your visit please contact your care team:       Team Purple:   Clinic Hours Telephone Number   Dr. Foley  Joseline Blake   7am-7pm  Monday - Thursday   7am-5pm  Fridays  [phone]  (Appointment scheduling available 24/7)    Questions about your Visit?   Team Line:  [phone]   Urgent Care - [place] [facility] - 11am-9pm Monday-Friday Saturday-Sunday- 9am-5pm   [place] - 5pm-9pm Monday-Friday Saturday-Sunday- 9am-5pm  [phone] - [place]   [phone] - [place]       What options do I have for visits at the clinic other than the traditional office visit?  To expand how we care for you, many of our providers are utilizing electronic visits (e-visits) and telephone visits, when medically appropriate, for interactions with their patients rather than a visit in the clinic.   We also offer nurse visits for many medical concerns. Just like any other service, we will bill your insurance company for this type of visit based on time spent on the phone with your provider. Not all insurance companies cover these visits. Please check with your medical insurance if this type of visit is covered. You will be responsible for any charges that are not paid by your insurance.      E-visits via nextsocial:  generally incur a $35.00 fee.  Telephone visits:  Time spent on the phone: *charged based on time that is spent on the phone in increments of 10 minutes. Estimated cost:   5-10 mins $30.00   11-20 mins. $59.00   21-30 mins. $85.00     Use [employer] (secure email communication and access to your chart) to send your primary care provider a message or make an appointment. Ask someone on your Team how to sign up for [employer]social.  For a Price Quote for your services, please call our Consumer Price Line at [phone].  As always, Thank you for trusting us with your health care needs!    Manuela Domingo MA

## 2018-03-15 ENCOUNTER — TELEPHONE (OUTPATIENT)
Dept: FAMILY MEDICINE | Facility: CLINIC | Age: 63
End: 2018-03-15

## 2018-03-15 NOTE — TELEPHONE ENCOUNTER
Reason for Call:  Other call back and prescription    Detailed comments: Pt calling saying that her cough is persisting and needs something else to get it under control. Please call back to discuss if needed     Phone Number Patient can be reached at: Home number on file [phone] (home)    Best Time: any     Can we leave a detailed message on this number? YES    Call taken on 3/15/2018 at 12:31 PM by Jose G Mir

## 2018-03-16 NOTE — TELEPHONE ENCOUNTER
Patient's symptoms are consistent with a viral bronchitis.  She needs to continue to use her inhaler every 4-6 hours, take mucinex/cough suppressant as needed, particularly at night.  Her green phlegm is not something she should worry about as it is incredibly common.  I would put in a prescription for cheratussin to help her sleep, however she is allergic to codeine.  She should take nyquil to help suppress her cough while she sleeps.  She should expect her cough to potentially last up to 3 months jass Blake MD

## 2018-03-16 NOTE — TELEPHONE ENCOUNTER
Reason for Call:  Other call back    Detailed comments: Patient calling again. States is coughing all the time and she can't sleep. States she is coughing up green phlegm. Please call [phone]    Phone Number Patient can be reached at: Cell phone     Best Time: Any    Can we leave a detailed message on this number? YES    Call taken on 3/16/2018 at 11:16 AM by Nancy Fierro

## 2018-03-21 DIAGNOSIS — J45.40 MODERATE PERSISTENT ASTHMA, UNCOMPLICATED: ICD-10-CM

## 2018-03-21 NOTE — TELEPHONE ENCOUNTER
Requested Prescriptions   Pending Prescriptions Disp Refills     EQL ALL DAY ALLERGY-D 5-120 MG per 12 hr tablet [Pharmacy Med Name: EQL All Day Allergy-D Oral Tablet Extended Release 12 Hour 5-120 MG]  Last Written Prescription Date:  02/21/2018  Last Fill Quantity: 48,  # refills: 0   Last office visit: 3/12/2018 with prescribing provider:  macintire   Future Office Visit:   Next 5 appointments (look out 90 days)     Mar 29, 2018  2:00 PM CDT   Office Visit with iVlla Barakat MD   [facility] ([facility]    [address] [place] [id number]   [phone]            Apr 03, 2018  2:00 PM CDT   Return Visit with Petra Tee PA-C   [facility] ([facility])    [address] [place] [id number]   [phone]                  48 tablet 0     Sig: TAKE ONE TABLET BY MOUTH TWICE DAILY    There is no refill protocol information for this order        Elier HINSON (R)

## 2018-03-22 RX ORDER — CETIRIZINE HCL, PSEUDOEPHEDRINE HCL 5; 120 MG/1; MG/1
TABLET, EXTENDED RELEASE ORAL
Qty: 48 TABLET | Refills: 0 | Status: SHIPPED | OUTPATIENT
Start: 2018-03-22 | End: 2018-03-29

## 2018-03-22 NOTE — PROGRESS NOTES
SUBJECTIVE:   Farzana Hughes is a 62 year old female who presents to clinic today for the following health issues:    Depression Followup    Status since last visit: Improved     See PHQ-9 for current symptoms.  Other associated symptoms: None    Complicating factors:   Significant life event:  No   Current substance abuse:  None  Anxiety or Panic symptoms:  No    PHQ-9 4/18/2017 10/11/2017 3/29/2018   Total Score 2 - 1   Q9: Suicide Ideation Not at all Not at all Not at all       PHQ-9  English  PHQ-9   Any Language  Suicide Assessment Five-step Evaluation and Treatment (SAFE-T)  Asthma Follow-Up    Was ACT completed today?    Yes    ACT Total Scores 3/29/2018   ACT TOTAL SCORE -   ASTHMA ER VISITS -   ASTHMA HOSPITALIZATIONS -   ACT TOTAL SCORE (Goal Greater than or Equal to 20) 10   In the past 12 months, how many times did you visit the emergency room for your asthma without being admitted to the hospital? 0   In the past 12 months, how many times were you hospitalized overnight because of your asthma? 0       Recent asthma triggers that patient is dealing with: dust mites, pollens and mold    Amount of exercise or physical activity: 6-7 days/week doing housework    Problems taking medications regularly: No    Medication side effects: none    Diet: regular (no restrictions)    Problem list and histories reviewed & adjusted, as indicated.  Additional history: as documented    BP Readings from Last 3 Encounters:   03/29/18 116/78   03/12/18 118/82   10/11/17 111/73    Wt Readings from Last 3 Encounters:   03/29/18 186 lb 9.6 oz (84.6 kg)   03/12/18 182 lb (82.6 kg)   02/08/18 180 lb (81.6 kg)        Reviewed and updated as needed this visit by clinical staff  Tobacco  Allergies  Meds  Problems       Reviewed and updated as needed this visit by Provider  Allergies  Meds  Problems         ROS:  Constitutional, HEENT, cardiovascular, pulmonary, gi and gu systems are negative, except as otherwise  noted.    OBJECTIVE:     /78  Pulse 95  Temp 97  F (36.1  C) (Oral)  Resp 16  Wt 186 lb 9.6 oz (84.6 kg)  SpO2 97%  BMI 37.69 kg/m2  Body mass index is 37.69 kg/(m^2).  GENERAL: healthy, alert and no distress  EYES: Eyes grossly normal to inspection, PERRL and conjunctivae and sclerae normal  HENT: ear canals and TM's normal, nose and mouth without ulcers or lesions  NECK: no adenopathy, no asymmetry, masses, or scars and thyroid normal to palpation  RESP:scattered wheeze, otherwise normal air movement  CV: regular rate and rhythm, normal S1 S2, no S3 or S4, no murmur, click or rub, no peripheral edema and peripheral pulses strong  ABDOMEN: soft, nontender, no hepatosplenomegaly, no masses and bowel sounds normal  MS: no gross musculoskeletal defects noted, no edema  SKIN: no suspicious lesions or rashes  NEURO: Normal strength and tone, mentation intact and speech normal  PSYCH: mentation appears normal, affect normal/bright    ASSESSMENT/PLAN:     1. Moderate persistent asthma, uncomplicated  Well controlled will refill medication, refer for allergy testing  - cetirizine-psuedoePHEDrine (EQL ALL DAY ALLERGY-D) 5-120 MG per 12 hr tablet; Take 1 tablet by mouth 2 times daily  Dispense: 48 tablet; Refill: 3  - montelukast (SINGULAIR) 10 MG tablet; TAKE ONE TABLET BY MOUTH AT BEDTIME  Dispense: 90 tablet; Refill: 3  - ALLERGY/ASTHMA ADULT REFERRAL  - SPACER BAG/RESERVOIR    2. Hyperlipidemia LDL goal <130  Will refill medication  - atorvastatin (LIPITOR) 40 MG tablet; Take 1 tablet (40 mg) by mouth daily Generic please  Dispense: 90 tablet; Refill: 1  - Lipid panel reflex to direct LDL Non-fasting    3. Migraine without aura and without status migrainosus, not intractable  Will refill medication  - topiramate (TOPAMAX) 25 MG tablet; Take 2 tablets (50 mg) by mouth 2 times daily  Dispense: 120 tablet; Refill: 5    4. Itching  Will refill medication  - doxepin (SINEQUAN) 10 MG capsule; Take 1 capsule (10 mg)  by mouth At Bedtime  Dispense: 90 capsule; Refill: 3    5. Cough  Will refill medication  - albuterol (PROAIR HFA) 108 (90 BASE) MCG/ACT Inhaler; Inhale 2 puffs into the lungs every 4 hours as needed  Dispense: 1 Inhaler; Refill: 3    9. Acute bronchospasm  - albuterol (PROAIR HFA) 108 (90 BASE) MCG/ACT Inhaler; Inhale 2 puffs into the lungs every 4 hours as needed  Dispense: 1 Inhaler; Refill: 3    10. Need for desensitization to allergens  - ALLERGY/ASTHMA ADULT REFERRAL    11. Prediabetes  - CREATININE  - HEMOGLOBIN A1C  - TSH WITH FREE T4 REFLEX  - OPHTHALMOLOGY ADULT REFERRAL  - Lipid panel reflex to direct LDL Non-fasting  - Albumin Random Urine Quantitative with Creat Ratio; Future    12. Screening for breast cancer  - MA SCREENING DIGITAL BILAT - Future  (s+30); Future    Follow-up in 1 month for weight loss    Villa Blake MD  [facility]

## 2018-03-29 ENCOUNTER — OFFICE VISIT (OUTPATIENT)
Dept: FAMILY MEDICINE | Facility: CLINIC | Age: 63
End: 2018-03-29
Payer: COMMERCIAL

## 2018-03-29 VITALS
TEMPERATURE: 97 F | SYSTOLIC BLOOD PRESSURE: 116 MMHG | BODY MASS INDEX: 37.69 KG/M2 | RESPIRATION RATE: 16 BRPM | OXYGEN SATURATION: 97 % | HEART RATE: 95 BPM | DIASTOLIC BLOOD PRESSURE: 78 MMHG | WEIGHT: 186.6 LBS

## 2018-03-29 DIAGNOSIS — R51.9 OCCIPITAL PAIN: ICD-10-CM

## 2018-03-29 DIAGNOSIS — R05.9 COUGH: ICD-10-CM

## 2018-03-29 DIAGNOSIS — E78.5 HYPERLIPIDEMIA LDL GOAL <130: ICD-10-CM

## 2018-03-29 DIAGNOSIS — M54.2 CERVICALGIA: ICD-10-CM

## 2018-03-29 DIAGNOSIS — L29.9 ITCHING: ICD-10-CM

## 2018-03-29 DIAGNOSIS — Z12.39 SCREENING FOR BREAST CANCER: ICD-10-CM

## 2018-03-29 DIAGNOSIS — J45.40 MODERATE PERSISTENT ASTHMA, UNCOMPLICATED: ICD-10-CM

## 2018-03-29 DIAGNOSIS — G43.009 MIGRAINE WITHOUT AURA AND WITHOUT STATUS MIGRAINOSUS, NOT INTRACTABLE: ICD-10-CM

## 2018-03-29 DIAGNOSIS — T14.8XXA EXCORIATION: ICD-10-CM

## 2018-03-29 DIAGNOSIS — J98.01 ACUTE BRONCHOSPASM: ICD-10-CM

## 2018-03-29 DIAGNOSIS — R73.03 PREDIABETES: ICD-10-CM

## 2018-03-29 DIAGNOSIS — Z51.6 NEED FOR DESENSITIZATION TO ALLERGENS: Primary | ICD-10-CM

## 2018-03-29 LAB
CHOLEST SERPL-MCNC: 190 MG/DL
CREAT SERPL-MCNC: 1.06 MG/DL (ref 0.52–1.04)
GFR SERPL CREATININE-BSD FRML MDRD: 53 ML/MIN/1.7M2
HBA1C MFR BLD: 6.4 % (ref 0–6.4)
HDLC SERPL-MCNC: 50 MG/DL
LDLC SERPL CALC-MCNC: 88 MG/DL
NONHDLC SERPL-MCNC: 140 MG/DL
TRIGL SERPL-MCNC: 260 MG/DL
TSH SERPL DL<=0.005 MIU/L-ACNC: 0.82 MU/L (ref 0.4–4)

## 2018-03-29 PROCEDURE — 80061 LIPID PANEL: CPT | Performed by: FAMILY MEDICINE

## 2018-03-29 PROCEDURE — A4627 SPACER BAG/RESERVOIR: HCPCS | Performed by: FAMILY MEDICINE

## 2018-03-29 PROCEDURE — 82565 ASSAY OF CREATININE: CPT | Performed by: FAMILY MEDICINE

## 2018-03-29 PROCEDURE — 36415 COLL VENOUS BLD VENIPUNCTURE: CPT | Performed by: FAMILY MEDICINE

## 2018-03-29 PROCEDURE — 84443 ASSAY THYROID STIM HORMONE: CPT | Performed by: FAMILY MEDICINE

## 2018-03-29 PROCEDURE — 99214 OFFICE O/P EST MOD 30 MIN: CPT | Performed by: FAMILY MEDICINE

## 2018-03-29 PROCEDURE — 83036 HEMOGLOBIN GLYCOSYLATED A1C: CPT | Performed by: FAMILY MEDICINE

## 2018-03-29 RX ORDER — TOPIRAMATE 25 MG/1
50 TABLET, FILM COATED ORAL 2 TIMES DAILY
Qty: 120 TABLET | Refills: 5 | Status: SHIPPED | OUTPATIENT
Start: 2018-03-29 | End: 2019-04-30

## 2018-03-29 RX ORDER — ALBUTEROL SULFATE 90 UG/1
2 AEROSOL, METERED RESPIRATORY (INHALATION) EVERY 4 HOURS PRN
Qty: 1 INHALER | Refills: 3 | Status: SHIPPED | OUTPATIENT
Start: 2018-03-29 | End: 2019-07-11

## 2018-03-29 RX ORDER — MONTELUKAST SODIUM 10 MG/1
TABLET ORAL
Qty: 90 TABLET | Refills: 3 | Status: SHIPPED | OUTPATIENT
Start: 2018-03-29 | End: 2019-04-11

## 2018-03-29 RX ORDER — LORAZEPAM 1 MG/1
TABLET ORAL
Qty: 2 TABLET | Refills: 0 | Status: CANCELLED | OUTPATIENT
Start: 2018-03-29

## 2018-03-29 RX ORDER — CETIRIZINE HYDROCHLORIDE, PSEUDOEPHEDRINE HYDROCHLORIDE 5; 120 MG/1; MG/1
1 TABLET, FILM COATED, EXTENDED RELEASE ORAL 2 TIMES DAILY
Qty: 48 TABLET | Refills: 3 | Status: SHIPPED | OUTPATIENT
Start: 2018-03-29 | End: 2018-08-06

## 2018-03-29 RX ORDER — ATORVASTATIN CALCIUM 40 MG/1
40 TABLET, FILM COATED ORAL DAILY
Qty: 90 TABLET | Refills: 1 | Status: SHIPPED | OUTPATIENT
Start: 2018-03-29 | End: 2018-10-15

## 2018-03-29 RX ORDER — DOXEPIN HYDROCHLORIDE 10 MG/1
10 CAPSULE ORAL AT BEDTIME
Qty: 90 CAPSULE | Refills: 3 | Status: SHIPPED | OUTPATIENT
Start: 2018-03-29 | End: 2019-04-30

## 2018-03-29 NOTE — MR AVS SNAPSHOT
After Visit Summary   3/29/2018    Farzana Hughes    MRN: [id number]           Patient Information     Date Of Birth          1955        Visit Information        Provider Department      3/29/2018 2:00 PM Villa Blake MD [facility]        Today's Diagnoses     Need for desensitization to allergens    -  1    Moderate persistent asthma, uncomplicated        Hyperlipidemia LDL goal <130        Migraine without aura and without status migrainosus, not intractable        Excoriation        Itching        Cervicalgia        Occipital pain        Cough        Acute bronchospasm        Prediabetes        Screening for breast cancer          Care Instructions    [facility]    If you have any questions regarding to your visit please contact your care team:       Team Purple:   Clinic Hours Telephone Number   Dr. Alaina Blake   7am-7pm  Monday - Thursday   7am-5pm  Fridays  [phone]  (Appointment scheduling available 24/7)    Questions about your Visit?   Team Line:  [phone]   Urgent Care - [place] and [place] - 11am-9pm Monday-Friday Saturday-Sunday- 9am-5pm   [place] - 5pm-9pm Monday-Friday Saturday-Sunday- 9am-5pm  [phone] - [place]   [phone] - [place]       What options do I have for visits at the clinic other than the traditional office visit?  To expand how we care for you, many of our providers are utilizing electronic visits (e-visits) and telephone visits, when medically appropriate, for interactions with their patients rather than a visit in the clinic.   We also offer nurse visits for many medical concerns. Just like any other service, we will bill your insurance company for this type of visit based on time spent on the phone with your provider. Not all insurance companies cover these visits. Please check with your medical insurance if this  type of visit is covered. You will be responsible for any charges that are not paid by your insurance.      E-visits via Peoplefilter Technologyhart:  generally incur a $35.00 fee.  Telephone visits:  Time spent on the phone: *charged based on time that is spent on the phone in increments of 10 minutes. Estimated cost:   5-10 mins $30.00   11-20 mins. $59.00   21-30 mins. $85.00     Use Datavolutiont (secure email communication and access to your chart) to send your primary care provider a message or make an appointment. Ask someone on your Team how to sign up for [employer].  For a Price Quote for your services, please call our [employer] Line at [phone].  As always, Thank you for trusting us with your health care needs!      Discharged by: Anyi.             Follow-ups after your visit        Additional Services     ALLERGY/ASTHMA ADULT REFERRAL       Your provider has referred you to: [facility]: [facility] [phone]  [email or web address]    Please be aware that coverage of these services is subject to the terms and limitations of your health insurance plan.  Call member services at your health plan with any benefit or coverage questions.      Please bring the following with you to your appointment:    (1) Any X-Rays, CTs or MRIs which have been performed.  Contact the facility where they were done to arrange for  prior to your scheduled appointment.    (2) List of current medications  (3) This referral request   (4) Any documents/labs given to you for this referral            OPHTHALMOLOGY ADULT REFERRAL       Your provider has referred you to: [facility]: [facility] [phone]   [email or web address]    Please be aware that coverage of these services is subject to the terms and limitations of your health insurance plan.  Call member services at your health plan with any benefit or coverage questions.      Please bring the following with you to  your appointment:    (1) Any X-Rays, CTs or MRIs which have been performed.  Contact the facility where they were done to arrange for  prior to your scheduled appointment.    (2) List of current medications  (3) This referral request   (4) Any documents/labs given to you for this referral                  Follow-up notes from your care team     Return in about 1 month (around 4/29/2018) for weight loss visit.      Your next 10 appointments already scheduled     Apr 03, 2018  2:00 PM CDT   Return Visit with Petra Tee PA-C   [facility]    [address] [address]   [phone]              Future tests that were ordered for you today     Open Future Orders        Priority Expected Expires Ordered    MA SCREENING DIGITAL BILAT - Future  (s+30) Routine  3/22/2019 3/29/2018            Who to contact     If you have questions or need follow up information about today's clinic visit or your schedule please contact [facility] directly at [phone].  Normal or non-critical lab and imaging results will be communicated to you by CoworkingONhart, letter or phone within 4 business days after the clinic has received the results. If you do not hear from us within 7 days, please contact the clinic through No [employer] or phone. If you have a critical or abnormal lab result, we will notify you by phone as soon as possible.  Submit refill requests through Futurlink or call your pharmacy and they will forward the refill request to us. Please allow 3 business days for your refill to be completed.          Additional Information About Your Visit        [email or web address] Information     Futurlink gives you secure access to your electronic health record. If you see a primary care provider, you can also send messages to your care team and make appointments. If you have questions, please call your primary care clinic.  If you do not have a primary care provider,  please call [phone] and they will assist you.        Care EveryWhere ID     This is your Care EveryWhere ID. This could be used by other organizations to access your [facility] medical records  [id number]        Your Vitals Were     Pulse Temperature Respirations Pulse Oximetry BMI (Body Mass Index)       95 97  F (36.1  C) (Oral) 16 97% 37.69 kg/m2        Blood Pressure from Last 3 Encounters:   03/29/18 116/78   03/12/18 118/82   10/11/17 111/73    Weight from Last 3 Encounters:   03/29/18 186 lb 9.6 oz (84.6 kg)   03/12/18 182 lb (82.6 kg)   02/08/18 180 lb (81.6 kg)              We Performed the Following     Albumin Random Urine Quantitative with Creat Ratio     ALLERGY/ASTHMA ADULT REFERRAL     CREATININE     HEMOGLOBIN A1C     Lipid panel reflex to direct LDL Non-fasting     OPHTHALMOLOGY ADULT REFERRAL     SPACER BAG/RESERVOIR     TSH WITH FREE T4 REFLEX          Today's Medication Changes          These changes are accurate as of 3/29/18  2:42 PM.  If you have any questions, ask your nurse or doctor.               These medicines have changed or have updated prescriptions.        Dose/Directions    cetirizine-psuedoePHEDrine 5-120 MG per 12 hr tablet   Commonly known as:  EQL ALL DAY ALLERGY-D   This may have changed:  See the new instructions.   Used for:  Moderate persistent asthma, uncomplicated   Changed by:  Villa Barakat MD        Dose:  1 tablet   Take 1 tablet by mouth 2 times daily   Quantity:  48 tablet   Refills:  3            Where to get your medicines      These medications were sent to [facility] [address] [place] [address], [place] [id number] [place] [address], [facility] [address]     Phone:  [phone]     albuterol 108 (90 BASE) MCG/ACT Inhaler    atorvastatin 40 MG tablet    doxepin 10 MG capsule    montelukast 10 MG tablet    topiramate 25 MG tablet         Some of these will need a paper prescription and others can be bought over the counter.  Ask  your nurse if you have questions.     Bring a paper prescription for each of these medications     cetirizine-psuedoePHEDrine 5-120 MG per 12 hr tablet                Primary Care Provider Office Phone # Fax #    DREW Gardner [phone] [address] [address]        Equal Access to Services     ALEKS ALBARADO : Hadii aad ku hadasho Soomaali, waaxda luqadaha, qaybta kaalmada adeegyada, waxay idiin hayaan adeeg kharash la'aan . So [facility] [phone].    ATENCIÓN: Si habla español, tiene a coughlin disposición servicios gratuitos de asistencia lingüística. Llame al [phone].    We comply with applicable federal civil rights laws and [place] laws. We do not discriminate on the basis of race, color, national origin, age, disability, sex, sexual orientation, or gender identity.            Thank you!     Thank you for choosing [facility]  for your care. Our goal is always to provide you with excellent care. Hearing back from our patients is one way we can continue to improve our services. Please take a few minutes to complete the written survey that you may receive in the mail after your visit with us. Thank you!             Your Updated Medication List - Protect others around you: Learn how to safely use, store and throw away your medicines at www.disposemymeds.org.          This list is accurate as of 3/29/18  2:42 PM.  Always use your most recent med list.                   Brand Name Dispense Instructions for use Diagnosis    * ACCU-CHEK ELLA Kit     1 kit    1 Device daily.    Family history of diabetes mellitus       * blood glucose monitoring meter device kit    no brand specified    1 kit    Use to test blood sugars two times daily or as directed.    Type 2 diabetes, diet controlled (H)       * blood glucose monitoring meter device kit     1 kit    Use to test blood sugars two times daily or as directed.    Type 2 diabetes, diet controlled (H)        albuterol 108 (90 BASE) MCG/ACT Inhaler    PROAIR HFA    1 Inhaler    Inhale 2 puffs into the lungs every 4 hours as needed    Cough, Acute bronchospasm       ASPIRIN NOT PRESCRIBED    INTENTIONAL    0 each    Antiplatelet medication not prescribed intentionally due to Allergy        atorvastatin 40 MG tablet    LIPITOR    90 tablet    Take 1 tablet (40 mg) by mouth daily Generic please    Hyperlipidemia LDL goal <130       azelastine 0.1 % spray    ASTELIN    1 Bottle    Spray 1-2 sprays into both nostrils 2 times daily    Acute bronchitis due to other specified organisms       azithromycin 250 MG tablet    ZITHROMAX    6 tablet    Two tablets first day, then one tablet daily for four days.    Acute bronchitis due to other specified organisms, Moderate persistent asthma with exacerbation       beclomethasone 40 MCG/ACT Inhaler    QVAR    1 Inhaler    Inhale 2 puffs into the lungs 2 times daily    Chronic cough       * blood glucose monitoring test strip    Yasmo CONTOUR    1 Box    Use to test blood sugars two times daily or as directed.    Type 2 diabetes, diet controlled (H)       * blood glucose monitoring test strip    no brand specified    1 Box    180 strips by In Vitro route 2 times daily    Type 2 diabetes, diet controlled (H)       cetirizine-psuedoePHEDrine 5-120 MG per 12 hr tablet    EQL ALL DAY ALLERGY-D    48 tablet    Take 1 tablet by mouth 2 times daily    Moderate persistent asthma, uncomplicated       doxepin 10 MG capsule    SINEquan    90 capsule    Take 1 capsule (10 mg) by mouth At Bedtime    Excoriation, Itching       fluticasone 50 MCG/ACT spray    FLONASE    16 g    Spray 2 sprays into both nostrils daily    Moderate persistent asthma, uncomplicated       GABAPENTIN PO      Take 100 mg by mouth        Garlic 1000 MG Caps      1 tablet twice daily        levalbuterol 1.25 MG/3ML neb solution    XOPENEX    270 mL    Take 3 mLs (1.25 mg) by nebulization every 4 hours as needed    Seasonal  allergies       lisinopril 20 MG tablet    PRINIVIL/ZESTRIL    45 tablet    Take 0.5 tablets (10 mg) by mouth daily    Hypertension goal BP (blood pressure) < 130/80       LORazepam 1 MG tablet    ATIVAN    2 tablet    Take 1 tablet 30 minutes prior to MRI. Take another 1/2-1 tablet just prior to procedure if needed.  Do not operate a vehicle after taking this medication    Cervicalgia, Migraine without aura and without status migrainosus, not intractable, Occipital pain       montelukast 10 MG tablet    SINGULAIR    90 tablet    TAKE ONE TABLET BY MOUTH AT BEDTIME    Moderate persistent asthma, uncomplicated       omeprazole 40 MG capsule    priLOSEC    90 capsule    TAKE 1 CAPSULE (40 MG) BY MOUTH DAILY. TAKE 30 TO 60 MINUTES BEFORE A MEAL    Gastroesophageal reflux disease without esophagitis       * ONETOUCH LANCETS Misc     1 Box    90 Devices 2 times daily    Type 2 diabetes, diet controlled (H)       * blood glucose monitoring lancets     1 Box    Use to test blood sugar two times daily or as directed.    Type 2 diabetes, diet controlled (H)       order for DME     1 each    BP cuff, brand as covered by insurance.  Dx: HTN    Benign hypertension       * order for DME     1 Units    Crutches        * order for DME     1 Device    Trilok Ankle Brace    Sprain of right ankle, unspecified ligament, subsequent encounter       * order for DME     1 Units    Equipment being ordered: Hinged knee brace    Arthralgia of right lower leg, Knee injury, right, subsequent encounter       * order for DME     1 Device    CAM walker - short    Extensor tendinitis of foot, Right foot pain       * order for DME     1 Device    Hinged knee brace - medium    Knee injury, right, subsequent encounter, Patellofemoral stress syndrome of right knee, Chondromalacia of patella, right       * order for DME     1 Units    Equipment being ordered: Silicone heel cup    Pain of right heel, Plantar fasciitis, right, Heel spur, right        rizatriptan 5 MG tablet    MAXALT          scopolamine 1.5 MG patch 72 hr    TRANSDERM-SCOP    4 patch    Place onto the skin every 72 hours    Travel sickness, sequela       topiramate 25 MG tablet    TOPAMAX    120 tablet    Take 2 tablets (50 mg) by mouth 2 times daily    Migraine without aura and without status migrainosus, not intractable       * Notice:  This list has 13 medication(s) that are the same as other medications prescribed for you. Read the directions carefully, and ask your doctor or other care provider to review them with you.

## 2018-03-29 NOTE — LETTER
[facility]  [address] [place] [address]    April 5, 2018    Farzana Hughes  [address] [place] [address]          Dear First Farazna off, your labwork shows your kidneys are working a little over time as their filtration rate is a little slow.  Try to stay hydrated as much as your can and we'll see if it improves.  Usually it does.  Your thyroid function is normal.  Your cholesterol is pretty consistent with previous values.  Your HbA1C went up a little bit to 6.4, which is consistent with your previous values.  we will discuss this at your next visit.  Please let me know if you have any questions.     Results for orders placed or performed in visit on 03/29/18   CREATININE   Result Value Ref Range    Creatinine 1.06 (H) 0.52 - 1.04 mg/dL    GFR Estimate 53 (L) >60 mL/min/1.7m2    GFR Estimate If Black 64 >60 mL/min/1.7m2   HEMOGLOBIN A1C   Result Value Ref Range    Hemoglobin A1C 6.4 0 - 6.4 %   TSH WITH FREE T4 REFLEX   Result Value Ref Range    TSH 0.82 0.40 - 4.00 mU/L   Lipid panel reflex to direct LDL Non-fasting   Result Value Ref Range    Cholesterol 190 <200 mg/dL    Triglycerides 260 (H) <150 mg/dL    HDL Cholesterol 50 >49 mg/dL    LDL Cholesterol Calculated 88 <100 mg/dL    Non HDL Cholesterol 140 (H) <130 mg/dL       If you have any questions or concerns, please me or my clinic team at [phone].      Sincerely,        Villa Barakat MD/bt

## 2018-03-29 NOTE — PATIENT INSTRUCTIONS
[facility]    If you have any questions regarding to your visit please contact your care team:       Team Purple:   Clinic Hours Telephone Number   Dr. Alaina Blake   7am-7pm  Monday - Thursday   7am-5pm  Fridays  [phone]  (Appointment scheduling available 24/7)    Questions about your Visit?   Team Line:  [phone]   Urgent Care - [place] and [facility] - 11am-9pm Monday-Friday Saturday-Sunday- 9am-5pm   [place] - 5pm-9pm Monday-Friday Saturday-Sunday- 9am-5pm  [phone] - [facility]  [phone] - [place]       What options do I have for visits at the clinic other than the traditional office visit?  To expand how we care for you, many of our providers are utilizing electronic visits (e-visits) and telephone visits, when medically appropriate, for interactions with their patients rather than a visit in the clinic.   We also offer nurse visits for many medical concerns. Just like any other service, we will bill your insurance company for this type of visit based on time spent on the phone with your provider. Not all insurance companies cover these visits. Please check with your medical insurance if this type of visit is covered. You will be responsible for any charges that are not paid by your insurance.      E-visits via Redfin:  generally incur a $35.00 fee.  Telephone visits:  Time spent on the phone: *charged based on time that is spent on the phone in increments of 10 minutes. Estimated cost:   5-10 mins $30.00   11-20 mins. $59.00   21-30 mins. $85.00     Use Jintronixhart (secure email communication and access to your chart) to send your primary care provider a message or make an appointment. Ask someone on your Team how to sign up for [employer].  For a Price Quote for your services, please call our Consumer Price Line at [phone].  As always, Thank you for trusting us with your health care  needs!      Discharged by: Anyi.

## 2018-03-30 ASSESSMENT — PATIENT HEALTH QUESTIONNAIRE - PHQ9: SUM OF ALL RESPONSES TO PHQ QUESTIONS 1-9: 1

## 2018-03-30 ASSESSMENT — ASTHMA QUESTIONNAIRES: ACT_TOTALSCORE: 10

## 2018-04-03 NOTE — PROGRESS NOTES
SUBJECTIVE:   Farzana Hughes is a 62 year old female who presents to clinic today for the following health issues:    Chief Complaint   Patient presents with     RECHECK     Weight and kidney function     Health Maintenance     Pneumovax, Microalbumin, Mammo, Eye Exam      Patient presents for obesity visit and to discuss kidney function.  She is trying to adopt a more healthy diet regimen and would like to start exercising when the weather improves.    Problem list and histories reviewed & adjusted, as indicated.  Additional history: as documented    BP Readings from Last 3 Encounters:   04/30/18 111/72   04/26/18 120/82   03/29/18 116/78    Wt Readings from Last 3 Encounters:   04/30/18 184 lb 12.8 oz (83.8 kg)   04/26/18 185 lb 12.8 oz (84.3 kg)   03/29/18 186 lb 9.6 oz (84.6 kg)        Reviewed and updated as needed this visit by clinical staff  Allergies  Meds  Problems       Reviewed and updated as needed this visit by Provider  Allergies  Meds  Problems         ROS:  Constitutional, HEENT, cardiovascular, pulmonary, gi and gu systems are negative, except as otherwise noted.    OBJECTIVE:     /82  Pulse 102  Temp 97  F (36.1  C) (Oral)  Resp 14  Wt 185 lb 12.8 oz (84.3 kg)  SpO2 98%  BMI 37.53 kg/m2  Body mass index is 37.53 kg/(m^2).  GENERAL: healthy, alert and no distress  EYES: Eyes grossly normal to inspection, PERRL and conjunctivae and sclerae normal  HENT: ear canals and TM's normal, nose and mouth without ulcers or lesions  NECK: no adenopathy, no asymmetry, masses, or scars and thyroid normal to palpation  RESP: lungs clear to auscultation - no rales, rhonchi or wheezes  CV: regular rate and rhythm, normal S1 S2, no S3 or S4, no murmur, click or rub, no peripheral edema and peripheral pulses strong  SKIN: no suspicious lesions or rashes  PSYCH: mentation appears normal, affect normal/bright    ASSESSMENT/PLAN:     1. Elevated serum creatinine  Will recheck urine albumin for risk  stratification of kidney disease  - Albumin Random Urine Quantitative with Creat Ratio    2. Benign essential hypertension  Well controlled at this time  - Albumin Random Urine Quantitative with Creat Ratio    3. BMI 37.0-37.9, adult  Discussed healthy diet options, importance of regular cardiovascular exercise    4. Visit for screening mammogram  - MA SCREENING DIGITAL BILAT - Future  (s+30); Future    5. Need for prophylactic vaccination against Streptococcus pneumoniae (pneumococcus)  Patient declined at this time      Follow-up in 6 months    Villa Blake MD  [facility]

## 2018-04-26 ENCOUNTER — OFFICE VISIT (OUTPATIENT)
Dept: FAMILY MEDICINE | Facility: CLINIC | Age: 63
End: 2018-04-26
Payer: COMMERCIAL

## 2018-04-26 VITALS
WEIGHT: 185.8 LBS | OXYGEN SATURATION: 98 % | SYSTOLIC BLOOD PRESSURE: 120 MMHG | RESPIRATION RATE: 14 BRPM | BODY MASS INDEX: 37.53 KG/M2 | TEMPERATURE: 97 F | DIASTOLIC BLOOD PRESSURE: 82 MMHG | HEART RATE: 102 BPM

## 2018-04-26 DIAGNOSIS — I10 BENIGN ESSENTIAL HYPERTENSION: ICD-10-CM

## 2018-04-26 DIAGNOSIS — Z12.31 VISIT FOR SCREENING MAMMOGRAM: ICD-10-CM

## 2018-04-26 DIAGNOSIS — R79.89 ELEVATED SERUM CREATININE: Primary | ICD-10-CM

## 2018-04-26 DIAGNOSIS — Z23 NEED FOR PROPHYLACTIC VACCINATION AGAINST STREPTOCOCCUS PNEUMONIAE (PNEUMOCOCCUS): ICD-10-CM

## 2018-04-26 LAB
CREAT UR-MCNC: 96 MG/DL
MICROALBUMIN UR-MCNC: <5 MG/L
MICROALBUMIN/CREAT UR: NORMAL MG/G CR (ref 0–25)

## 2018-04-26 PROCEDURE — 99213 OFFICE O/P EST LOW 20 MIN: CPT | Performed by: FAMILY MEDICINE

## 2018-04-26 PROCEDURE — 82043 UR ALBUMIN QUANTITATIVE: CPT | Performed by: FAMILY MEDICINE

## 2018-04-26 NOTE — PATIENT INSTRUCTIONS
[facility]    If you have any questions regarding to your visit please contact your care team:       Team Purple:   Clinic Hours Telephone Number   Dr. Alaina Blake   7am-7pm  Monday - Thursday   7am-5pm  Fridays  [phone]  (Appointment scheduling available 24/7)    Questions about your recent visit?   Team Line:  [phone]   Urgent Care - [place] and [facility] - 11am-9pm Monday-Friday Saturday-Sunday- 9am-5pm   [place] - 5pm-9pm Monday-Friday Saturday-Sunday- 9am-5pm  [phone] - [place]  [phone] - [place]       What options do I have for a visit other than an office visit? We offer electronic visits (e-visits) and telephone visits, when medically appropriate.  Please check with your medical insurance to see if these types of visits are covered, as you will be responsible for any charges that are not paid by your insurance.      You can use [employer] (secure electronic communication) to access to your chart, send your primary care provider a message, or make an appointment. Ask a team member how to get started.     For a price quote for your services, please call our Consumer Price Line at [phone] or our Imaging Cost estimation line at [phone] (for imaging tests).    Mariajose Guerrier MA

## 2018-04-26 NOTE — MR AVS SNAPSHOT
After Visit Summary   4/26/2018    Farzana Hughes    MRN: [id number]           Patient Information     Date Of Birth          1955        Visit Information        Provider Department      4/26/2018 2:20 PM Villa Blake MD [facility]        Today's Diagnoses     Elevated serum creatinine    -  1    Visit for screening mammogram        Need for prophylactic vaccination against Streptococcus pneumoniae (pneumococcus)        Benign essential hypertension          Care Instructions    [facility]    If you have any questions regarding to your visit please contact your care team:       Team Purple:   Clinic Hours Telephone Number   Dr. Alaina Blake   7am-7pm  Monday - Thursday   7am-5pm  Fridays  [phone]  (Appointment scheduling available 24/7)    Questions about your recent visit?   Team Line:  [phone]   Urgent Care - [place] and [facility] - 11am-9pm Monday-Friday Saturday-Sunday- 9am-5pm   [place] - 5pm-9pm Monday-Friday Saturday-Sunday- 9am-5pm  [phone] - [place]  [phone] - [place]       What options do I have for a visit other than an office visit? We offer electronic visits (e-visits) and telephone visits, when medically appropriate.  Please check with your medical insurance to see if these types of visits are covered, as you will be responsible for any charges that are not paid by your insurance.      You can use [employer] (secure electronic communication) to access to your chart, send your primary care provider a message, or make an appointment. Ask a team member how to get started.     For a price quote for your services, please call our Consumer Price Line at [phone] or our Imaging Cost estimation line at [phone] (for imaging tests).    Mariajose Guerrier MA            Follow-ups after your visit        Follow-up notes from your care team      Return in about 6 months (around 10/26/2018) for kidney function.      Your next 10 appointments already scheduled     Apr 30, 2018  2:40 PM CDT   New Visit with Karthik Hernandez MD   [facility] [place] ([facility])    [address]  [place] [address]   [phone]              Future tests that were ordered for you today     Open Future Orders        Priority Expected Expires Ordered    MA SCREENING DIGITAL BILAT - Future  (s+30) Routine  4/3/2019 4/26/2018            Who to contact     If you have questions or need follow up information about today's clinic visit or your schedule please contact [facility] directly at [phone].  Normal or non-critical lab and imaging results will be communicated to you by [employer], letter or phone within 4 business days after the clinic has received the results. If you do not hear from us within 7 days, please contact the clinic through [employer] or phone. If you have a critical or abnormal lab result, we will notify you by phone as soon as possible.  Submit refill requests through [employer] or call your pharmacy and they will forward the refill request to us. Please allow 3 business days for your refill to be completed.          Additional Information About Your Visit        [employer] Information     [employer] gives you secure access to your electronic health record. If you see a primary care provider, you can also send messages to your care team and make appointments. If you have questions, please call your primary care clinic.  If you do not have a primary care provider, please call [phone] and they will assist you.        Care EveryWhere ID     This is your Care EveryWhere ID. This could be used by other organizations to access your [place] medical records  [id number]        Your Vitals Were     Pulse Temperature Respirations Pulse Oximetry BMI (Body Mass Index)       102 97  F (36.1  C) (Oral) 14 98% 37.53 kg/m2        Blood  Pressure from Last 3 Encounters:   04/26/18 120/82   03/29/18 116/78   03/12/18 118/82    Weight from Last 3 Encounters:   04/26/18 185 lb 12.8 oz (84.3 kg)   03/29/18 186 lb 9.6 oz (84.6 kg)   03/12/18 182 lb (82.6 kg)              We Performed the Following     Albumin Random Urine Quantitative with Creat Ratio        Primary Care Provider Office Phone # Fax #    DREW Gardner [facility] [phone] [address] [facility] [address]        Equal Access to Services     ALEKS ALBARADO : Hadii aad ku hadasho Soomaali, waaxda luqadaha, qaybta kaalmada adeegyada, ciarra weller . So [facility] [phone].    ATENCIÓN: Si habla español, tiene a coughlin disposición servicios gratuitos de asistencia lingüística. Llame al [phone].    We comply with applicable federal civil rights laws and [place] laws. We do not discriminate on the basis of race, color, national origin, age, disability, sex, sexual orientation, or gender identity.            Thank you!     Thank you for choosing [facility] [place]  for your care. Our goal is always to provide you with excellent care. Hearing back from our patients is one way we can continue to improve our services. Please take a few minutes to complete the written survey that you may receive in the mail after your visit with us. Thank you!             Your Updated Medication List - Protect others around you: Learn how to safely use, store and throw away your medicines at www.disposemymeds.org.          This list is accurate as of 4/26/18  3:04 PM.  Always use your most recent med list.                   Brand Name Dispense Instructions for use Diagnosis    * ACCU-CHEK ELLA Kit     1 kit    1 Device daily.    Family history of diabetes mellitus       * blood glucose monitoring meter device kit    no brand specified    1 kit    Use to test blood sugars two times daily or as directed.    Type 2 diabetes, diet controlled (H)       * blood  glucose monitoring meter device kit     1 kit    Use to test blood sugars two times daily or as directed.    Type 2 diabetes, diet controlled (H)       albuterol 108 (90 Base) MCG/ACT Inhaler    PROAIR HFA    1 Inhaler    Inhale 2 puffs into the lungs every 4 hours as needed    Cough, Acute bronchospasm       ASPIRIN NOT PRESCRIBED    INTENTIONAL    0 each    Antiplatelet medication not prescribed intentionally due to Allergy        atorvastatin 40 MG tablet    LIPITOR    90 tablet    Take 1 tablet (40 mg) by mouth daily Generic please    Hyperlipidemia LDL goal <130       azelastine 0.1 % spray    ASTELIN    1 Bottle    Spray 1-2 sprays into both nostrils 2 times daily    Acute bronchitis due to other specified organisms       beclomethasone 40 MCG/ACT Inhaler    QVAR    1 Inhaler    Inhale 2 puffs into the lungs 2 times daily    Chronic cough       * blood glucose monitoring test strip    DARLENE CONTOUR    1 Box    Use to test blood sugars two times daily or as directed.    Type 2 diabetes, diet controlled (H)       * blood glucose monitoring test strip    no brand specified    1 Box    180 strips by In Vitro route 2 times daily    Type 2 diabetes, diet controlled (H)       cetirizine-psuedoePHEDrine 5-120 MG per 12 hr tablet    EQL ALL DAY ALLERGY-D    48 tablet    Take 1 tablet by mouth 2 times daily    Moderate persistent asthma, uncomplicated       doxepin 10 MG capsule    SINEquan    90 capsule    Take 1 capsule (10 mg) by mouth At Bedtime    Excoriation, Itching       fluticasone 50 MCG/ACT spray    FLONASE    16 g    Spray 2 sprays into both nostrils daily    Moderate persistent asthma, uncomplicated       GABAPENTIN PO      Take 100 mg by mouth        Garlic 1000 MG Caps      1 tablet twice daily        levalbuterol 1.25 MG/3ML neb solution    XOPENEX    270 mL    Take 3 mLs (1.25 mg) by nebulization every 4 hours as needed    Seasonal allergies       lisinopril 20 MG tablet    PRINIVIL/ZESTRIL    45 tablet     Take 0.5 tablets (10 mg) by mouth daily    Hypertension goal BP (blood pressure) < 130/80       LORazepam 1 MG tablet    ATIVAN    2 tablet    Take 1 tablet 30 minutes prior to MRI. Take another 1/2-1 tablet just prior to procedure if needed.  Do not operate a vehicle after taking this medication    Cervicalgia, Migraine without aura and without status migrainosus, not intractable, Occipital pain       montelukast 10 MG tablet    SINGULAIR    90 tablet    TAKE ONE TABLET BY MOUTH AT BEDTIME    Moderate persistent asthma, uncomplicated       omeprazole 40 MG capsule    priLOSEC    90 capsule    TAKE 1 CAPSULE (40 MG) BY MOUTH DAILY. TAKE 30 TO 60 MINUTES BEFORE A MEAL    Gastroesophageal reflux disease without esophagitis       * ONETOUCH LANCETS Misc     1 Box    90 Devices 2 times daily    Type 2 diabetes, diet controlled (H)       * blood glucose monitoring lancets     1 Box    Use to test blood sugar two times daily or as directed.    Type 2 diabetes, diet controlled (H)       order for DME     1 each    BP cuff, brand as covered by insurance.  Dx: HTN    Benign hypertension       * order for DME     1 Units    Crutches        * order for DME     1 Device    Trilok Ankle Brace    Sprain of right ankle, unspecified ligament, subsequent encounter       * order for DME     1 Units    Equipment being ordered: Hinged knee brace    Arthralgia of right lower leg, Knee injury, right, subsequent encounter       * order for DME     1 Device    CAM walker - short    Extensor tendinitis of foot, Right foot pain       * order for DME     1 Device    Hinged knee brace - medium    Knee injury, right, subsequent encounter, Patellofemoral stress syndrome of right knee, Chondromalacia of patella, right       * order for DME     1 Units    Equipment being ordered: Silicone heel cup    Pain of right heel, Plantar fasciitis, right, Heel spur, right       rizatriptan 5 MG tablet    MAXALT          scopolamine 1.5 MG patch 72 hr     TRANSDERM-SCOP    4 patch    Place onto the skin every 72 hours    Travel sickness, sequela       topiramate 25 MG tablet    TOPAMAX    120 tablet    Take 2 tablets (50 mg) by mouth 2 times daily    Migraine without aura and without status migrainosus, not intractable       * Notice:  This list has 13 medication(s) that are the same as other medications prescribed for you. Read the directions carefully, and ask your doctor or other care provider to review them with you.

## 2018-04-26 NOTE — LETTER
[facility]  [address] [place] [address]    April 27, 2018    Farzana Hughes  [address] [place] [address]          Dear Farzana,  The lab wasn't able to calculate your urine protein content due to low volume.  We'll repeat your urine lab at your follow-up visit.   Enclosed is a copy of your results.     Results for orders placed or performed in visit on 04/26/18   Albumin Random Urine Quantitative with Creat Ratio   Result Value Ref Range    Creatinine Urine 96 mg/dL    Albumin Urine mg/L <5 mg/L    Albumin Urine mg/g Cr Unable to calculate due to low value 0 - 25 mg/g Cr       If you have any questions or concerns, please call myself or my nurse at [phone].      Sincerely,        Villa Barakat MD/laith

## 2018-04-30 ENCOUNTER — OFFICE VISIT (OUTPATIENT)
Dept: ALLERGY | Facility: CLINIC | Age: 63
End: 2018-04-30
Payer: COMMERCIAL

## 2018-04-30 VITALS
SYSTOLIC BLOOD PRESSURE: 111 MMHG | WEIGHT: 184.8 LBS | BODY MASS INDEX: 34.89 KG/M2 | HEIGHT: 61 IN | HEART RATE: 91 BPM | TEMPERATURE: 97 F | DIASTOLIC BLOOD PRESSURE: 72 MMHG | OXYGEN SATURATION: 98 %

## 2018-04-30 DIAGNOSIS — H10.13 ALLERGIC CONJUNCTIVITIS OF BOTH EYES: ICD-10-CM

## 2018-04-30 DIAGNOSIS — J45.40 MODERATE PERSISTENT ASTHMA WITHOUT COMPLICATION: Primary | ICD-10-CM

## 2018-04-30 DIAGNOSIS — J30.89 OTHER ALLERGIC RHINITIS: ICD-10-CM

## 2018-04-30 DIAGNOSIS — T78.1XXA ADVERSE FOOD REACTION, INITIAL ENCOUNTER: ICD-10-CM

## 2018-04-30 LAB
FEF 25/75: NORMAL
FEV-1: NORMAL
FEV1/FVC: NORMAL
FVC: NORMAL

## 2018-04-30 PROCEDURE — 94010 BREATHING CAPACITY TEST: CPT | Performed by: ALLERGY & IMMUNOLOGY

## 2018-04-30 PROCEDURE — 36415 COLL VENOUS BLD VENIPUNCTURE: CPT | Performed by: ALLERGY & IMMUNOLOGY

## 2018-04-30 PROCEDURE — 86003 ALLG SPEC IGE CRUDE XTRC EA: CPT | Mod: 90 | Performed by: ALLERGY & IMMUNOLOGY

## 2018-04-30 PROCEDURE — 99000 SPECIMEN HANDLING OFFICE-LAB: CPT | Performed by: ALLERGY & IMMUNOLOGY

## 2018-04-30 PROCEDURE — 86003 ALLG SPEC IGE CRUDE XTRC EA: CPT | Mod: 59 | Performed by: ALLERGY & IMMUNOLOGY

## 2018-04-30 PROCEDURE — 99244 OFF/OP CNSLTJ NEW/EST MOD 40: CPT | Mod: 25 | Performed by: ALLERGY & IMMUNOLOGY

## 2018-04-30 RX ORDER — FLUTICASONE PROPIONATE 50 MCG
1 SPRAY, SUSPENSION (ML) NASAL 2 TIMES DAILY
Qty: 1 BOTTLE | Refills: 11 | Status: SHIPPED | OUTPATIENT
Start: 2018-04-30 | End: 2019-07-11

## 2018-04-30 RX ORDER — EPINEPHRINE 0.3 MG/.3ML
0.3 INJECTION SUBCUTANEOUS PRN
Qty: 0.6 ML | Refills: 3 | Status: SHIPPED | OUTPATIENT
Start: 2018-04-30 | End: 2020-09-14

## 2018-04-30 RX ORDER — AZELASTINE HYDROCHLORIDE 137 UG/1
1-2 SPRAY, METERED NASAL 2 TIMES DAILY
Qty: 1 BOTTLE | Refills: 11 | Status: SHIPPED | OUTPATIENT
Start: 2018-04-30 | End: 2018-08-09

## 2018-04-30 NOTE — LETTER
ANAPHYLAXIS ALLERGY PLAN    Name: Farzana Hughes      :  1955    Allergy to:  Shellfish    Weight: 184 lbs 12.8 oz           Asthma:  Yes  (higher risk for a severe reaction)      Do not depend on antihistamines or inhalers (bronchodilators) to treat a severe reaction; USE EPINEPHRINE      MEDICATIONS/DOSES  Epinephrine:  EpiPen/Adrenaclick  Epinephrine dose:  0.3 mg IM  Antihistamine:  Zyrtec (Cetirizine)  Antihistamine dose:  10mg  Other (e.g., inhaler-bronchodilator if wheezing):  Albuterol       ANAPHYLAXIS ALLERGY PLAN (Page 2)  Patient:  Farzana Hughse  :  1955         Electronically signed on 2018 by:  Karthik Hernandez MD  Parent/Guardian Authorization Signature:  ___________________________ Date:    FORM PROVIDED [employer]Y OF [employer]OD ALLERGY [employer] EDUCATION (FARE) ([email or web address]) 2017

## 2018-04-30 NOTE — MR AVS SNAPSHOT
After Visit Summary   4/30/2018    Farzana Hughes    MRN: [id number]           Patient Information     Date Of Birth          1955        Visit Information        Provider Department      4/30/2018 2:40 PM Karthik Hernandez MD [facility]        Today's Diagnoses     Moderate persistent asthma without complication    -  1    Other allergic rhinitis        Allergic conjunctivitis of both eyes        Adverse food reaction, initial encounter          Care Instructions    If you have any questions regarding your allergies, asthma, or what we discussed during your visit today please call the allergy clinic or contact us via [employer].    [facility]: [phone]        1. Start using the flonase (fluticasone) nasal spray every day - 1 spray in each nostril twice a day    2. Also use the astelin (azelastine) nasal spray every day - 1 or 2 sprays in each nostril twice a day    3. Continue to take the singulair (montelukast) - 1 tablet at bedtime    4. You can continue to take the zyrtec-D twice daily as needed      Follow-up in 3 months - sooner if needed          Follow-ups after your visit        Your next 10 appointments already scheduled     Oct 26, 2018  2:00 PM CDT   Office Visit with Villa Barakat MD   [facility] [phone]           Bring a current list of meds and any records pertaining to this visit. For Physicals, please bring immunization records and any forms needing to be filled out. Please arrive 10 minutes early to complete paperwork.              Who to contact     If you have questions or need follow up information about today's clinic visit or your schedule please contact [facility] directly at [phone].  Normal or non-critical lab and imaging results will be communicated to you by MyChart, letter or phone within 4 business  "days after the clinic has received the results. If you do not hear from us within 7 days, please contact the clinic through [employer] or phone. If you have a critical or abnormal lab result, we will notify you by phone as soon as possible.  Submit refill requests through [employer] or call your pharmacy and they will forward the refill request to us. Please allow 3 business days for your refill to be completed.          Additional Information About Your Visit        [employer] Information     [employer] gives you secure access to your electronic health record. If you see a primary care provider, you can also send messages to your care team and make appointments. If you have questions, please call your primary care clinic.  If you do not have a primary care provider, please call [phone] and they will assist you.        Care EveryWhere ID     This is your Care EveryWhere ID. This could be used by other organizations to access your [facility] medical records  [id number]        Your Vitals Were     Pulse Temperature Height Pulse Oximetry BMI (Body Mass Index)       91 97  F (36.1  C) (Oral) 1.54 m (5' 0.63\") 98% 35.35 kg/m2        Blood Pressure from Last 3 Encounters:   04/30/18 111/72   04/26/18 120/82   03/29/18 116/78    Weight from Last 3 Encounters:   04/30/18 83.8 kg (184 lb 12.8 oz)   04/26/18 84.3 kg (185 lb 12.8 oz)   03/29/18 84.6 kg (186 lb 9.6 oz)              We Performed the Following     Allergen alternaria alternata IgE     Allergen jacobo white IgE     Allergen aspergillus fumigatus IgE     Allergen cat epithellium IgE     Allergen Cedar IgE     Allergen cladosporium herbarum IgE     Allergen clam IgE     Allergen codfish IgE     Allergen cottonwood IgE     Allergen crab IgE     Allergen D farinae IgE     Allergen D pteronyssinus IgE     Allergen dog epithelium IgE     Allergen elm IgE     Allergen English plantain IgE     Allergen Epicoccum purpurascens IgE     Allergen flounder IgE     Allergen giant ragweed " IgE     Allergen Sammy IgE     Allergen Halibut IgE     Allergen Bill grass IgE     Allergen lamb's quarter IgE     Allergen lobster IgE     Allergen maple box elder IgE     Allergen Mugwort IgE     Allergen oak white IgE     Allergen orchard grass IgE     Allergen oyster IgE     Allergen penicillium notatum IgE     Allergen ragweed short IgE     Allergen Red [place] IgE     Allergen Sagebrush Wormwood IgE     Allergen salmon IgE     Allergen scallop IgE     Allergen Sheep Sorrel IgE     Allergen shrimp IgE     Allergen silver  birch IgE     Allergen thistle Russian IgE     Allergen arthur IgE     Allergen Tree White [place] IgE     Allergen Trout IgE     Allergen tuna IgE     Allergen [place] Tree     Allergen Weed Nettle IgE     Allergen white pine IgE     Allergen, Kochia/Firebush     OPTICHAMBER     Spirometry, Breathing Capacity          Today's Medication Changes          These changes are accurate as of 4/30/18  3:37 PM.  If you have any questions, ask your nurse or doctor.               Start taking these medicines.        Dose/Directions    EPINEPHrine 0.3 MG/0.3ML injection 2-pack   Commonly known as:  EPIPEN/ADRENACLICK/or ANY BX GENERIC EQUIV   Used for:  Adverse food reaction, initial encounter   Started by:  Karthik Hernandez MD        Dose:  0.3 mg   Inject 0.3 mLs (0.3 mg) into the muscle as needed for anaphylaxis   Quantity:  0.6 mL   Refills:  3         These medicines have changed or have updated prescriptions.        Dose/Directions    * azelastine 0.1 % spray   Commonly known as:  ASTELIN   This may have changed:  Another medication with the same name was added. Make sure you understand how and when to take each.   Used for:  Acute bronchitis due to other specified organisms   Changed by:  Karthik Hernandez MD        Dose:  1-2 spray   Spray 1-2 sprays into both nostrils 2 times daily   Quantity:  1 Bottle   Refills:  1       * Azelastine HCl 137 MCG/SPRAY Soln   This may have changed:  You were  already taking a medication with the same name, and this prescription was added. Make sure you understand how and when to take each.   Used for:  Other allergic rhinitis   Changed by:  Karthik Hernandez MD        Dose:  1-2 spray   Spray 1-2 sprays in nostril 2 times daily   Quantity:  1 Bottle   Refills:  11       fluticasone 50 MCG/ACT spray   Commonly known as:  FLONASE   This may have changed:    - how much to take  - when to take this   Used for:  Other allergic rhinitis, Allergic conjunctivitis of both eyes   Changed by:  Karthik Hernandez MD        Dose:  1 spray   Spray 1 spray into both nostrils 2 times daily   Quantity:  1 Bottle   Refills:  11       * Notice:  This list has 2 medication(s) that are the same as other medications prescribed for you. Read the directions carefully, and ask your doctor or other care provider to review them with you.      Stop taking these medicines if you haven't already. Please contact your care team if you have questions.     beclomethasone 40 MCG/ACT Inhaler   Commonly known as:  QVAR   Stopped by:  Karthik Hernandez MD                Where to get your medicines      These medications were sent to [facility] [address] [place] [address], [facility] [address]     Phone:  [phone]     Azelastine HCl 137 MCG/SPRAY Soln    EPINEPHrine 0.3 MG/0.3ML injection 2-pack    fluticasone 50 MCG/ACT spray                Primary Care Provider Office Phone # Fax #    Oly Fostert, APRN [facility] [phone] [address] [facility] [address]        Equal Access to Services     [facility]: Hadii aad ku hadasho Soomaali, waaxda luqadaha, qaybta kaalmada adeegsarah, ciarra weller . So [facility] [phone].    ATENCIÓN: Si habla español, tiene a coughlin disposición servicios gratuitos de asistencia lingüística. Llame al [phone].    We comply with applicable federal civil rights laws and [place] laws. We do  not discriminate on the basis of race, color, national origin, age, disability, sex, sexual orientation, or gender identity.            Thank you!     Thank you for choosing [facility]  for your care. Our goal is always to provide you with excellent care. Hearing back from our patients is one way we can continue to improve our services. Please take a few minutes to complete the written survey that you may receive in the mail after your visit with us. Thank you!             Your Updated Medication List - Protect others around you: Learn how to safely use, store and throw away your medicines at www.disposemymeds.org.          This list is accurate as of 4/30/18  3:37 PM.  Always use your most recent med list.                   Brand Name Dispense Instructions for use Diagnosis    * ACCU-CHEK ELLA Kit     1 kit    1 Device daily.    Family history of diabetes mellitus       * blood glucose monitoring meter device kit    no brand specified    1 kit    Use to test blood sugars two times daily or as directed.    Type 2 diabetes, diet controlled (H)       * blood glucose monitoring meter device kit     1 kit    Use to test blood sugars two times daily or as directed.    Type 2 diabetes, diet controlled (H)       albuterol 108 (90 Base) MCG/ACT Inhaler    PROAIR HFA    1 Inhaler    Inhale 2 puffs into the lungs every 4 hours as needed    Cough, Acute bronchospasm       atorvastatin 40 MG tablet    LIPITOR    90 tablet    Take 1 tablet (40 mg) by mouth daily Generic please    Hyperlipidemia LDL goal <130       * azelastine 0.1 % spray    ASTELIN    1 Bottle    Spray 1-2 sprays into both nostrils 2 times daily    Acute bronchitis due to other specified organisms       * Azelastine HCl 137 MCG/SPRAY Soln     1 Bottle    Spray 1-2 sprays in nostril 2 times daily    Other allergic rhinitis       * blood glucose monitoring test strip    DARLENE CONTOUR    1 Box    Use to test blood sugars two times daily or as  directed.    Type 2 diabetes, diet controlled (H)       * blood glucose monitoring test strip    no brand specified    1 Box    180 strips by In Vitro route 2 times daily    Type 2 diabetes, diet controlled (H)       cetirizine-psuedoePHEDrine 5-120 MG per 12 hr tablet    EQL ALL DAY ALLERGY-D    48 tablet    Take 1 tablet by mouth 2 times daily    Moderate persistent asthma, uncomplicated       doxepin 10 MG capsule    SINEquan    90 capsule    Take 1 capsule (10 mg) by mouth At Bedtime    Excoriation, Itching       EPINEPHrine 0.3 MG/0.3ML injection 2-pack    EPIPEN/ADRENACLICK/or ANY BX GENERIC EQUIV    0.6 mL    Inject 0.3 mLs (0.3 mg) into the muscle as needed for anaphylaxis    Adverse food reaction, initial encounter       fluticasone 50 MCG/ACT spray    FLONASE    1 Bottle    Spray 1 spray into both nostrils 2 times daily    Other allergic rhinitis, Allergic conjunctivitis of both eyes       GABAPENTIN PO      Take 100 mg by mouth        Garlic 1000 MG Caps      1 tablet twice daily        levalbuterol 1.25 MG/3ML neb solution    XOPENEX    270 mL    Take 3 mLs (1.25 mg) by nebulization every 4 hours as needed    Seasonal allergies       lisinopril 20 MG tablet    PRINIVIL/ZESTRIL    45 tablet    Take 0.5 tablets (10 mg) by mouth daily    Hypertension goal BP (blood pressure) < 130/80       LORazepam 1 MG tablet    ATIVAN    2 tablet    Take 1 tablet 30 minutes prior to MRI. Take another 1/2-1 tablet just prior to procedure if needed.  Do not operate a vehicle after taking this medication    Cervicalgia, Migraine without aura and without status migrainosus, not intractable, Occipital pain       montelukast 10 MG tablet    SINGULAIR    90 tablet    TAKE ONE TABLET BY MOUTH AT BEDTIME    Moderate persistent asthma, uncomplicated       omeprazole 40 MG capsule    priLOSEC    90 capsule    TAKE 1 CAPSULE (40 MG) BY MOUTH DAILY. TAKE 30 TO 60 MINUTES BEFORE A MEAL    Gastroesophageal reflux disease without  esophagitis       * ONETOUCH LANCETS Misc     1 Box    90 Devices 2 times daily    Type 2 diabetes, diet controlled (H)       * blood glucose monitoring lancets     1 Box    Use to test blood sugar two times daily or as directed.    Type 2 diabetes, diet controlled (H)       order for DME     1 each    BP cuff, brand as covered by insurance.  Dx: HTN    Benign hypertension       * order for DME     1 Units    Crutches        * order for DME     1 Device    Trilok Ankle Brace    Sprain of right ankle, unspecified ligament, subsequent encounter       * order for DME     1 Units    Equipment being ordered: Hinged knee brace    Arthralgia of right lower leg, Knee injury, right, subsequent encounter       * order for DME     1 Device    CAM walker - short    Extensor tendinitis of foot, Right foot pain       * order for DME     1 Device    Hinged knee brace - medium    Knee injury, right, subsequent encounter, Patellofemoral stress syndrome of right knee, Chondromalacia of patella, right       * order for DME     1 Units    Equipment being ordered: Silicone heel cup    Pain of right heel, Plantar fasciitis, right, Heel spur, right       rizatriptan 5 MG tablet    MAXALT          scopolamine 1.5 MG patch 72 hr    TRANSDERM-SCOP    4 patch    Place onto the skin every 72 hours    Travel sickness, sequela       topiramate 25 MG tablet    TOPAMAX    120 tablet    Take 2 tablets (50 mg) by mouth 2 times daily    Migraine without aura and without status migrainosus, not intractable       * Notice:  This list has 15 medication(s) that are the same as other medications prescribed for you. Read the directions carefully, and ask your doctor or other care provider to review them with you.

## 2018-04-30 NOTE — NURSING NOTE
RN reviewed Anaphylaxis Action Plan with patient. Educated on the symptoms and treatment of anaphylaxis. Went through the different ways that a reaction can present, and the body systems that it can affect. Patient verbalized understanding.     RN reviewed Asthma Action Plan with patient. Verbalized understanding.No further questions.    Writer demonstrated how to use an EpiPen auto-injector.  Patient instructed to form a fist around the auto-injector, remove blue safety release by pulling straight up, then firmly push orange tip against outer thigh so it clicks, holding for 5 seconds.  Patient advised that once used, needle will not be exposed, as orange tip extends.  Patient advised to call 911 or go to emergency department after epi-pen use for further monitoring.       Writer demonstrated how to use the AdrenClick epinephrine auto-injector.  Patient was instructed to remove auto-injector from casing.  Patient instructed to form a fist around the auto-injector, remove caps labeled 1 and then 2 (never placing finger/thumb over ), then firmly push red tip against outer thigh, holding approximately 10 seconds.  Patient advised that once used, needle WILL be exposed.  Patient is to properly dispose of needle in sharps container and not regular trash can.  Patient advised to call 911 or go to emergency department after epi-pen use for further monitoring.       Clarita Parsons, RN          Clarita Parsons, RN
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
## 2018-04-30 NOTE — LETTER
My Asthma Action Plan  Name: Farzana Hughes   YOB: 1955  Date: 4/30/2018   My doctor: Karthik Hernandez MD   My clinic: [facility]        My Control Medicine: Montelukast (Singulair) -  10 mg Take 1 tablet daily  My Rescue Medicine: Albuterol (Proair/Ventolin/Proventil) inhaler Take 2 to 4 puffs every 4 hours as needed   My Asthma Severity: moderate persistent  Avoid your asthma triggers: smoke, upper respiratory infections, strong odors and fumes, exercise or sports and cold air              GREEN ZONE   Good Control    I feel good    No cough or wheeze    Can work, sleep and play without asthma symptoms       Take your asthma control medicine every day.     1. If exercise triggers your asthma, take your rescue medication    15 minutes before exercise or sports, and    During exercise if you have asthma symptoms  2. Spacer to use with inhaler: If you have a spacer, make sure to use it with your inhaler             YELLOW ZONE Getting Worse  I have ANY of these:    I do not feel good    Cough or wheeze    Chest feels tight    Wake up at night   1. Keep taking your Green Zone medications  2. Start taking your rescue medicine:    every 20 minutes for up to 1 hour. Then every 4 hours for 24-48 hours.  3. If you stay in the Yellow Zone for more than 12-24 hours, contact your doctor.           RED ZONE Medical Alert - Get Help  I have ANY of these:    I feel awful    Medicine is not helping    Breathing getting harder    Trouble walking or talking    Nose opens wide to breathe       1. Take your rescue medicine NOW  2. If your provider has prescribed an oral steroid medicine, start taking it NOW  3. Call your doctor NOW  4. If you are still in the Red Zone after 20 minutes and you have not reached your doctor:    Take your rescue medicine again and    Call 911 or go to the emergency room right away    See your regular doctor within 2 weeks of an Emergency Room or Urgent Care visit for follow-up  treatment.          Annual Reminders:  Meet with Asthma Educator,  Flu Shot in the Fall, consider Pneumonia Vaccination for patients with asthma (aged 19 and older).    Pharmacy:    [id number] - [place] - [address] [facility] [address] - [facility], [place] - [address]                      Asthma Triggers  How To Control Things That Make Your Asthma Worse    Triggers are things that make your asthma worse.  Look at the list below to help you find your triggers and what you can do about them.  You can help prevent asthma flare-ups by staying away from your triggers.      Trigger                                                          What you can do   Cigarette Smoke  Tobacco smoke can make asthma worse. Do not allow smoking in your home, car or around you.  Be sure no one smokes at a child s day care or school.  If you smoke, ask your health care provider for ways to help you quit.  Ask family members to quit too.  Ask your health care provider for a referral to Quit Plan to help you quit smoking, or call 0-528-776-PLAN.     Colds, Flu, Bronchitis  These are common triggers of asthma. Wash your hands often.  Don t touch your eyes, nose or mouth.  Get a flu shot every year.     Dust Mites  These are tiny bugs that live in cloth or carpet. They are too small to see. Wash sheets and blankets in hot water every week.   Encase pillows and mattress in dust mite proof covers.  Avoid having carpet if you can. If you have carpet, vacuum weekly.   Use a dust mask and HEPA vacuum.   Pollen and Outdoor Mold  Some people are allergic to trees, grass, or weed pollen, or molds. Try to keep your windows closed.  Limit time out doors when pollen count is high.   Ask you health care provider about taking medicine during allergy season.     Animal Dander  Some people are allergic to skin flakes, urine or saliva from pets with fur or feathers. Keep pets with fur or feathers out of your home.     If you can t keep the pet outdoors, then keep the pet out of your bedroom.  Keep the bedroom door closed.  Keep pets off cloth furniture and away from stuffed toys.     Mice, Rats, and Cockroaches  Some people are allergic to the waste from these pests.   Cover food and garbage.  Clean up spills and food crumbs.  Store grease in the refrigerator.   Keep food out of the bedroom.   Indoor Mold  This can be a trigger if your home has high moisture. Fix leaking faucets, pipes, or other sources of water.   Clean moldy surfaces.  Dehumidify basement if it is damp and smelly.   Smoke, Strong Odors, and Sprays  These can reduce air quality. Stay away from strong odors and sprays, such as perfume, powder, hair spray, paints, smoke incense, paint, cleaning products, candles and new carpet.   Exercise or Sports  Some people with asthma have this trigger. Be active!  Ask your doctor about taking medicine before sports or exercise to prevent symptoms.    Warm up for 5-10 minutes before and after sports or exercise.     Other Triggers of Asthma  Cold air:  Cover your nose and mouth with a scarf.  Sometimes laughing or crying can be a trigger.  Some medicines and food can trigger asthma.

## 2018-04-30 NOTE — PATIENT INSTRUCTIONS
If you have any questions regarding your allergies, asthma, or what we discussed during your visit today please call the allergy clinic or contact us via [employer].    Gavin Tao/[facility]: [phone]        1. Start using the flonase (fluticasone) nasal spray every day - 1 spray in each nostril twice a day    2. Also use the astelin (azelastine) nasal spray every day - 1 or 2 sprays in each nostril twice a day    3. Continue to take the singulair (montelukast) - 1 tablet at bedtime    4. You can continue to take the zyrtec-D twice daily as needed      Follow-up in 3 months - sooner if needed

## 2018-05-01 ASSESSMENT — ASTHMA QUESTIONNAIRES: ACT_TOTALSCORE: 22

## 2018-05-02 LAB
A ALTERNATA IGE QN: <0.1 KU(A)/L
A FUMIGATUS IGE QN: <0.1 KU(A)/L
C HERBARUM IGE QN: <0.1 KU(A)/L
CAT DANDER IGG QN: <0.1 KU(A)/L
CEDAR IGE QN: <0.1 KU(A)/L
CLAM IGE QN: <0.1 KU(A)/L
COCKSFOOT IGE QN: <0.1 KU(A)/L
CODFISH IGE QN: <0.1 KU(A)/L
COMMON RAGWEED IGE QN: <0.1 KU(A)/L
COTTONWOOD IGE QN: <0.1 KU(A)/L
CRAB IGE QN: <0.1 KU(A)/L
D FARINAE IGE QN: <0.1 KU(A)/L
D PTERONYSS IGE QN: <0.1 KU(A)/L
DOG DANDER+EPITH IGE QN: <0.1 KU(A)/L
E PURPURASCENS IGE QN: <0.1 KU(A)/L
EAST WHITE PINE IGE QN: <0.1 KU(A)/L
ENGL PLANTAIN IGE QN: <0.1 KU(A)/L
FIREBUSH IGE QN: <0.1 KU(A)/L
FLOUNDER IGE QN: <0.1 KU(A)/L
GIANT RAGWEED IGE QN: <0.1 KU(A)/L
GOOSEFOOT IGE QN: <0.1 KU(A)/L
HADDOCK IGE QN: <0.1 KU(A)/L
HALIBUT IGE QN: <0.1 KU(A)/L
JOHNSON GRASS IGE QN: <0.1 KU(A)/L
LOBSTER IGE QN: <0.1 KU(A)/L
MAPLE IGE QN: <0.1 KU(A)/L
MUGWORT IGE QN: <0.1 KU(A)/L
NETTLE IGE QN: <0.1 KU(A)/L
OYSTER IGE QN: <0.1 KU(A)/L
P NOTATUM IGE QN: <0.1 KU(A)/L
RED MULBERRY IGE QN: <0.1 KU(A)/L
SALMON IGE QN: <0.1 KU(A)/L
SALTWORT IGE QN: <0.1 KU(A)/L
SCALLOP IGE QN: <0.1 KU(A)/L
SHEEP SORREL IGE QN: <0.1 KU(A)/L
SHRIMP IGE QN: <0.1 KU(A)/L
SILVER BIRCH IGE QN: <0.1 KU(A)/L
TIMOTHY IGE QN: <0.1 KU(A)/L
TROUT IGE QN: <0.1 KU(A)/L
TUNA IGE QN: <0.1 KU(A)/L
WHITE ASH IGE QN: <0.1 KU(A)/L
WHITE ELM IGE QN: <0.1 KU(A)/L
WHITE MULBERRY IGE QN: <0.1
WHITE OAK IGE QN: <0.1 KU(A)/L
WORMWOOD IGE QN: <0.1 KU(A)/L

## 2018-05-03 LAB
CALIF WALNUT IGE QN: <0.1 KU/L
DEPRECATED MISC ALLERGEN IGE RAST QL: NORMAL

## 2018-05-09 ENCOUNTER — TELEPHONE (OUTPATIENT)
Dept: ALLERGY | Facility: CLINIC | Age: 63
End: 2018-05-09

## 2018-05-09 NOTE — TELEPHONE ENCOUNTER
RN spoke with patient regarding lab results. Patient verbalized understanding. She does not want to do additional testing for shellfish at this point - states she avoids and will continue to avoid.  No further questions or concerns.  Closing encounter.    Luiza Byers RN

## 2018-05-09 NOTE — TELEPHONE ENCOUNTER
Please call patient to discuss her lab results. Testing for all seasonal/environmental allergies (cat, dog, dust mite, trees, grass, weeds, and molds) was negative. She also had negative testing for fish and shellfish. Due to her previous reaction to shrimp I recommend that she return off of antihistamines x 7 days for skin testing and oral challenge (could be done on the same day if scheduled at 8AM and skin testing is negative or small reaction). She should avoid shellfish until she has gone through the oral challenge but may re-introduce regular fish into her diet. Please advise her that due to the negative allergy testing she does not need to take oral antihistamines unless she finds this to be helpful. She should continue with both the flonase and astelin nasal sprays. Follow-up in 1 year unless she would like skin testing and oral challenge to shellfish sooner.

## 2018-05-22 NOTE — PROGRESS NOTES
SUBJECTIVE:   Farzana Hughes is a 62 year old female who presents to clinic today for the following health issues:    ED/UC Followup:    Facility:  [facility]  Date of visit: May 21, 2018  Reason for visit: Kidney stones  Current Status: Improving       Patient presents for ED visit due kidney stonepain.  CT scan showed 2 very small stones (2mm) in the left ureter with left renal hydronephrosis as well as 1 small stone in the left kidney.  She was started on pain medication and flomax to help with symptoms.  Patient denies fever, however has had 1 episode of vomiting and some nausea and has been able to tolerate PO well overall.      Problem list and histories reviewed & adjusted, as indicated.  Additional history: as documented    BP Readings from Last 3 Encounters:   05/24/18 114/70   04/30/18 111/72   04/26/18 120/82    Wt Readings from Last 3 Encounters:   05/24/18 188 lb (85.3 kg)   05/24/18 188 lb (85.3 kg)   04/30/18 184 lb 12.8 oz (83.8 kg)        Reviewed and updated as needed this visit by clinical staff  Tobacco  Allergies  Meds  Problems       Reviewed and updated as needed this visit by Provider  Allergies  Meds  Problems         ROS:  Constitutional, HEENT, cardiovascular, pulmonary, gi and gu systems are negative, except as otherwise noted.    OBJECTIVE:     /70  Pulse 106  Temp 98.8  F (37.1  C) (Oral)  Resp 18  Wt 188 lb (85.3 kg)  SpO2 97%  BMI 35.96 kg/m2  Body mass index is 35.96 kg/(m^2).  GENERAL: healthy, alert and no distress  EYES: Eyes grossly normal to inspection, PERRL and conjunctivae and sclerae normal  NECK: no adenopathy, no asymmetry, masses, or scars and thyroid normal to palpation  RESP: lungs clear to auscultation - no rales, rhonchi or wheezes  CV: regular rate and rhythm, normal S1 S2, no S3 or S4, no murmur, click or rub, no peripheral edema and peripheral pulses strong  MS: no gross musculoskeletal defects noted, no edema  SKIN: no  suspicious lesions or rashes  NEURO: Normal strength and tone, mentation intact and speech normal  PSYCH: mentation appears normal, affect normal/bright    ASSESSMENT/PLAN:     1. Calculus of kidney  Will refill medication, continue flomax, will refer to urology given hydronephrosis  - HYDROcodone-acetaminophen (NORCO) 5-325 MG per tablet; Take 1-2 tablets by mouth every 6 hours as needed for severe pain  Dispense: 20 tablet; Refill: 0  - UROLOGY ADULT REFERRAL    2. Other hydronephrosis  - UROLOGY ADULT REFERRAL    Follow up if symptoms worsen or fail to improve.     Villa Blake MD  [facility]

## 2018-05-24 ENCOUNTER — OFFICE VISIT (OUTPATIENT)
Dept: FAMILY MEDICINE | Facility: CLINIC | Age: 63
End: 2018-05-24
Payer: COMMERCIAL

## 2018-05-24 ENCOUNTER — TELEPHONE (OUTPATIENT)
Dept: FAMILY MEDICINE | Facility: CLINIC | Age: 63
End: 2018-05-24

## 2018-05-24 ENCOUNTER — OFFICE VISIT (OUTPATIENT)
Dept: PODIATRY | Facility: CLINIC | Age: 63
End: 2018-05-24
Payer: COMMERCIAL

## 2018-05-24 VITALS
HEART RATE: 106 BPM | DIASTOLIC BLOOD PRESSURE: 70 MMHG | OXYGEN SATURATION: 97 % | BODY MASS INDEX: 35.96 KG/M2 | TEMPERATURE: 98.8 F | WEIGHT: 188 LBS | RESPIRATION RATE: 18 BRPM | SYSTOLIC BLOOD PRESSURE: 114 MMHG

## 2018-05-24 VITALS — HEART RATE: 88 BPM | WEIGHT: 188 LBS | OXYGEN SATURATION: 96 % | BODY MASS INDEX: 35.96 KG/M2

## 2018-05-24 DIAGNOSIS — M79.675 PAIN OF TOE OF LEFT FOOT: ICD-10-CM

## 2018-05-24 DIAGNOSIS — N13.39 OTHER HYDRONEPHROSIS: ICD-10-CM

## 2018-05-24 DIAGNOSIS — N20.0 CALCULUS OF KIDNEY: Primary | ICD-10-CM

## 2018-05-24 DIAGNOSIS — B35.1 DERMATOPHYTOSIS OF NAIL: ICD-10-CM

## 2018-05-24 DIAGNOSIS — M72.2 PLANTAR FASCIITIS, RIGHT: Primary | ICD-10-CM

## 2018-05-24 PROCEDURE — 99213 OFFICE O/P EST LOW 20 MIN: CPT | Performed by: FAMILY MEDICINE

## 2018-05-24 PROCEDURE — 11720 DEBRIDE NAIL 1-5: CPT | Performed by: PODIATRIST

## 2018-05-24 PROCEDURE — 99214 OFFICE O/P EST MOD 30 MIN: CPT | Mod: 25 | Performed by: PODIATRIST

## 2018-05-24 RX ORDER — HYDROCODONE BITARTRATE AND ACETAMINOPHEN 5; 325 MG/1; MG/1
1-2 TABLET ORAL EVERY 6 HOURS PRN
Qty: 20 TABLET | Refills: 0 | Status: SHIPPED | OUTPATIENT
Start: 2018-05-24 | End: 2020-01-02

## 2018-05-24 RX ORDER — ONDANSETRON 4 MG/1
4 TABLET, ORALLY DISINTEGRATING ORAL
COMMUNITY
Start: 2018-05-21 | End: 2020-09-14

## 2018-05-24 RX ORDER — TAMSULOSIN HYDROCHLORIDE 0.4 MG/1
0.4 CAPSULE ORAL
COMMUNITY
Start: 2018-05-21 | End: 2020-09-14

## 2018-05-24 RX ORDER — HYDROCODONE BITARTRATE AND ACETAMINOPHEN 5; 325 MG/1; MG/1
1-2 TABLET ORAL
COMMUNITY
Start: 2018-05-21 | End: 2018-05-24

## 2018-05-24 ASSESSMENT — PAIN SCALES - GENERAL: PAINLEVEL: NO PAIN (0)
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
## 2018-05-24 NOTE — MR AVS SNAPSHOT
After Visit Summary   5/24/2018    Farzana Hughes    MRN: [id number]           Patient Information     Date Of Birth          1955        Visit Information        Provider Department      5/24/2018 11:40 AM Villa Barakat MD [facility]        Today's Diagnoses     Calculus of kidney    -  1    Visit for screening mammogram        Screening for HIV (human immunodeficiency virus)        Need for prophylactic vaccination against Streptococcus pneumoniae (pneumococcus)        Other hydronephrosis          Care Instructions      Kidney Stone (Urine)  Does this test have other names?  Urine stone risk profile, 24-hour collection  What is this test?  This test checks your urine for chemicals that might cause your body to form kidney stones. The test also looks for blood in your urine, which can be a symptom of kidney stones.  Kidney stones are hard masses of minerals and salts that can form in your kidneys. They can be as small as a grain of sand or more than an inch in diameter. Usually theses stones or crystals pass through your body when you urinate. But sometimes they can get stuck in your urinary tract and cause a lot of pain.  Why do I need this test?  You may need this test if your healthcare provider suspects that you have kidney stones. Symptoms of kidney stones include:    Pain in your lower belly or side    Nausea and vomiting    Sudden, strong urge to urinate    Pain when urinating    Blood in your urine  You may also have this test if you had a kidney stone or you are being treated for kidney stones. If you have had a kidney stone or any treatments for a kidney stone, you should wait 1 to 2 months, or until you have completely recovered, before having this test.  You will need to repeat the test at least twice so your healthcare provider can compare the results.  What other tests might I have along with this test?  Your healthcare provider may also order  imaging tests. These include an ultrasound, CT scan and, a special type of X-ray (pyelogram) that uses a dye to look for kidney stones.  Your provider is also likely to order blood tests, to look for calcium, phosphate, uric acid, oxalate, and citrate. These are some of the chemicals that are most likely to cause your body to form kidney stones.  What do my test results mean?  Test results may vary depending on your age, gender, health history, the method used for the test, and other things. Your test results may not mean you have a problem. Ask your healthcare provider what your test results mean for you.   The results will show whether your urine has high or low levels of the chemicals that are most likely to cause stones to form. These chemicals are calcium, phosphate, uric acid, oxalate, and citrate.  If your levels are not normal, it may mean that you have a kidney stone or stones.  Abnormal levels may also mean that you have another kidney disorder, such as a urinary tract infection.  How is this test done?  This test is done with a 24-hour urine sample. For this sample, you must collect all of your urine for 24 hours. Empty your bladder completely first in the morning without collecting it. Note the time. Then collect your urine every time you go to the bathroom over the next 24 hours.  Does this test pose any risks?  This test does not pose any known risks.  What might affect my test results?  Having this test too soon after treatment for a previous kidney stone can affect your results. You should wait several months after treatment before having this test.  How do I get ready for this test?  You don't need to prepare for this test. Be sure your healthcare provider knows about all medicines, herbs, vitamins, and supplements you are taking. This includes medicines that don't need a prescription and any illicit drugs you may use.     8973-2241 [employer]. [address] [facility], [place]  "[address]. All rights reserved. This information is not intended as a substitute for professional medical care. Always follow your healthcare professional's instructions.         * KIDNEY STONE (w/ Colic)    The sharp cramping pain and nausea/vomiting that you have is due to a small stone which has formed in the kidney and is now passing down a narrow tube (ureter) on its way to your bladder. Once it reaches your bladder, the pain will stop. The stone may pass in your urine stream in one piece. [The size may be 1/16\" to 1/4\" (1-6mm)]. Or, the stone may also break up into carlos fragments which you may not even notice.  Once you have had a kidney stone there is a risk for recurrence in the future.  HOME CARE:      Drink lots of fluid (at least 8-10 glasses of water a day).    Most stones will pass on their own, but may take from a few hours to a few days.    Each time you urinate, do so in a jar. Pour the urine from the jar through the strainer and into the toilet. Continue doing this until 24 hours after your pain stops. By then, if there was a kidney stone, it should pass from your bladder. Some stones dissolve into sand-like particles and pass right through the strainer. In that case, you won't ever see a stone.    Save any stone that you find in the strainer and bring it to your doctor for analysis. It may be possible to prevent certain types of stones from forming. Therefore, it is important to know what kind of stone you have.    Try to stay as active as possible since this will help the stone pass. Do not stay in bed unless your pain prevents you from getting up. You may notice a red, pink or brown color to your urine. This is normal while passing a kidney stone.  FOLLOW UP with your doctor or return to this facility if the pain lasts more than 48 hours.  GET PROMPT MEDICAL ATTENTION if any of the following occur:    Pain that is not controlled by the medicine given    Repeated vomiting or unable to keep down " fluids    Weakness, dizziness or fainting    Fever over 101  F (38.3  C)    Passage of solid red or brown urine (can't see through it) or urine with lots of blood clots    Unable to pass urine for 8 hours and increasing bladder pressure    2316-2420 [employer]. [address] [place] [address]. All rights reserved. This information is not intended as a substitute for professional medical care. Always follow your healthcare professional's instructions.  This information has been modified by your health care provider with permission from the publisher.    [facility]    If you have any questions regarding to your visit please contact your care team:       Team Purple:   Clinic Hours Telephone Number   Dr. Alaina Blake   7am-7pm  Monday - Thursday   7am-5pm  Fridays  [phone]  (Appointment scheduling available 24/7)    Questions about your recent visit?   Team Line:  [phone]   Urgent Care - [place] [facility] - 11am-9pm Monday-Friday Saturday-Sunday- 9am-5pm   [place] - 5pm-9pm Monday-Friday Saturday-Sunday- 9am-5pm  [phone] - Marylou Villegas  [phone] [facility]       What options do I have for a visit other than an office visit? We offer electronic visits (e-visits) and telephone visits, when medically appropriate.  Please check with your medical insurance to see if these types of visits are covered, as you will be responsible for any charges that are not paid by your insurance.      You can use [employer] (secure electronic communication) to access to your chart, send your primary care provider a message, or make an appointment. Ask a team member how to get started.     For a price quote for your services, please call our Consumer Price Line at [phone] or our Imaging Cost estimation line at [phone] (for imaging tests).              Follow-ups after your visit        Additional Services      UROLOGY ADULT REFERRAL       Your provider has referred you to: [facility]: [facility] [phone]   [email or web address]    Please be aware that coverage of these services is subject to the terms and limitations of your health insurance plan.  Call member services at your health plan with any benefit or coverage questions.      Please bring the following with you to your appointment:    (1) Any X-Rays, CTs or MRIs which have been performed.  Contact the facility where they were done to arrange for  prior to your scheduled appointment.    (2) List of current medications  (3) This referral request   (4) Any documents/labs given to you for this referral                  Your next 10 appointments already scheduled     Jul 30, 2018 11:00 AM CDT   [facility]    [address] [facility] [id number] [phone]            Oct 26, 2018  2:00 PM CDT   Office Visit with Villa Barakat MD   [facility]    [address] [address]   [phone]           Bring a current list of meds and any records pertaining to this visit. For Physicals, please bring immunization records and any forms needing to be filled out. Please arrive 10 minutes early to complete paperwork.              Who to contact     If you have questions or need follow up information about today's clinic visit or your schedule please contact [facility] directly at [phone].  Normal or non-critical lab and imaging results will be communicated to you by MyChart, letter or phone within 4 business days after the clinic has received the results. If you do not hear from us within 7 days, please contact the clinic through MyChart or phone. If you have a critical or abnormal lab result, we will notify you by phone as soon  as possible.  Submit refill requests through [employer] or call your pharmacy and they will forward the refill request to us. Please allow 3 business days for your refill to be completed.          Additional Information About Your Visit        [employer] Information     [employer] gives you secure access to your electronic health record. If you see a primary care provider, you can also send messages to your care team and make appointments. If you have questions, please call your primary care clinic.  If you do not have a primary care provider, please call [phone] and they will assist you.        Care EveryWhere ID     This is your Care EveryWhere ID. This could be used by other organizations to access your [facility] medical records  [id number]        Your Vitals Were     Pulse Temperature Respirations Pulse Oximetry BMI (Body Mass Index)       106 98.8  F (37.1  C) (Oral) 18 97% 35.96 kg/m2        Blood Pressure from Last 3 Encounters:   05/24/18 114/70   04/30/18 111/72   04/26/18 120/82    Weight from Last 3 Encounters:   05/24/18 188 lb (85.3 kg)   04/30/18 184 lb 12.8 oz (83.8 kg)   04/26/18 185 lb 12.8 oz (84.3 kg)              We Performed the Following     UROLOGY ADULT REFERRAL          Today's Medication Changes          These changes are accurate as of 5/24/18 12:46 PM.  If you have any questions, ask your nurse or doctor.               These medicines have changed or have updated prescriptions.        Dose/Directions    HYDROcodone-acetaminophen 5-325 MG per tablet   Commonly known as:  NORCO   This may have changed:    - when to take this  - reasons to take this   Used for:  Calculus of kidney   Changed by:  Villa Barakat MD        Dose:  1-2 tablet   Take 1-2 tablets by mouth every 6 hours as needed for severe pain   Quantity:  20 tablet   Refills:  0            Where to get your medicines      Some of these will need a paper prescription and others can be bought over the counter.  Ask  your nurse if you have questions.     Bring a paper prescription for each of these medications     HYDROcodone-acetaminophen 5-325 MG per tablet               Information about OPIOIDS     PRESCRIPTION OPIOIDS: WHAT YOU NEED TO KNOW   You have a prescription for an opioid (narcotic) pain medicine. Opioids can cause addiction. If you have a history of chemical dependency of any type, you are at a higher risk of becoming addicted to opioids. Only take this medicine after all other options have been tried. Take it for as short a time and as few doses as possible.     Do not:    Drive. If you drive while taking these medicines, you could be arrested for driving under the influence (DUI).    Operate heavy machinery    Do any other dangerous activities while taking these medicines.     Drink any alcohol while taking these medicines.      Take with any other medicines that contain acetaminophen. Read all labels carefully. Look for the word  acetaminophen  or  Tylenol.  Ask your pharmacist if you have questions or are unsure.    Store your pills in a secure place, locked if possible. We will not replace any lost or stolen medicine. If you don t finish your medicine, please throw away (dispose) as directed by your pharmacist. The [place] Pollution Control Agency has more information about safe disposal: https://www.pca.[facility].[place].us/living-green/managing-unwanted-medications    All opioids tend to cause constipation. Drink plenty of water and eat foods that have a lot of fiber, such as fruits, vegetables, prune juice, apple juice and high-fiber cereal. Take a laxative (Miralax, milk of magnesia, Colace, Senna) if you don t move your bowels at least every other day.          Primary Care Provider Office Phone # Fax #    DREW Gardner [facility] [phone] [address] [address]        Equal Access to Services     MABLE ALBARADO AH: fallon Jin qaybta  ciarra harrisuriah espinoza. So [facility] [phone].    ATENCIÓN: Si carine du, tiene a coughlin disposición servicios gratuitos de asistencia lingüística. Elder al [phone].    We comply with applicable federal civil rights laws and [place] laws. We do not discriminate on the basis of race, color, national origin, age, disability, sex, sexual orientation, or gender identity.            Thank you!     Thank you for choosing [facility]  for your care. Our goal is always to provide you with excellent care. Hearing back from our patients is one way we can continue to improve our services. Please take a few minutes to complete the written survey that you may receive in the mail after your visit with us. Thank you!             Your Updated Medication List - Protect others around you: Learn how to safely use, store and throw away your medicines at www.disposemymeds.org.          This list is accurate as of 5/24/18 12:46 PM.  Always use your most recent med list.                   Brand Name Dispense Instructions for use Diagnosis    * ACCU-CHEK ELLA Kit     1 kit    1 Device daily.    Family history of diabetes mellitus       * blood glucose monitoring meter device kit    no brand specified    1 kit    Use to test blood sugars two times daily or as directed.    Type 2 diabetes, diet controlled (H)       * blood glucose monitoring meter device kit     1 kit    Use to test blood sugars two times daily or as directed.    Type 2 diabetes, diet controlled (H)       albuterol 108 (90 Base) MCG/ACT Inhaler    PROAIR HFA    1 Inhaler    Inhale 2 puffs into the lungs every 4 hours as needed    Cough, Acute bronchospasm       atorvastatin 40 MG tablet    LIPITOR    90 tablet    Take 1 tablet (40 mg) by mouth daily Generic please    Hyperlipidemia LDL goal <130       Azelastine HCl 137 MCG/SPRAY Soln     1 Bottle    Spray 1-2 sprays in nostril 2 times daily    Other allergic  rhinitis       * blood glucose monitoring test strip    DARLENE CONTOUR    1 Box    Use to test blood sugars two times daily or as directed.    Type 2 diabetes, diet controlled (H)       * blood glucose monitoring test strip    no brand specified    1 Box    180 strips by In Vitro route 2 times daily    Type 2 diabetes, diet controlled (H)       cetirizine-psuedoePHEDrine 5-120 MG per 12 hr tablet    EQL ALL DAY ALLERGY-D    48 tablet    Take 1 tablet by mouth 2 times daily    Moderate persistent asthma, uncomplicated       doxepin 10 MG capsule    SINEquan    90 capsule    Take 1 capsule (10 mg) by mouth At Bedtime    Excoriation, Itching       EPINEPHrine 0.3 MG/0.3ML injection 2-pack    EPIPEN/ADRENACLICK/or ANY BX GENERIC EQUIV    0.6 mL    Inject 0.3 mLs (0.3 mg) into the muscle as needed for anaphylaxis    Adverse food reaction, initial encounter       fluticasone 50 MCG/ACT spray    FLONASE    1 Bottle    Spray 1 spray into both nostrils 2 times daily    Other allergic rhinitis, Allergic conjunctivitis of both eyes       GABAPENTIN PO      Take 100 mg by mouth        Garlic 1000 MG Caps      1 tablet twice daily        HYDROcodone-acetaminophen 5-325 MG per tablet    NORCO    20 tablet    Take 1-2 tablets by mouth every 6 hours as needed for severe pain    Calculus of kidney       levalbuterol 1.25 MG/3ML neb solution    XOPENEX    270 mL    Take 3 mLs (1.25 mg) by nebulization every 4 hours as needed    Seasonal allergies       lisinopril 20 MG tablet    PRINIVIL/ZESTRIL    45 tablet    Take 0.5 tablets (10 mg) by mouth daily    Hypertension goal BP (blood pressure) < 130/80       LORazepam 1 MG tablet    ATIVAN    2 tablet    Take 1 tablet 30 minutes prior to MRI. Take another 1/2-1 tablet just prior to procedure if needed.  Do not operate a vehicle after taking this medication    Cervicalgia, Migraine without aura and without status migrainosus, not intractable, Occipital pain       montelukast 10 MG tablet     SINGULAIR    90 tablet    TAKE ONE TABLET BY MOUTH AT BEDTIME    Moderate persistent asthma, uncomplicated       omeprazole 40 MG capsule    priLOSEC    90 capsule    TAKE 1 CAPSULE (40 MG) BY MOUTH DAILY. TAKE 30 TO 60 MINUTES BEFORE A MEAL    Gastroesophageal reflux disease without esophagitis       ondansetron 4 MG ODT tab    ZOFRAN-ODT     Place 4 mg under the tongue        * ONETOUCH LANCETS Misc     1 Box    90 Devices 2 times daily    Type 2 diabetes, diet controlled (H)       * blood glucose monitoring lancets     1 Box    Use to test blood sugar two times daily or as directed.    Type 2 diabetes, diet controlled (H)       order for DME     1 each    BP cuff, brand as covered by insurance.  Dx: HTN    Benign hypertension       * order for DME     1 Units    Crutches        * order for DME     1 Device    Trilok Ankle Brace    Sprain of right ankle, unspecified ligament, subsequent encounter       * order for DME     1 Units    Equipment being ordered: Hinged knee brace    Arthralgia of right lower leg, Knee injury, right, subsequent encounter       * order for DME     1 Device    CAM walker - short    Extensor tendinitis of foot, Right foot pain       * order for DME     1 Device    Hinged knee brace - medium    Knee injury, right, subsequent encounter, Patellofemoral stress syndrome of right knee, Chondromalacia of patella, right       * order for DME     1 Units    Equipment being ordered: Silicone heel cup    Pain of right heel, Plantar fasciitis, right, Heel spur, right       rizatriptan 5 MG tablet    MAXALT          scopolamine 1.5 MG patch 72 hr    TRANSDERM-SCOP    4 patch    Place onto the skin every 72 hours    Travel sickness, sequela       tamsulosin 0.4 MG capsule    FLOMAX     Take 0.4 mg by mouth        topiramate 25 MG tablet    TOPAMAX    120 tablet    Take 2 tablets (50 mg) by mouth 2 times daily    Migraine without aura and without status migrainosus, not intractable       * Notice:   This list has 13 medication(s) that are the same as other medications prescribed for you. Read the directions carefully, and ask your doctor or other care provider to review them with you.

## 2018-05-24 NOTE — PATIENT INSTRUCTIONS
Kidney Stone (Urine)  Does this test have other names?  Urine stone risk profile, 24-hour collection  What is this test?  This test checks your urine for chemicals that might cause your body to form kidney stones. The test also looks for blood in your urine, which can be a symptom of kidney stones.  Kidney stones are hard masses of minerals and salts that can form in your kidneys. They can be as small as a grain of sand or more than an inch in diameter. Usually theses stones or crystals pass through your body when you urinate. But sometimes they can get stuck in your urinary tract and cause a lot of pain.  Why do I need this test?  You may need this test if your healthcare provider suspects that you have kidney stones. Symptoms of kidney stones include:    Pain in your lower belly or side    Nausea and vomiting    Sudden, strong urge to urinate    Pain when urinating    Blood in your urine  You may also have this test if you had a kidney stone or you are being treated for kidney stones. If you have had a kidney stone or any treatments for a kidney stone, you should wait 1 to 2 months, or until you have completely recovered, before having this test.  You will need to repeat the test at least twice so your healthcare provider can compare the results.  What other tests might I have along with this test?  Your healthcare provider may also order imaging tests. These include an ultrasound, CT scan and, a special type of X-ray (pyelogram) that uses a dye to look for kidney stones.  Your provider is also likely to order blood tests, to look for calcium, phosphate, uric acid, oxalate, and citrate. These are some of the chemicals that are most likely to cause your body to form kidney stones.  What do my test results mean?  Test results may vary depending on your age, gender, health history, the method used for the test, and other things. Your test results may not mean you have a problem. Ask your healthcare provider what  "your test results mean for you.   The results will show whether your urine has high or low levels of the chemicals that are most likely to cause stones to form. These chemicals are calcium, phosphate, uric acid, oxalate, and citrate.  If your levels are not normal, it may mean that you have a kidney stone or stones.  Abnormal levels may also mean that you have another kidney disorder, such as a urinary tract infection.  How is this test done?  This test is done with a 24-hour urine sample. For this sample, you must collect all of your urine for 24 hours. Empty your bladder completely first in the morning without collecting it. Note the time. Then collect your urine every time you go to the bathroom over the next 24 hours.  Does this test pose any risks?  This test does not pose any known risks.  What might affect my test results?  Having this test too soon after treatment for a previous kidney stone can affect your results. You should wait several months after treatment before having this test.  How do I get ready for this test?  You don't need to prepare for this test. Be sure your healthcare provider knows about all medicines, herbs, vitamins, and supplements you are taking. This includes medicines that don't need a prescription and any illicit drugs you may use.     2315-0774 [employer]. [address] [place] [address], [place] [address]. All rights reserved. This information is not intended as a substitute for professional medical care. Always follow your healthcare professional's instructions.         * KIDNEY STONE (w/ Colic)    The sharp cramping pain and nausea/vomiting that you have is due to a small stone which has formed in the kidney and is now passing down a narrow tube (ureter) on its way to your bladder. Once it reaches your bladder, the pain will stop. The stone may pass in your urine stream in one piece. [The size may be 1/16\" to 1/4\" (1-6mm)]. Or, the stone may also break up into carlos " fragments which you may not even notice.  Once you have had a kidney stone there is a risk for recurrence in the future.  HOME CARE:      Drink lots of fluid (at least 8-10 glasses of water a day).    Most stones will pass on their own, but may take from a few hours to a few days.    Each time you urinate, do so in a jar. Pour the urine from the jar through the strainer and into the toilet. Continue doing this until 24 hours after your pain stops. By then, if there was a kidney stone, it should pass from your bladder. Some stones dissolve into sand-like particles and pass right through the strainer. In that case, you won't ever see a stone.    Save any stone that you find in the strainer and bring it to your doctor for analysis. It may be possible to prevent certain types of stones from forming. Therefore, it is important to know what kind of stone you have.    Try to stay as active as possible since this will help the stone pass. Do not stay in bed unless your pain prevents you from getting up. You may notice a red, pink or brown color to your urine. This is normal while passing a kidney stone.  FOLLOW UP with your doctor or return to this facility if the pain lasts more than 48 hours.  GET PROMPT MEDICAL ATTENTION if any of the following occur:    Pain that is not controlled by the medicine given    Repeated vomiting or unable to keep down fluids    Weakness, dizziness or fainting    Fever over 101  F (38.3  C)    Passage of solid red or brown urine (can't see through it) or urine with lots of blood clots    Unable to pass urine for 8 hours and increasing bladder pressure    3319-2049 [employer]. [address] [place] [address], [place] [address]. All rights reserved. This information is not intended as a substitute for professional medical care. Always follow your healthcare professional's instructions.  This information has been modified by your health care provider with permission from the  publisher.    [facility]    If you have any questions regarding to your visit please contact your care team:       Team Purple:   Clinic Hours Telephone Number   Dr. Alaina Blake   7am-7pm  Monday - Thursday   7am-5pm  Fridays  [phone]  (Appointment scheduling available 24/7)    Questions about your recent visit?   Team Line:  [phone]   Urgent Care - [place] and [facility] - 11am-9pm Monday-Friday Saturday-Sunday- 9am-5pm   [place] - 5pm-9pm Monday-Friday Saturday-Sunday- 9am-5pm  [phone] - [place]  [phone] [facility]       What options do I have for a visit other than an office visit? We offer electronic visits (e-visits) and telephone visits, when medically appropriate.  Please check with your medical insurance to see if these types of visits are covered, as you will be responsible for any charges that are not paid by your insurance.      You can use [employer] (secure electronic communication) to access to your chart, send your primary care provider a message, or make an appointment. Ask a team member how to get started.     For a price quote for your services, please call our Consumer Price Line at [phone] or our Imaging Cost estimation line at [phone] (for imaging tests).
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
## 2018-05-24 NOTE — MR AVS SNAPSHOT
After Visit Summary   5/24/2018    Farzana Hughes    MRN: [id number]           Patient Information     Date Of Birth          1955        Visit Information        Provider Department      5/24/2018 2:00 PM Jose Maria Corbett, LENI [facility]        Today's Diagnoses     Plantar fasciitis, right    -  1    Dermatophytosis of nail        Pain of toe of left foot          Care Instructions    We wish you continued good healing. If you have any questions or concerns, please do not hesitate to contact us at [phone]    Please remember to call and schedule a follow up appointment if one was recommended at your earliest convenience.   PODIATRY CLINIC HOURS  TELEPHONE NUMBER    Dr. Jose Maria BLEDSOEPCLAUDIO [facility]    Clinics:  [facility]    Josie Swain [facility]   Tuesday 1PM-6PM  [place]  Wednesday 7AM-2PM  [facility]  Thursday 10AM-6PM  [place]  Friday 7AM-3PM  [place]  Specialty schedulers:   [phone] to make an appointment with any Specialty Provider.        Urgent Care locations:    [facility] Monday-Friday 5 pm - 9 pm. Saturday-Sunday 9 am -5pm    Monday-Friday 11 am - 9 pm Saturday 9 am - 5 pm     Monday-Sunday 12 noon-8PM [phone]     If you need a medication refill, please contact us you may need lab work and/or a follow up visit prior to your refill (i.e. Antifungal medications).    [employer] (secure e-mail communication and access to your chart) to send a message or to make an appointment.    If MRI needed please call Jovan Garrison at [phone]        Weight management plan: Patient was referred to their PCP to discuss a diet and exercise plan.     Patient to follow up with Primary Care provider regarding elevated blood pressure.              Follow-ups after your visit        Your next 10 appointments already scheduled     Jul  30, 2018 11:00 AM CDT   Return Visit with Karthik Hernandez MD   [facility]    [address]  [place] [address] [phone]            Oct 26, 2018  2:00 PM CDT   Office Visit with Villa Barakat MD   [facility] ([facility]    [address]  [place] [address] [phone]           Bring a current list of meds and any records pertaining to this visit. For Physicals, please bring immunization records and any forms needing to be filled out. Please arrive 10 minutes early to complete paperwork.              Who to contact     If you have questions or need follow up information about today's clinic visit or your schedule please contact [facility] directly at [phone].  Normal or non-critical lab and imaging results will be communicated to you by MyChart, letter or phone within 4 business days after the clinic has received the results. If you do not hear from us within 7 days, please contact the clinic through [employer] or phone. If you have a critical or abnormal lab result, we will notify you by phone as soon as possible.  Submit refill requests through [employer] or call your pharmacy and they will forward the refill request to us. Please allow 3 business days for your refill to be completed.          Additional Information About Your Visit        [employer] Information     [employer] gives you secure access to your electronic health record. If you see a primary care provider, you can also send messages to your care team and make appointments. If you have questions, please call your primary care clinic.  If you do not have a primary care provider, please call [phone] and they will assist you.        Care EveryWhere ID     This is your Care EveryWhere ID. This could be used by other organizations to access your [place] medical records  [id number]        Your Vitals Were     Pulse Pulse  Oximetry BMI (Body Mass Index)             88 96% 35.96 kg/m2          Blood Pressure from Last 3 Encounters:   05/24/18 114/70   04/30/18 111/72   04/26/18 120/82    Weight from Last 3 Encounters:   05/24/18 188 lb (85.3 kg)   05/24/18 188 lb (85.3 kg)   04/30/18 184 lb 12.8 oz (83.8 kg)              We Performed the Following     DEBRIDEMENT OF NAIL(S), 1-5          Today's Medication Changes          These changes are accurate as of 5/24/18  4:06 PM.  If you have any questions, ask your nurse or doctor.               These medicines have changed or have updated prescriptions.        Dose/Directions    blood glucose monitoring meter device kit   Commonly known as:  no brand specified   This may have changed:  Another medication with the same name was removed. Continue taking this medication, and follow the directions you see here.   Used for:  Type 2 diabetes, diet controlled (H)   Changed by:  Jose Maria Corbett DPM        Use to test blood sugars two times daily or as directed.   Quantity:  1 kit   Refills:  0       HYDROcodone-acetaminophen 5-325 MG per tablet   Commonly known as:  NORCO   This may have changed:    - when to take this  - reasons to take this   Used for:  Calculus of kidney   Changed by:  Villa Barakat MD        Dose:  1-2 tablet   Take 1-2 tablets by mouth every 6 hours as needed for severe pain   Quantity:  20 tablet   Refills:  0       * order for DME   This may have changed:  Another medication with the same name was removed. Continue taking this medication, and follow the directions you see here.   Used for:  Arthralgia of right lower leg, Knee injury, right, subsequent encounter   Changed by:  Jose Maria Corbett DPM        Equipment being ordered: Hinged knee brace   Quantity:  1 Units   Refills:  0       * order for DME   This may have changed:  Another medication with the same name was removed. Continue taking this medication, and follow the directions you see here.    Used for:  Knee injury, right, subsequent encounter, Patellofemoral stress syndrome of right knee, Chondromalacia of patella, right   Changed by:  Jose Maria Corbett DPM        Hinged knee brace - medium   Quantity:  1 Device   Refills:  0       * order for DME   This may have changed:  Another medication with the same name was removed. Continue taking this medication, and follow the directions you see here.   Used for:  Pain of right heel, Plantar fasciitis, right, Heel spur, right   Changed by:  Jose Maria Corbett DPM        Equipment being ordered: Silicone heel cup   Quantity:  1 Units   Refills:  0       * Notice:  This list has 3 medication(s) that are the same as other medications prescribed for you. Read the directions carefully, and ask your doctor or other care provider to review them with you.         Where to get your medicines      Some of these will need a paper prescription and others can be bought over the counter.  Ask your nurse if you have questions.     Bring a paper prescription for each of these medications     HYDROcodone-acetaminophen 5-325 MG per tablet                Primary Care Provider Office Phone # Fax #    Oly Cannon DREW [facility] [phone] [address] [address]        Equal Access to Services     MABLE ALBARADO : Hadii michelle carrasco hadjacoboo Soyadyali, waaxda luqadaha, qaybta kaalmada adeuriahyada, ciarra espinoza. So [facility] [phone].    ATENCIÓN: Si habla español, tiene a coughlin disposición servicios gratuitos de asistencia lingüística. Elder al [phone].    We comply with applicable federal civil rights laws and [place] laws. We do not discriminate on the basis of race, color, national origin, age, disability, sex, sexual orientation, or gender identity.            Thank you!     Thank you for choosing [facility]  for your care. Our goal is always to provide you with excellent care. Hearing back from our patients  is one way we can continue to improve our services. Please take a few minutes to complete the written survey that you may receive in the mail after your visit with us. Thank you!             Your Updated Medication List - Protect others around you: Learn how to safely use, store and throw away your medicines at www.disposemymeds.org.          This list is accurate as of 5/24/18  4:06 PM.  Always use your most recent med list.                   Brand Name Dispense Instructions for use Diagnosis    albuterol 108 (90 Base) MCG/ACT Inhaler    PROAIR HFA    1 Inhaler    Inhale 2 puffs into the lungs every 4 hours as needed    Cough, Acute bronchospasm       atorvastatin 40 MG tablet    LIPITOR    90 tablet    Take 1 tablet (40 mg) by mouth daily Generic please    Hyperlipidemia LDL goal <130       Azelastine HCl 137 MCG/SPRAY Soln     1 Bottle    Spray 1-2 sprays in nostril 2 times daily    Other allergic rhinitis       blood glucose monitoring meter device kit    no brand specified    1 kit    Use to test blood sugars two times daily or as directed.    Type 2 diabetes, diet controlled (H)       blood glucose monitoring test strip    no brand specified    1 Box    180 strips by In Vitro route 2 times daily    Type 2 diabetes, diet controlled (H)       cetirizine-psuedoePHEDrine 5-120 MG per 12 hr tablet    EQL ALL DAY ALLERGY-D    48 tablet    Take 1 tablet by mouth 2 times daily    Moderate persistent asthma, uncomplicated       doxepin 10 MG capsule    SINEquan    90 capsule    Take 1 capsule (10 mg) by mouth At Bedtime    Excoriation, Itching       EPINEPHrine 0.3 MG/0.3ML injection 2-pack    EPIPEN/ADRENACLICK/or ANY BX GENERIC EQUIV    0.6 mL    Inject 0.3 mLs (0.3 mg) into the muscle as needed for anaphylaxis    Adverse food reaction, initial encounter       fluticasone 50 MCG/ACT spray    FLONASE    1 Bottle    Spray 1 spray into both nostrils 2 times daily    Other allergic rhinitis, Allergic conjunctivitis of  both eyes       GABAPENTIN PO      Take 100 mg by mouth        Garlic 1000 MG Caps      1 tablet twice daily        HYDROcodone-acetaminophen 5-325 MG per tablet    NORCO    20 tablet    Take 1-2 tablets by mouth every 6 hours as needed for severe pain    Calculus of kidney       levalbuterol 1.25 MG/3ML neb solution    XOPENEX    270 mL    Take 3 mLs (1.25 mg) by nebulization every 4 hours as needed    Seasonal allergies       lisinopril 20 MG tablet    PRINIVIL/ZESTRIL    45 tablet    Take 0.5 tablets (10 mg) by mouth daily    Hypertension goal BP (blood pressure) < 130/80       LORazepam 1 MG tablet    ATIVAN    2 tablet    Take 1 tablet 30 minutes prior to MRI. Take another 1/2-1 tablet just prior to procedure if needed.  Do not operate a vehicle after taking this medication    Cervicalgia, Migraine without aura and without status migrainosus, not intractable, Occipital pain       montelukast 10 MG tablet    SINGULAIR    90 tablet    TAKE ONE TABLET BY MOUTH AT BEDTIME    Moderate persistent asthma, uncomplicated       omeprazole 40 MG capsule    priLOSEC    90 capsule    TAKE 1 CAPSULE (40 MG) BY MOUTH DAILY. TAKE 30 TO 60 MINUTES BEFORE A MEAL    Gastroesophageal reflux disease without esophagitis       ondansetron 4 MG ODT tab    ZOFRAN-ODT     Place 4 mg under the tongue        * ONETOUCH LANCETS Misc     1 Box    90 Devices 2 times daily    Type 2 diabetes, diet controlled (H)       * blood glucose monitoring lancets     1 Box    Use to test blood sugar two times daily or as directed.    Type 2 diabetes, diet controlled (H)       order for DME     1 each    BP cuff, brand as covered by insurance.  Dx: HTN    Benign hypertension       * order for DME     1 Units    Equipment being ordered: Hinged knee brace    Arthralgia of right lower leg, Knee injury, right, subsequent encounter       * order for DME     1 Device    Hinged knee brace - medium    Knee injury, right, subsequent encounter, Patellofemoral  stress syndrome of right knee, Chondromalacia of patella, right       * order for DME     1 Units    Equipment being ordered: Silicone heel cup    Pain of right heel, Plantar fasciitis, right, Heel spur, right       rizatriptan 5 MG tablet    MAXALT          scopolamine 1.5 MG patch 72 hr    TRANSDERM-SCOP    4 patch    Place onto the skin every 72 hours    Travel sickness, sequela       tamsulosin 0.4 MG capsule    FLOMAX     Take 0.4 mg by mouth        topiramate 25 MG tablet    TOPAMAX    120 tablet    Take 2 tablets (50 mg) by mouth 2 times daily    Migraine without aura and without status migrainosus, not intractable       * Notice:  This list has 5 medication(s) that are the same as other medications prescribed for you. Read the directions carefully, and ask your doctor or other care provider to review them with you.

## 2018-05-24 NOTE — TELEPHONE ENCOUNTER
Message left for patient to return call. Patient seen for office visit and Norco Prescription left at clinic. Manuela Domingo MA

## 2018-05-25 NOTE — TELEPHONE ENCOUNTER
Patient called back and writer informed of message below. Patient states she is waiting for her car and then will come to clinic to pick it up. Mariajose Guerrier MA

## 2018-06-19 ENCOUNTER — TELEPHONE (OUTPATIENT)
Dept: FAMILY MEDICINE | Facility: CLINIC | Age: 63
End: 2018-06-19

## 2018-06-19 NOTE — TELEPHONE ENCOUNTER
Reason for call:  Patient reporting a symptom    Symptom or request: patient states she drank a probiotic milk yesterday, 5-6 hours after she started having diarrhea and vomiting. Today, patient is no longer vomiting but still has diarrhea. Patient wants to know if it was the probiotic milk that made her feel this way? Patient states that she is lactose intolerant.    Duration (how long have symptoms been present): since yesterday around 630PM    Have you been treated for this before? No    Additional comments: patient asking for a return call to discuss further    Phone Number patient can be reached at:  [phone]    Best Time:  Any time    Can we leave a detailed message on this number:  YES    Call taken on 6/19/2018 at 2:11 PM by Tahmina Sandhu

## 2018-06-19 NOTE — TELEPHONE ENCOUNTER
Spoke with patient she drank milk last night with her probiotic that she normally takes with juice.  Patient had diarrhea and vomiting after and is wondering if it was the probiotic.  Advised patient that if she is lactose intolerant she should not have milk even with the probiotic she can have stomach upset.  Patient will go back to taking probiotic with juice and call back if symptoms persist.   Kathi Crow RN

## 2018-07-03 ENCOUNTER — RADIANT APPOINTMENT (OUTPATIENT)
Dept: GENERAL RADIOLOGY | Facility: CLINIC | Age: 63
End: 2018-07-03
Attending: UROLOGY
Payer: COMMERCIAL

## 2018-07-03 ENCOUNTER — OFFICE VISIT (OUTPATIENT)
Dept: UROLOGY | Facility: CLINIC | Age: 63
End: 2018-07-03
Payer: COMMERCIAL

## 2018-07-03 VITALS — SYSTOLIC BLOOD PRESSURE: 130 MMHG | HEART RATE: 78 BPM | DIASTOLIC BLOOD PRESSURE: 74 MMHG | OXYGEN SATURATION: 96 %

## 2018-07-03 DIAGNOSIS — N20.0 CALCULUS OF KIDNEY: Primary | ICD-10-CM

## 2018-07-03 DIAGNOSIS — N23 RENAL COLIC: ICD-10-CM

## 2018-07-03 LAB
ALBUMIN UR-MCNC: NEGATIVE MG/DL
APPEARANCE UR: CLEAR
BILIRUB UR QL STRIP: NEGATIVE
COLOR UR AUTO: YELLOW
GLUCOSE UR STRIP-MCNC: NEGATIVE MG/DL
HGB UR QL STRIP: NEGATIVE
KETONES UR STRIP-MCNC: NEGATIVE MG/DL
LEUKOCYTE ESTERASE UR QL STRIP: NEGATIVE
NITRATE UR QL: NEGATIVE
PH UR STRIP: 5.5 PH (ref 5–7)
SOURCE: NORMAL
SP GR UR STRIP: 1.02 (ref 1–1.03)
UROBILINOGEN UR STRIP-ACNC: 0.2 EU/DL (ref 0.2–1)

## 2018-07-03 PROCEDURE — 74019 RADEX ABDOMEN 2 VIEWS: CPT

## 2018-07-03 PROCEDURE — 81003 URINALYSIS AUTO W/O SCOPE: CPT | Performed by: UROLOGY

## 2018-07-03 PROCEDURE — 99243 OFF/OP CNSLTJ NEW/EST LOW 30: CPT | Performed by: UROLOGY
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
## 2018-07-03 NOTE — PROGRESS NOTES
Visit Date:   2018      SUBJECTIVE:  The patient is a pleasant 62-year-old  female who was requested to be seen by Dr. Brittany Blake for a consultation with regard to patient's renal colic and ureteral stone.  The patient was seen at [facility] about a month and half ago with a sudden onset of left flank pain.  She had a CT scan that showed a 2 mm stone in the left distal ureter with hydronephrosis.  Since then, patient has been asymptomatic without any pain.  She has no fever, nausea or vomiting.  She has no dysuria or hematuria.      UA today was clear.  KUB, no obvious stone seen.      ASSESSMENT:  Pleasant 62-year-old  female with history of renal colic due to a 2-mm stone in the distal ureter on the left side.  The patient has been symptom-free.  KUB today unremarkable with negative urinalysis.        RECOMMENDATION:  Most likely patient has passed a stone.  She was reassured.  If there are any other problems or concerns, she can come back to see me for reexamination         BRITTANY MILES MD             D: 2018   T: 2018   MT: BETTYE      Name:     PENNIE WHITE   MRN:      [id number]        Account:      [id number]   :      1955           Visit Date:   2018      Document: [id number]       cc: Brittany Blake MD

## 2018-07-03 NOTE — MR AVS SNAPSHOT
After Visit Summary   7/3/2018    Farzana Hughes    MRN: [id number]           Patient Information     Date Of Birth          1955        Visit Information        Provider Department      7/3/2018 1:15 PM Villa Carreon MD [facility]        Today's Diagnoses     Calculus of kidney    -  1    Renal colic           Follow-ups after your visit        Your next 10 appointments already scheduled     Jul 30, 2018 11:00 AM CDT   Return Visit with Karthik Hernandez MD   [facility] ([facility]    [address] [address]   [phone]            Oct 26, 2018  2:00 PM CDT   Office Visit with Villa Barakat MD   [facility] ([facility]    [address] [address]   [phone]           Bring a current list of meds and any records pertaining to this visit. For Physicals, please bring immunization records and any forms needing to be filled out. Please arrive 10 minutes early to complete paperwork.              Who to contact     If you have questions or need follow up information about today's clinic visit or your schedule please contact [facility] directly at [phone].  Normal or non-critical lab and imaging results will be communicated to you by [employer], letter or phone within 4 business days after the clinic has received the results. If you do not hear from us within 7 days, please contact the clinic through [employer] or phone. If you have a critical or abnormal lab result, we will notify you by phone as soon as possible.  Submit refill requests through [employer] or call your pharmacy and they will forward the refill request to us. Please allow 3 business days for your refill to be completed.          Additional Information About Your Visit        [employer] Information     [employer] gives you secure access to your electronic health record. If you see a primary care  provider, you can also send messages to your care team and make appointments. If you have questions, please call your primary care clinic.  If you do not have a primary care provider, please call [phone] and they will assist you.        Care EveryWhere ID     This is your Care EveryWhere ID. This could be used by other organizations to access your [facility] medical records  [id number]        Your Vitals Were     Pulse Pulse Oximetry                78 96%           Blood Pressure from Last 3 Encounters:   07/03/18 130/74   05/24/18 114/70   04/30/18 111/72    Weight from Last 3 Encounters:   05/24/18 85.3 kg (188 lb)   05/24/18 85.3 kg (188 lb)   04/30/18 83.8 kg (184 lb 12.8 oz)              We Performed the Following     UA reflex to Microscopic and Culture     XR SANG        Primary Care Provider Office Phone # Fax #    Oly Winston, APRN [facility] [phone] [address] [address]        Equal Access to Services     MABLE ALBARADO : Hadii aad ku hadasho Soomaali, waaxda luqadaha, qaybta kaalmada adeegyada, waxay idiin haysol weller . So [place] -[phone].    ATENCIÓN: Si habla español, tiene a coughlin disposición servicios gratuitos de asistencia lingüística. Llame al [phone].    We comply with applicable federal civil rights laws and [place] laws. We do not discriminate on the basis of race, color, national origin, age, disability, sex, sexual orientation, or gender identity.            Thank you!     Thank you for choosing [facility]  for your care. Our goal is always to provide you with excellent care. Hearing back from our patients is one way we can continue to improve our services. Please take a few minutes to complete the written survey that you may receive in the mail after your visit with us. Thank you!             Your Updated Medication List - Protect others around you: Learn how to safely use, store and throw away your medicines at  www.disposemymeds.org.          This list is accurate as of 7/3/18  1:56 PM.  Always use your most recent med list.                   Brand Name Dispense Instructions for use Diagnosis    albuterol 108 (90 Base) MCG/ACT Inhaler    PROAIR HFA    1 Inhaler    Inhale 2 puffs into the lungs every 4 hours as needed    Cough, Acute bronchospasm       atorvastatin 40 MG tablet    LIPITOR    90 tablet    Take 1 tablet (40 mg) by mouth daily Generic please    Hyperlipidemia LDL goal <130       Azelastine HCl 137 MCG/SPRAY Soln     1 Bottle    Spray 1-2 sprays in nostril 2 times daily    Other allergic rhinitis       blood glucose monitoring meter device kit    no brand specified    1 kit    Use to test blood sugars two times daily or as directed.    Type 2 diabetes, diet controlled (H)       blood glucose monitoring test strip    no brand specified    1 Box    180 strips by In Vitro route 2 times daily    Type 2 diabetes, diet controlled (H)       cetirizine-pseudoePHEDrine 5-120 MG per 12 hr tablet    EQL ALL DAY ALLERGY-D    48 tablet    Take 1 tablet by mouth 2 times daily    Moderate persistent asthma, uncomplicated       doxepin 10 MG capsule    SINEquan    90 capsule    Take 1 capsule (10 mg) by mouth At Bedtime    Excoriation, Itching       EPINEPHrine 0.3 MG/0.3ML injection 2-pack    EPIPEN/ADRENACLICK/or ANY BX GENERIC EQUIV    0.6 mL    Inject 0.3 mLs (0.3 mg) into the muscle as needed for anaphylaxis    Adverse food reaction, initial encounter       fluticasone 50 MCG/ACT spray    FLONASE    1 Bottle    Spray 1 spray into both nostrils 2 times daily    Other allergic rhinitis, Allergic conjunctivitis of both eyes       GABAPENTIN PO      Take 100 mg by mouth        Garlic 1000 MG Caps      1 tablet twice daily        HYDROcodone-acetaminophen 5-325 MG per tablet    NORCO    20 tablet    Take 1-2 tablets by mouth every 6 hours as needed for severe pain    Calculus of kidney       levalbuterol 1.25 MG/3ML neb  solution    XOPENEX    270 mL    Take 3 mLs (1.25 mg) by nebulization every 4 hours as needed    Seasonal allergies       lisinopril 20 MG tablet    PRINIVIL/ZESTRIL    45 tablet    Take 0.5 tablets (10 mg) by mouth daily    Hypertension goal BP (blood pressure) < 130/80       LORazepam 1 MG tablet    ATIVAN    2 tablet    Take 1 tablet 30 minutes prior to MRI. Take another 1/2-1 tablet just prior to procedure if needed.  Do not operate a vehicle after taking this medication    Cervicalgia, Migraine without aura and without status migrainosus, not intractable, Occipital pain       montelukast 10 MG tablet    SINGULAIR    90 tablet    TAKE ONE TABLET BY MOUTH AT BEDTIME    Moderate persistent asthma, uncomplicated       omeprazole 40 MG capsule    priLOSEC    90 capsule    TAKE 1 CAPSULE (40 MG) BY MOUTH DAILY. TAKE 30 TO 60 MINUTES BEFORE A MEAL    Gastroesophageal reflux disease without esophagitis       ondansetron 4 MG ODT tab    ZOFRAN-ODT     Place 4 mg under the tongue        * ONETOUCH LANCETS Misc     1 Box    90 Devices 2 times daily    Type 2 diabetes, diet controlled (H)       * blood glucose monitoring lancets     1 Box    Use to test blood sugar two times daily or as directed.    Type 2 diabetes, diet controlled (H)       order for DME     1 each    BP cuff, brand as covered by insurance.  Dx: HTN    Benign hypertension       * order for DME     1 Units    Equipment being ordered: Hinged knee brace    Arthralgia of right lower leg, Knee injury, right, subsequent encounter       * order for DME     1 Device    Hinged knee brace - medium    Knee injury, right, subsequent encounter, Patellofemoral stress syndrome of right knee, Chondromalacia of patella, right       * order for DME     1 Units    Equipment being ordered: Silicone heel cup    Pain of right heel, Plantar fasciitis, right, Heel spur, right       rizatriptan 5 MG tablet    MAXALT          scopolamine 1.5 MG patch 72 hr    TRANSDERM-SCOP    4  patch    Place onto the skin every 72 hours    Travel sickness, sequela       tamsulosin 0.4 MG capsule    FLOMAX     Take 0.4 mg by mouth        topiramate 25 MG tablet    TOPAMAX    120 tablet    Take 2 tablets (50 mg) by mouth 2 times daily    Migraine without aura and without status migrainosus, not intractable       * Notice:  This list has 5 medication(s) that are the same as other medications prescribed for you. Read the directions carefully, and ask your doctor or other care provider to review them with you.
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
## 2018-07-26 ENCOUNTER — OFFICE VISIT (OUTPATIENT)
Dept: FAMILY MEDICINE | Facility: CLINIC | Age: 63
End: 2018-07-26

## 2018-07-26 VITALS
BODY MASS INDEX: 35.86 KG/M2 | WEIGHT: 187.5 LBS | TEMPERATURE: 98 F | SYSTOLIC BLOOD PRESSURE: 130 MMHG | OXYGEN SATURATION: 98 % | HEART RATE: 78 BPM | DIASTOLIC BLOOD PRESSURE: 80 MMHG | RESPIRATION RATE: 16 BRPM

## 2018-07-26 DIAGNOSIS — Z12.31 ENCOUNTER FOR SCREENING MAMMOGRAM FOR BREAST CANCER: ICD-10-CM

## 2018-07-26 DIAGNOSIS — S29.012A STRAIN OF RHOMBOID MUSCLE, INITIAL ENCOUNTER: ICD-10-CM

## 2018-07-26 DIAGNOSIS — E66.01 MORBID OBESITY (H): ICD-10-CM

## 2018-07-26 DIAGNOSIS — S16.1XXA STRAIN OF NECK MUSCLE, INITIAL ENCOUNTER: Primary | ICD-10-CM
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
## 2018-07-26 PROCEDURE — 99214 OFFICE O/P EST MOD 30 MIN: CPT | Performed by: FAMILY MEDICINE

## 2018-07-26 RX ORDER — CYCLOBENZAPRINE HCL 5 MG
5 TABLET ORAL 3 TIMES DAILY PRN
Qty: 60 TABLET | Refills: 0 | Status: SHIPPED | OUTPATIENT
Start: 2018-07-26

## 2018-07-26 NOTE — PATIENT INSTRUCTIONS
Understanding Cervical Strain    There are 7 bones (vertebrae) in the neck that are part of the spine. These are called the cervical spine. Cervical strain is a medical term for neck pain. The neck has several layers of muscles. These are connected with tendons to the cervical spine and other bones. Neck pain is often the result of injury to these muscles and tendons.  Causes of cervical strain  Different types of stress on the neck can damage muscles and tendons (soft tissues) and cause cervical strain. Cervical tissues can be damaged by:    The neck being forced past its normal range of motion, such as in a car accident or sports injury    Constant, low-level stress, such as from poor posture or a poorly set-up workspace  Symptoms of cervical strain  These may include:    Neck pain or stiffness    Pain in the shoulders or upper back    Muscle spasms    Headache, often starting at the base of the neck    Irritability, difficulty concentrating, or sleeplessness  Treatment for cervical strain  This problem often gets better on its own. Treatments aim to reduce pain and inflammation and increase the range of motion of the neck. Possible treatments include:    Over-the-counter or prescription pain medicine. These help relieve pain and inflammation.    Stretching exercises to decrease neck stiffness.    Massage to decrease neck stiffness.    Cold or heat pack. These help reduce pain and swelling.  Call 911  Call 911 right away if you have any of these:    Face drooping or numbness    Numbness or weakness, especially in the arms or on one side    Slurred speech or difficulty speaking    Blurred vision   When to call your healthcare provider  Call your healthcare provider right away if you have any of these:    Fever of 100.4 F (38 C) or higher, or as directed    Pain or stiffness that gets worse    Symptoms that don t get better, or get worse    Numbness, tingling, weakness or shooting pains into the arms or  legs    New symptoms  Date Last Reviewed: 3/10/2016    2872-8900 [employer]. [address], [place] [address]. All rights reserved. This information is not intended as a substitute for professional medical care. Always follow your healthcare professional's instructions.        Back Sprain or Strain    Injury to the muscles (strain) or ligaments (sprain) around the spine can be troubling. Injury may occur after a sudden forceful twisting or bending force such as in a car accident, after a simple awkward movement, or after lifting something heavy with poor body positioning. In any case, muscle spasm is often present and adds to the pain.  Thankfully, most people feel better in 1 to 2 weeks, and most of the rest in 1 to 2 months. Most people can remain active. Unless you had a forceful or traumatic physical injury such as a car accident or fall, X-rays may not be ordered for the first evaluation of a back sprain or strain. If pain continues and does not respond to medical treatment, your healthcare provider may then order X-rays and other tests.  Home care  The following guidelines will help you care for your injury at home:    When in bed, try to find a comfortable position. A firm mattress is best. Try lying flat on your back with pillows under your knees. You can also try lying on your side with your knees bent up toward your chest and a pillow between your knees.    Don't sit for long periods. Try not to take long car rides or take other trips that have you sitting for a long time. This puts more stress on the lower back than standing or walking.    During the first 24 to 72 hours after an injury or flare-up, apply an ice pack to the painful area for 20 minutes. Then remove it for 20 minutes. Do this for 60 to 90 minutes, or several times a day. This will reduce swelling and pain. Be sure to wrap the ice pack in a thin towel or plastic to protect your skin.    You can start with ice, then  switch to heat. Heat from a hot shower, hot bath, or heating pad reduces pain and works well for muscle spasms. Put heat on the painful area for 20 minutes, then remove for 20 minutes. Do this for 60 to 90 minutes, or several times a day. Do not use a heating pad while sleeping. It can burn the skin.    You can alternate the ice and heat. Talk with your healthcare provider to find out the best treatment or therapy for your back pain.    Therapeutic massage will help relax the back muscles without stretching them.    Be aware of safe lifting methods. Do not lift anything over 15 pounds until all of the pain is gone.  Medicines  Talk to your healthcare provider before using medicines, especially if you have other health problems or are taking other medicines.    You may use acetaminophen or ibuprofen to control pain, unless another pain medicine was prescribed. If you have chronic conditions like diabetes, liver or kidney disease, stomach ulcers, or gastrointestinal bleeding, or are taking blood-thinner medicines, talk with your doctor before taking any medicines.    Be careful if you are given prescription medicines, narcotics, or medicine for muscle spasm. They can cause drowsiness, and affect your coordination, reflexes, and judgment. Do not drive or operate heavy machinery when taking these types of medicines. Only take pain medicine as prescribed by your healthcare provider.  Follow-up care  Follow up with your healthcare provider, or as advised. You may need physical therapy or more tests if your symptoms get worse.  If you had X-rays your healthcare provider may be checking for any broken bones, breaks, or fractures. Bruises and sprains can sometimes hurt as much as a fracture. These injuries can take time to heal completely. If your symptoms don t improve or they get worse, talk with your healthcare provider. You may need a repeat X-ray or other tests.  Call 911  Call 911 if any of the following  occur:    Trouble breathing    Confused    Very drowsy or trouble awakening    Fainting or loss of consciousness    Rapid or very slow heart rate    Loss of bowel or bladder control  When to seek medical advice  Call your healthcare provider right away if any of the following occur:    Pain gets worse or spreads to your arms or legs    Weakness or numbness in one or both arms or legs    Numbness in the groin or genital area  Date Last Reviewed: 6/1/2016 2000-2017 [employer]. [address] [place] [address]. All rights reserved. This information is not intended as a substitute for professional medical care. Always follow your healthcare professional's instructions.      [facility]    If you have any questions regarding to your visit please contact your care team:       Team Purple:   Clinic Hours Telephone Number   Dr. Alaina Blake   7am-7pm  Monday - Thursday   7am-5pm  Fridays  [phone]  (Appointment scheduling available 24/7)    Questions about your recent visit?   Team Line:  [phone]   Urgent Care - [place] - 11am-9pm Monday-Friday Saturday-Sunday- 9am-5pm   [place] - 5pm-9pm Monday-Friday Saturday-Sunday- 9am-5pm  [phone] - [place]  [phone] [facility]       What options do I have for a visit other than an office visit? We offer electronic visits (e-visits) and telephone visits, when medically appropriate.  Please check with your medical insurance to see if these types of visits are covered, as you will be responsible for any charges that are not paid by your insurance.      You can use [employer] (secure electronic communication) to access to your chart, send your primary care provider a message, or make an appointment. Ask a team member how to get started.     For a price quote for your services, please call our Consumer Price Line at [phone] or our Imaging Cost  estimation line at [phone] (for imaging tests).    Mariajose Guerrier MA
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
## 2018-08-07 NOTE — PROGRESS NOTES
SUBJECTIVE:   Farzana Hughes is a 62 year old female who presents to clinic today for the following health issues:      Chief Complaint   Patient presents with     Recheck Medication     scopolamine (TRANSDERM-SCOP) 1.5 MG patch 72 hr     Health Maintenance     HIV, Eye exam, Pneumo, Mammo, Foot exam, DAP     Needs prescription for motion sickness for traveling    Patient states that she's going on a long road trip to [place] next week and has a history of motion sickness and is wondering if she can get a prescription for scopolomine, which has worked for her in the past.    Diabetes - diet controlled, patient is exercising and has adopted a healthy diet plan    Problem list and histories reviewed & adjusted, as indicated.  Additional history: as documented    BP Readings from Last 3 Encounters:   08/09/18 126/74   07/26/18 130/80   07/03/18 130/74    Wt Readings from Last 3 Encounters:   08/09/18 187 lb 8 oz (85 kg)   07/26/18 187 lb 8 oz (85 kg)   05/24/18 188 lb (85.3 kg)         Reviewed and updated as needed this visit by clinical staff  Tobacco  Allergies  Meds  Problems       Reviewed and updated as needed this visit by Provider  Allergies  Meds  Problems         ROS:  Constitutional, HEENT, cardiovascular, pulmonary, gi and gu systems are negative, except as otherwise noted.    OBJECTIVE:     /74  Pulse 86  Temp 97.4  F (36.3  C) (Oral)  Resp 14  Wt 187 lb 8 oz (85 kg)  SpO2 98%  BMI 35.86 kg/m2  Body mass index is 35.86 kg/(m^2).  GENERAL: healthy, alert and no distress  EYES: Eyes grossly normal to inspection, PERRL and conjunctivae and sclerae normal  HENT: ear canals and TM's normal, nose and mouth without ulcers or lesions  NECK: no adenopathy, no asymmetry, masses, or scars and thyroid normal to palpation  RESP: lungs clear to auscultation - no rales, rhonchi or wheezes  CV: regular rate and rhythm, normal S1 S2, no S3 or S4, no murmur, click or rub, no peripheral edema and  peripheral pulses strong  SKIN: no suspicious lesions or rashes  NEURO: Normal strength and tone, mentation intact and speech normal  PSYCH: mentation appears normal, affect normal/bright    ASSESSMENT/PLAN:     1. Travel sickness, sequela  - scopolamine (TRANSDERM) 72 hr patch; Apply 1 patch to hairless area behind one ear at least 4 hours before travel.  Remove old patch and change every 3 days (72 hours).  Dispense: 10 patch; Refill: 0    2. Type 2 diabetes, diet controlled (H)  Diet controlled  - Hemoglobin A1c    3. Moderate persistent asthma, uncomplicated    4. Visit for screening mammogram    - MA SCREENING DIGITAL BILAT - Future  (s+30); Future    5. Need for prophylactic vaccination against Streptococcus pneumoniae (pneumococcus)      Follow up if symptoms worsen or fail to improve.     Villa Blake MD  [facility]

## 2018-08-09 ENCOUNTER — OFFICE VISIT (OUTPATIENT)
Dept: FAMILY MEDICINE | Facility: CLINIC | Age: 63
End: 2018-08-09
Payer: COMMERCIAL

## 2018-08-09 ENCOUNTER — OFFICE VISIT (OUTPATIENT)
Dept: ALLERGY | Facility: CLINIC | Age: 63
End: 2018-08-09
Payer: COMMERCIAL

## 2018-08-09 VITALS
WEIGHT: 187.5 LBS | HEART RATE: 86 BPM | TEMPERATURE: 97.4 F | BODY MASS INDEX: 35.86 KG/M2 | RESPIRATION RATE: 14 BRPM | OXYGEN SATURATION: 98 % | SYSTOLIC BLOOD PRESSURE: 126 MMHG | DIASTOLIC BLOOD PRESSURE: 74 MMHG

## 2018-08-09 DIAGNOSIS — T75.3XXS: ICD-10-CM

## 2018-08-09 DIAGNOSIS — J45.40 MODERATE PERSISTENT ASTHMA WITHOUT COMPLICATION: Primary | ICD-10-CM

## 2018-08-09 DIAGNOSIS — Z23 NEED FOR PROPHYLACTIC VACCINATION AGAINST STREPTOCOCCUS PNEUMONIAE (PNEUMOCOCCUS): ICD-10-CM

## 2018-08-09 DIAGNOSIS — Z12.31 VISIT FOR SCREENING MAMMOGRAM: ICD-10-CM

## 2018-08-09 DIAGNOSIS — E11.9 TYPE 2 DIABETES, DIET CONTROLLED (H): ICD-10-CM

## 2018-08-09 DIAGNOSIS — J45.40 MODERATE PERSISTENT ASTHMA, UNCOMPLICATED: ICD-10-CM

## 2018-08-09 DIAGNOSIS — J31.0 NONALLERGIC RHINITIS: ICD-10-CM

## 2018-08-09 LAB — HBA1C MFR BLD: 6.4 % (ref 0–5.6)

## 2018-08-09 PROCEDURE — 99207 C FOOT EXAM  NO CHARGE: CPT | Performed by: FAMILY MEDICINE

## 2018-08-09 PROCEDURE — 83036 HEMOGLOBIN GLYCOSYLATED A1C: CPT | Performed by: FAMILY MEDICINE

## 2018-08-09 PROCEDURE — 36415 COLL VENOUS BLD VENIPUNCTURE: CPT | Performed by: FAMILY MEDICINE

## 2018-08-09 PROCEDURE — 99213 OFFICE O/P EST LOW 20 MIN: CPT | Performed by: FAMILY MEDICINE

## 2018-08-09 PROCEDURE — 99213 OFFICE O/P EST LOW 20 MIN: CPT | Performed by: ALLERGY & IMMUNOLOGY

## 2018-08-09 RX ORDER — CETIRIZINE HYDROCHLORIDE, PSEUDOEPHEDRINE HYDROCHLORIDE 5; 120 MG/1; MG/1
1 TABLET, FILM COATED, EXTENDED RELEASE ORAL 2 TIMES DAILY
Qty: 48 TABLET | Refills: 3 | Status: CANCELLED | OUTPATIENT
Start: 2018-08-09

## 2018-08-09 RX ORDER — SCOLOPAMINE TRANSDERMAL SYSTEM 1 MG/1
PATCH, EXTENDED RELEASE TRANSDERMAL
Qty: 10 PATCH | Refills: 0 | Status: SHIPPED | OUTPATIENT
Start: 2018-08-09 | End: 2021-04-15

## 2018-08-09 NOTE — PATIENT INSTRUCTIONS
If you have any questions regarding your allergies, asthma, or what we discussed during your visit today please call the allergy clinic or contact us via Neonode.    Gavin Tao/[facility]: [phone]      Use the flonase (fluticasone) nasal spray in the evening - 2 sprays in each nostril    Try to stop the sudafed    Follow-up in 1 year - sooner if there are any issues
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
## 2018-08-09 NOTE — PATIENT INSTRUCTIONS
[facility]    If you have any questions regarding to your visit please contact your care team:       Team Purple:   Clinic Hours Telephone Number   Dr. Alaina Blake   7am-7pm  Monday - Thursday   7am-5pm  Fridays  [phone]  (Appointment scheduling available 24/7)    Questions about your recent visit?   Team Line:  [phone]   Urgent Care - [place] and [facility] - 11am-9pm Monday-Friday Saturday-Sunday- 9am-5pm   [place] - 5pm-9pm Monday-Friday Saturday-Sunday- 9am-5pm  [phone] - [place]  [phone] - [place]       What options do I have for a visit other than an office visit? We offer electronic visits (e-visits) and telephone visits, when medically appropriate.  Please check with your medical insurance to see if these types of visits are covered, as you will be responsible for any charges that are not paid by your insurance.      You can use [employer] (secure electronic communication) to access to your chart, send your primary care provider a message, or make an appointment. Ask a team member how to get started.     For a price quote for your services, please call our Consumer Price Line at [phone] or our Imaging Cost estimation line at [phone] (for imaging tests).    Mariajose Guerrier MA
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
## 2018-08-09 NOTE — LETTER
[facility]  [address] [place] [address]    August 14, 2018    Farzana Hughes  [address] [place] [address]          Dear Farzana,    Your A1C is stable at 6.4, keep up the good work!     Enclosed is a copy of your results.     Results for orders placed or performed in visit on 08/09/18   Hemoglobin A1c   Result Value Ref Range    Hemoglobin A1C 6.4 (H) 0 - 5.6 %       If you have any questions or concerns, please call myself or my nurse at [phone].      Sincerely,        Villa Barakat MD/dejan
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
## 2018-08-10 ASSESSMENT — ASTHMA QUESTIONNAIRES: ACT_TOTALSCORE: 23

## 2018-08-12 PROBLEM — J31.0 NONALLERGIC RHINITIS: Status: ACTIVE | Noted: 2018-08-12

## 2018-08-17 ENCOUNTER — RADIANT APPOINTMENT (OUTPATIENT)
Dept: MAMMOGRAPHY | Facility: CLINIC | Age: 63
End: 2018-08-17
Attending: FAMILY MEDICINE
Payer: COMMERCIAL

## 2018-08-17 DIAGNOSIS — Z12.31 VISIT FOR SCREENING MAMMOGRAM: ICD-10-CM

## 2018-08-17 PROCEDURE — 77067 SCR MAMMO BI INCL CAD: CPT | Mod: TC

## 2018-08-17 PROCEDURE — 77063 BREAST TOMOSYNTHESIS BI: CPT | Mod: TC

## 2018-08-19 DIAGNOSIS — I10 HYPERTENSION GOAL BP (BLOOD PRESSURE) < 130/80: ICD-10-CM

## 2018-08-20 NOTE — TELEPHONE ENCOUNTER
Routing refill request to provider for review/approval because:  Labs out of range:  See below  Patient has new primary and has been seen recently.   Sarah Kiser RNC

## 2018-08-20 NOTE — TELEPHONE ENCOUNTER
"Requested Prescriptions   Pending Prescriptions Disp Refills     lisinopril (PRINIVIL/ZESTRIL) 20 MG tablet [Pharmacy Med Name: Lisinopril Oral Tablet 20 MG]  Last Written Prescription Date:  08/29/17  Last Fill Quantity: 45,  # refills: 3   Last office visit: 8/9/2018 with prescribing provider:  08/09/18   Future Office Visit:   Next 5 appointments (look out 90 days)     Oct 26, 2018  2:00 PM CDT   Office Visit with Villa Barakat MD   [facility] ([address] [address]   [phone]               45 tablet 2     Sig: TAKE HALF TABLET BY MOUTH DAILY    ACE Inhibitors (Including Combos) Protocol Failed    8/19/2018  7:00 AM       Failed - Normal serum creatinine on file in past 12 months    Recent Labs   Lab Test  03/29/18   1455   CR  1.06*          Failed - Normal serum potassium on file in past 12 months    Recent Labs   Lab Test  04/18/17   1232   POTASSIUM  4.2          Passed - Blood pressure under 140/90 in past 12 months    BP Readings from Last 3 Encounters:   08/09/18 126/74   07/26/18 130/80   07/03/18 130/74          Passed - Recent (12 mo) or future (30 days) visit within the authorizing provider's specialty    Patient had office visit in the last 12 months or has a visit in the next 30 days with authorizing provider or within the authorizing provider's specialty.  See \"Patient Info\" tab in inbasket, or \"Choose Columns\" in Meds & Orders section of the refill encounter.           Passed - Patient is age 18 or older       Passed - No active pregnancy on record       Passed - No positive pregnancy test in past 12 months          "

## 2018-08-21 RX ORDER — LISINOPRIL 20 MG/1
TABLET ORAL
Qty: 45 TABLET | Refills: 2 | Status: SHIPPED | OUTPATIENT
Start: 2018-08-21 | End: 2019-04-30

## 2018-10-10 DIAGNOSIS — K21.9 GASTROESOPHAGEAL REFLUX DISEASE WITHOUT ESOPHAGITIS: ICD-10-CM

## 2018-10-10 NOTE — TELEPHONE ENCOUNTER
"Requested Prescriptions   Pending Prescriptions Disp Refills     omeprazole (PRILOSEC) 40 MG capsule [Pharmacy Med Name: Omeprazole Oral Capsule Delayed Release 40 MG]  Last Written Prescription Date:  08/29/2017  Last Fill Quantity: 90,  # refills: 3   Last office visit: 8/9/2018 with prescribing provider:  clifford   Future Office Visit:   Next 5 appointments (look out 90 days)     Oct 26, 2018  2:00 PM CDT   Office Visit with Villa Barakat MD   [facility] ([place] [id number] [facility]  [place] [address]   [phone]                  90 capsule 2     Sig: TAKE 1 CAPSULE BY MOUTH DAILY. TAKE 30 TO 60 MINUTES BEFORE A MEAL    PPI Protocol Passed    10/10/2018  7:00 AM       Passed - Not on Clopidogrel (unless Pantoprazole ordered)       Passed - No diagnosis of osteoporosis on record       Passed - Recent (12 mo) or future (30 days) visit within the authorizing provider's specialty    Patient had office visit in the last 12 months or has a visit in the next 30 days with authorizing provider or within the authorizing provider's specialty.  See \"Patient Info\" tab in inbasket, or \"Choose Columns\" in Meds & Orders section of the refill encounter.           Passed - Patient is age 18 or older       Passed - No active pregnacy on record       Passed - No positive pregnancy test in past 12 months        Elier Mcgarry RT (R)    "

## 2018-10-11 RX ORDER — OMEPRAZOLE 40 MG/1
CAPSULE, DELAYED RELEASE ORAL
Qty: 90 CAPSULE | Refills: 2 | Status: SHIPPED | OUTPATIENT
Start: 2018-10-11 | End: 2019-04-30

## 2018-10-15 DIAGNOSIS — E78.5 HYPERLIPIDEMIA LDL GOAL <130: ICD-10-CM

## 2018-10-15 RX ORDER — ATORVASTATIN CALCIUM 40 MG/1
40 TABLET, FILM COATED ORAL DAILY
Qty: 90 TABLET | Refills: 0 | Status: SHIPPED | OUTPATIENT
Start: 2018-10-15 | End: 2018-12-21

## 2018-10-15 NOTE — TELEPHONE ENCOUNTER
Requested Prescriptions   Pending Prescriptions Disp Refills     atorvastatin (LIPITOR) 40 MG tablet  Last Written Prescription Date:  3/29/18  Last Fill Quantity: 90,  # refills: 1   Last office visit: 8/9/2018 with prescribing provider:  Roshni   Future Office Visit:   Next 5 appointments (look out 90 days)     Oct 26, 2018  2:00 PM CDT   Office Visit with Villa Barakat MD   [facility]    [address] [facility] [address]   [phone]                  90 tablet 1     Sig: Take 1 tablet (40 mg) by mouth daily Generic please    There is no refill protocol information for this order

## 2018-10-26 NOTE — PROGRESS NOTES
SUBJECTIVE:   Farzana Hughes is a 63 year old female who presents to clinic today for the following health issues:    Chief Complaint   Patient presents with     RECHECK     Kidney     Patient presents to recheck kidney function and A1C  Diabetes is controlled with diet, last A1C was 6.4, highest was 6.5 a few yrs ago, no symptoms related to diabetes at this time      Problem list and histories reviewed & adjusted, as indicated.  Additional history: as documented    BP Readings from Last 3 Encounters:   11/01/18 114/76   08/09/18 126/74   07/26/18 130/80    Wt Readings from Last 3 Encounters:   11/01/18 193 lb (87.5 kg)   08/09/18 187 lb 8 oz (85 kg)   07/26/18 187 lb 8 oz (85 kg)            Reviewed and updated as needed this visit by clinical staff  Tobacco  Allergies  Meds  Problems       Reviewed and updated as needed this visit by Provider  Allergies  Meds  Problems         ROS:  Constitutional, HEENT, cardiovascular, pulmonary, gi and gu systems are negative, except as otherwise noted.    OBJECTIVE:     /76  Pulse 86  Temp 96.4  F (35.8  C) (Oral)  Resp 16  Wt 193 lb (87.5 kg)  SpO2 99%  BMI 36.91 kg/m2  Body mass index is 36.91 kg/(m^2).  GENERAL: healthy, alert and no distress  EYES: Eyes grossly normal to inspection, PERRL and conjunctivae and sclerae normal  RESP: lungs clear to auscultation - no rales, rhonchi or wheezes  CV: regular rate and rhythm, normal S1 S2, no S3 or S4, no murmur, click or rub, no peripheral edema and peripheral pulses strong  SKIN: no suspicious lesions or rashes  PSYCH: mentation appears normal, affect normal/bright    ASSESSMENT/PLAN:     1. Elevated serum creatinine  Will recheck renal panel  - Renal panel (Alb, BUN, Ca, Cl, CO2, Creat, Gluc, Phos, K, Na)  - Albumin Random Urine Quantitative with Creat Ratio    2. Benign essential hypertension  Well controlled    3. Type 2 diabetes, diet controlled (H)  Will recheck A1C, patient has had some weight gain,  discussed healthy diet changes, exercise goals, healthy weight loss goals  - Hemoglobin A1c        Villa Blake MD  [facility]

## 2018-11-01 ENCOUNTER — OFFICE VISIT (OUTPATIENT)
Dept: FAMILY MEDICINE | Facility: CLINIC | Age: 63
End: 2018-11-01
Payer: COMMERCIAL

## 2018-11-01 VITALS
TEMPERATURE: 96.4 F | OXYGEN SATURATION: 99 % | RESPIRATION RATE: 16 BRPM | SYSTOLIC BLOOD PRESSURE: 114 MMHG | WEIGHT: 193 LBS | DIASTOLIC BLOOD PRESSURE: 76 MMHG | BODY MASS INDEX: 36.91 KG/M2 | HEART RATE: 86 BPM

## 2018-11-01 DIAGNOSIS — I10 BENIGN ESSENTIAL HYPERTENSION: ICD-10-CM

## 2018-11-01 DIAGNOSIS — R79.89 ELEVATED SERUM CREATININE: Primary | ICD-10-CM

## 2018-11-01 DIAGNOSIS — E11.9 TYPE 2 DIABETES, DIET CONTROLLED (H): ICD-10-CM

## 2018-11-01 LAB
ALBUMIN SERPL-MCNC: 3.6 G/DL (ref 3.4–5)
ANION GAP SERPL CALCULATED.3IONS-SCNC: 9 MMOL/L (ref 3–14)
BUN SERPL-MCNC: 18 MG/DL (ref 7–30)
CALCIUM SERPL-MCNC: 8.8 MG/DL (ref 8.5–10.1)
CHLORIDE SERPL-SCNC: 107 MMOL/L (ref 94–109)
CO2 SERPL-SCNC: 24 MMOL/L (ref 20–32)
CREAT SERPL-MCNC: 0.86 MG/DL (ref 0.52–1.04)
CREAT UR-MCNC: 77 MG/DL
GFR SERPL CREATININE-BSD FRML MDRD: 67 ML/MIN/1.7M2
GLUCOSE SERPL-MCNC: 128 MG/DL (ref 70–99)
HBA1C MFR BLD: 6.4 % (ref 0–5.6)
MICROALBUMIN UR-MCNC: 5 MG/L
MICROALBUMIN/CREAT UR: 7.1 MG/G CR (ref 0–25)
PHOSPHATE SERPL-MCNC: 2.4 MG/DL (ref 2.5–4.5)
POTASSIUM SERPL-SCNC: 4.1 MMOL/L (ref 3.4–5.3)
SODIUM SERPL-SCNC: 140 MMOL/L (ref 133–144)

## 2018-11-01 PROCEDURE — 99213 OFFICE O/P EST LOW 20 MIN: CPT | Performed by: FAMILY MEDICINE

## 2018-11-01 PROCEDURE — 36415 COLL VENOUS BLD VENIPUNCTURE: CPT | Performed by: FAMILY MEDICINE

## 2018-11-01 PROCEDURE — 80069 RENAL FUNCTION PANEL: CPT | Performed by: FAMILY MEDICINE

## 2018-11-01 PROCEDURE — 83036 HEMOGLOBIN GLYCOSYLATED A1C: CPT | Performed by: FAMILY MEDICINE

## 2018-11-01 PROCEDURE — 82043 UR ALBUMIN QUANTITATIVE: CPT | Performed by: FAMILY MEDICINE
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
## 2018-11-01 NOTE — LETTER
[facility]  [address] [place] [address]    November 2, 2018    Farzana Hughes  [address] [place] [address]          Dear Farzana,    Your kidney function is looking great, so no concerns at this time.  Try to lose weight and stay hydrated.  Let me know if you have any questions    Enclosed is a copy of your results.     Results for orders placed or performed in visit on 11/01/18   Renal panel (Alb, BUN, Ca, Cl, CO2, Creat, Gluc, Phos, K, Na)   Result Value Ref Range    Sodium 140 133 - 144 mmol/L    Potassium 4.1 3.4 - 5.3 mmol/L    Chloride 107 94 - 109 mmol/L    Carbon Dioxide 24 20 - 32 mmol/L    Anion Gap 9 3 - 14 mmol/L    Glucose 128 (H) 70 - 99 mg/dL    Urea Nitrogen 18 7 - 30 mg/dL    Creatinine 0.86 0.52 - 1.04 mg/dL    GFR Estimate 67 >60 mL/min/1.7m2    GFR Estimate If Black 81 >60 mL/min/1.7m2    Calcium 8.8 8.5 - 10.1 mg/dL    Phosphorus 2.4 (L) 2.5 - 4.5 mg/dL    Albumin 3.6 3.4 - 5.0 g/dL   Albumin Random Urine Quantitative with Creat Ratio   Result Value Ref Range    Creatinine Urine 77 mg/dL    Albumin Urine mg/L 5 mg/L    Albumin Urine mg/g Cr 7.10 0 - 25 mg/g Cr   Hemoglobin A1c   Result Value Ref Range    Hemoglobin A1C 6.4 (H) 0 - 5.6 %       If you have any questions or concerns, please call myself or my nurse at [phone].      Sincerely,        Villa Barakat MD/dejan

## 2018-11-01 NOTE — LETTER
[facility]  [address] [place] [address]    November 1, 2018    Far[place]ana Hughes  [address] [place] [address]          Dear Farzana,  Your A1C today is stable in the pre-diabetic range.  Please try your best to watch your carb intake and try to get regular exercise as well.   Enclosed is a copy of your results.     Results for orders placed or performed in visit on 11/01/18   Hemoglobin A1c   Result Value Ref Range    Hemoglobin A1C 6.4 (H) 0 - 5.6 %       If you have any questions or concerns, please call myself or my nurse at [phone].      Sincerely,        Villa Barakat MD/pb

## 2018-11-01 NOTE — PATIENT INSTRUCTIONS
[facility]    If you have any questions regarding to your visit please contact your care team:       Team Purple:   Clinic Hours Telephone Number   Dr. Alaina Blake   7am-7pm  Monday - Thursday   7am-5pm  Fridays  [phone]  (Appointment scheduling available 24/7)   Urgent Care - [place] and [facility] - 11am-9pm Monday-Friday Saturday-Sunday- 9am-5pm   [place] - 5pm-9pm Monday-Friday Saturday-Sunday- 9am-5pm  [phone] - [place]  [phone] - [place]       What options do I have for a visit other than an office visit? We offer electronic visits (e-visits) and telephone visits, when medically appropriate.  Please check with your medical insurance to see if these types of visits are covered, as you will be responsible for any charges that are not paid by your insurance.      You can use EyeEm (secure electronic communication) to access to your chart, send your primary care provider a message, or make an appointment. Ask a team member how to get started.     For a price quote for your services, please call our Consumer Price Line at [phone] or our Imaging Cost estimation line at [phone] (for imaging tests).

## 2018-12-21 DIAGNOSIS — S16.1XXA STRAIN OF NECK MUSCLE, INITIAL ENCOUNTER: ICD-10-CM
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
## 2018-12-21 DIAGNOSIS — E78.5 HYPERLIPIDEMIA LDL GOAL <130: ICD-10-CM

## 2018-12-21 DIAGNOSIS — S29.012A STRAIN OF RHOMBOID MUSCLE, INITIAL ENCOUNTER: ICD-10-CM

## 2018-12-21 RX ORDER — ATORVASTATIN CALCIUM 40 MG/1
40 TABLET, FILM COATED ORAL DAILY
Qty: 90 TABLET | Refills: 0 | Status: SHIPPED | OUTPATIENT
Start: 2018-12-21 | End: 2019-04-30

## 2018-12-21 NOTE — TELEPHONE ENCOUNTER
Requested Prescriptions   Pending Prescriptions Disp Refills     atorvastatin (LIPITOR) 40 MG tablet 90 tablet 0     Sig: Take 1 tablet (40 mg) by mouth daily Generic please    There is no refill protocol information for this order        Last Written Prescription Date:  10/15/2018  Last Fill Quantity: 90,  # refills: 0   Last office visit: 11/1/2018 with prescribing provider:  Macintire   Future Office Visit:

## 2018-12-21 NOTE — TELEPHONE ENCOUNTER
Requested Prescriptions   Pending Prescriptions Disp Refills     cyclobenzaprine (FLEXERIL) 5 MG tablet 60 tablet 0     Sig: Take 1 tablet (5 mg) by mouth 3 times daily as needed for muscle spasms    There is no refill protocol information for this order        Controlled Substance Refill Request for cyclobenzaprine (FLEXERIL) 5 MG tablet  Problem List Complete:  Yes   checked in past 3 months?  No, route to RN

## 2018-12-21 NOTE — TELEPHONE ENCOUNTER
Routing refill request to provider for review/approval because:  Drug not on the [facility] refill protocol     Jimena Ordoñez RN  [facility]

## 2018-12-24 RX ORDER — CYCLOBENZAPRINE HCL 5 MG
5 TABLET ORAL 3 TIMES DAILY PRN
Qty: 60 TABLET | Refills: 0 | OUTPATIENT
Start: 2018-12-24

## 2018-12-26 RX ORDER — CYCLOBENZAPRINE HCL 5 MG
5 TABLET ORAL 3 TIMES DAILY PRN
Qty: 60 TABLET | Refills: 0 | OUTPATIENT
Start: 2018-12-26

## 2018-12-26 NOTE — TELEPHONE ENCOUNTER
Left patient a voicemail to return call back to purple team. Purple team number left on patient's VM regarding below message. Thank you.    BRANT Sharma

## 2018-12-26 NOTE — TELEPHONE ENCOUNTER
Please notify patient of Dr. Blake's message below.  Offer an appointment if patient still having symptoms or wants to discuss further    Trinh Camarillo RN

## 2018-12-26 NOTE — TELEPHONE ENCOUNTER
Patient called back and noted she did not request a refill on this medication. Patient stated she stopped taking the medication as it has helped with the neck and muscle pain. Patient also noted she still has few med left. No refill needed, leif disregard.    BRANT Sharma

## 2019-01-03 ENCOUNTER — OFFICE VISIT (OUTPATIENT)
Dept: FAMILY MEDICINE | Facility: CLINIC | Age: 64
End: 2019-01-03
Payer: COMMERCIAL

## 2019-01-03 VITALS
HEART RATE: 95 BPM | OXYGEN SATURATION: 97 % | SYSTOLIC BLOOD PRESSURE: 114 MMHG | RESPIRATION RATE: 14 BRPM | WEIGHT: 191.5 LBS | DIASTOLIC BLOOD PRESSURE: 74 MMHG | TEMPERATURE: 97.1 F | BODY MASS INDEX: 36.63 KG/M2

## 2019-01-03 DIAGNOSIS — J06.9 VIRAL URI WITH COUGH: Primary | ICD-10-CM

## 2019-01-03 DIAGNOSIS — L98.9 SKIN LESION: ICD-10-CM

## 2019-01-03 DIAGNOSIS — J45.41 MODERATE PERSISTENT ASTHMA WITH EXACERBATION: ICD-10-CM

## 2019-01-03 PROCEDURE — 99214 OFFICE O/P EST MOD 30 MIN: CPT | Performed by: FAMILY MEDICINE

## 2019-01-03 RX ORDER — AZITHROMYCIN 250 MG/1
TABLET, FILM COATED ORAL
Qty: 6 TABLET | Refills: 0 | Status: SHIPPED | OUTPATIENT
Start: 2019-01-03 | End: 2019-01-08

## 2019-01-03 RX ORDER — PREDNISONE 20 MG/1
20 TABLET ORAL DAILY
Qty: 7 TABLET | Refills: 0 | Status: SHIPPED | OUTPATIENT
Start: 2019-01-03 | End: 2019-01-10

## 2019-01-03 NOTE — PROGRESS NOTES
SUBJECTIVE:   Farzana Hughes is a 63 year old female who presents to clinic today for the following health issues:      ENT Symptoms             Symptoms: cc Present Absent Comment   Fever/Chills  x     Fatigue   x    Muscle Aches  x     Eye Irritation  x     Sneezing  x     Nasal Floyd/Drg  x     Sinus Pressure/Pain  x     Loss of smell  x     Dental pain   x    Sore Throat   x    Swollen Glands   x    Ear Pain/Fullness  x     Cough  x     Wheeze   x    Chest Pain  x     Shortness of breath  x     Rash       Other         Symptom duration:  over 1 week   Symptom severity:  moderate   Treatments tried:  tylenol and ibuprofen   Contacts:  uknown       Productive cough for about 1 week with nasal congestion, and ear pain. No fever, taking tylenol and advil, some shortness of breath asthma exacerbation.    Left ear skin lesion that appeared a few months ago and sometimes bleeds, no treatments tried, no pain or itchiness.    Problem list and histories reviewed & adjusted, as indicated.  Additional history: as documented    BP Readings from Last 3 Encounters:   01/03/19 114/74   11/01/18 114/76   08/09/18 126/74    Wt Readings from Last 3 Encounters:   01/03/19 86.9 kg (191 lb 8 oz)   11/01/18 87.5 kg (193 lb)   08/09/18 85 kg (187 lb 8 oz)                    Reviewed and updated as needed this visit by clinical staff  Tobacco  Allergies  Meds  Problems  Med Hx  Surg Hx  Fam Hx       Reviewed and updated as needed this visit by Provider  Tobacco  Allergies  Meds  Problems  Med Hx  Surg Hx  Fam Hx         ROS:  Constitutional, HEENT, cardiovascular, pulmonary, gi and gu systems are negative, except as otherwise noted.    OBJECTIVE:     /74   Pulse 95   Temp 97.1  F (36.2  C) (Oral)   Resp 14   Wt 86.9 kg (191 lb 8 oz)   SpO2 97%   BMI 36.63 kg/m    Body mass index is 36.63 kg/m .  GENERAL: healthy, alert and no distress  EYES: Eyes grossly normal to inspection, PERRL and conjunctivae and sclerae  normal  HENT: ear canals and TM's normal, nose and mouth without ulcers or lesions  NECK: no adenopathy, no asymmetry, masses, or scars and thyroid normal to palpation  RESP: scattered wheeze, no rales  CV: regular rate and rhythm, normal S1 S2, no S3 or S4, no murmur, click or rub, no peripheral edema and peripheral pulses strong  SKIN: large papule left pinna, no suspicious lesions or rashes  PSYCH: mentation appears normal, affect normal/bright    ASSESSMENT/PLAN:     1. Viral URI with cough  Supportive care, will give prednisone and abx, use rescue inhaler every 4-6 hours prn  - predniSONE (DELTASONE) 20 MG tablet; Take 20 mg by mouth daily for 7 days.  Dispense: 7 tablet; Refill: 0    2. Moderate persistent asthma with exacerbation  - predniSONE (DELTASONE) 20 MG tablet; Take 20 mg by mouth daily for 7 days.  Dispense: 7 tablet; Refill: 0  - azithromycin (ZITHROMAX) 250 MG tablet; Take 2 tablets (500 mg) by mouth daily for 1 day, THEN 1 tablet (250 mg) daily for 4 days.  Dispense: 6 tablet; Refill: 0    3. Skin lesion  Will refer to dermatology  - DERMATOLOGY REFERRAL    Follow up if symptoms worsen or fail to improve.     Villa Blake MD  [facility]

## 2019-01-03 NOTE — PATIENT INSTRUCTIONS
Patient Education     Understanding Your Sinuses  Your sinuses are air-filled spaces between the bones in your head. They have small openings that connect to the nasal cavity. The sinuses make mucus that drains into the nose. This helps keep the nose moist and free of dust and germs.      Parts of the nasal cavity    The septum is the wall of cartilage and bone in the center of the nasal cavity.    The middle meatus is the intersection between the sinuses.    Turbinates are ridges on the sides of the nasal cavity.  Cilia keep sinuses clear    Air circulates freely though healthy sinuses. Tiny, hairlike structures called cilia line the sinuses. Cilia move the thin, watery mucus through the sinuses and into the nose. Sinuses are healthy when they drain freely. Sinus drainage can be blocked if the sinus lining is swollen or if mucus is too thick. Cilia that are damaged or don t work correctly can also lead to problems with drainage.  Date Last Reviewed: 10/1/2016    [id number] [employer]. [address] [place] [address]. All rights reserved. This information is not intended as a substitute for professional medical care. Always follow your healthcare professional's instructions.           Patient Education     Sinusitis (Antibiotic Treatment)    The sinuses are air-filled spaces within the bones of the face. They connect to the inside of the nose. Sinusitis is an inflammation of the tissue that lines the sinuses. Sinusitis can occur during a cold. It can also happen due to allergies to pollens and other particles in the air. Sinusitis can cause symptoms of sinus congestion and a feeling of fullness. A sinus infection causes fever, headache, and facial pain. There is often green or yellow fluid draining from the nose or into the back of the throat (post-nasal drip). You have been given antibiotics to treat this condition.  Home care    Take the full course of antibiotics as instructed. Do not  stop taking them, even when you feel better.    Drink plenty of water, hot tea, and other liquids. This may help thin nasal mucus. It also may help your sinuses drain fluids.    Heat may help soothe painful areas of your face. Use a towel soaked in hot water. Or,  the shower and direct the warm spray onto your face. Using a vaporizer along with a menthol rub at night may also help soothe symptoms.     An expectorant with guaifenesin may help thin nasal mucus and help your sinuses drain fluids.    You can use an over-the-counter decongestant, unless a similar medicine was prescribed to you. Nasal sprays work the fastest. Use one that contains phenylephrine or oxymetazoline. First blow your nose gently. Then use the spray. Do not use these medicines more often than directed on the label. If you do, your symptoms may get worse. You may also take pills that contain pseudoephedrine. Don t use products that combine multiple medicines. This is because side effects may be increased. Read labels. You can also ask the pharmacist for help. (People with high blood pressure should not use decongestants. They can raise blood pressure.)    Over-the-counter antihistamines may help if allergies contributed to your sinusitis.      Do not use nasal rinses or irrigation during an acute sinus infection, unless your healthcare provider tells you to. Rinsing may spread the infection to other areas in your sinuses.    Use acetaminophen or ibuprofen to control pain, unless another pain medicine was prescribed to you. If you have chronic liver or kidney disease or ever had a stomach ulcer, talk with your healthcare provider before using these medicines. (Aspirin should never be taken by anyone under age 18 who is ill with a fever. It may cause severe liver damage.)    Don't smoke. This can make symptoms worse.  Follow-up care  Follow up with your healthcare provider or our staff if you are better in 1 week.  When to seek medical  advice  Call your healthcare provider if any of these occur:    Facial pain or headache that gets worse    Stiff neck    Unusual drowsiness or confusion    Swelling of your forehead or eyelids    Vision problems, such as blurred or double vision    Fever of 100.4 F (38 C) or higher, or as directed by your healthcare provider    Seizure    Breathing problems    Symptoms don't go away in 10 days  Prevention  Here are steps you can take to help prevent an infection:    Keep good hand washing habits.    Don t have close contact with people who have sore throats, colds, or other upper respiratory infections.    Don t smoke, and stay away from secondhand smoke.    Stay up to date with of your vaccines.  Date Last Reviewed: 11/1/2017 2000-2018 [employer]. [address] [place] [address], [place] [address]. All rights reserved. This information is not intended as a substitute for professional medical care. Always follow your healthcare professional's instructions.

## 2019-04-11 ENCOUNTER — TELEPHONE (OUTPATIENT)
Dept: FAMILY MEDICINE | Facility: CLINIC | Age: 64
End: 2019-04-11

## 2019-04-11 DIAGNOSIS — J45.40 MODERATE PERSISTENT ASTHMA, UNCOMPLICATED: ICD-10-CM

## 2019-04-11 RX ORDER — MONTELUKAST SODIUM 10 MG/1
TABLET ORAL
Qty: 30 TABLET | Refills: 0 | Status: SHIPPED | OUTPATIENT
Start: 2019-04-11 | End: 2019-04-30

## 2019-04-11 NOTE — TELEPHONE ENCOUNTER
Reason for call:  Med request  Patient called regarding (reason for call): prescription refill of Singulair   Additional comments: Patient ran out of medication and she is currently in [place] and needs refill to the [facility] in [facility] and she thinks the number is [phone]. Please call.    Phone number to reach patient:  Other phone number:  [phone]*    Best Time:  any    Can we leave a detailed message on this number?  YES

## 2019-04-11 NOTE — TELEPHONE ENCOUNTER
Prescription approved per [facility] Refill Protocol- sent 30 day supply, patient due for ACT for further refills.    Kathi Crow RN

## 2019-04-29 NOTE — PROGRESS NOTES
SUBJECTIVE:   Farzana Hughes is a 63 year old female who presents to clinic today for the following   health issues:      Chief Complaint   Patient presents with     Refill Request     Patient presents for medication refills  Seasonal allergies/allergic rhinitis - uses both allergy eye drops and zyrtec with , singulairpseudafed chronically.      Dermatitis - dorsal aspect on right hand, not trying anything for it      Reviewed  and updated as needed this visit by clinical staff  Tobacco  Allergies  Meds  Problems  Med Hx  Surg Hx  Fam Hx         Reviewed and updated as needed this visit by Provider  Tobacco  Allergies  Meds  Problems  Med Hx  Surg Hx  Fam Hx         BP Readings from Last 3 Encounters:   04/30/19 114/76   01/03/19 114/74   11/01/18 114/76    Wt Readings from Last 3 Encounters:   04/30/19 86.2 kg (190 lb)   01/03/19 86.9 kg (191 lb 8 oz)   11/01/18 87.5 kg (193 lb)                    ROS:  Constitutional, HEENT, cardiovascular, pulmonary, gi and gu systems are negative, except as otherwise noted.    OBJECTIVE:     /76   Pulse 96   Temp 97.3  F (36.3  C) (Oral)   Resp 20   Wt 86.2 kg (190 lb)   SpO2 98%   BMI 36.34 kg/m    Body mass index is 36.34 kg/m .  GENERAL: healthy, alert and no distress  EYES: Eyes grossly normal to inspection, PERRL and conjunctivae and sclerae normal  HENT: ear canals and TM's normal, nose and mouth without ulcers or lesions  NECK: no adenopathy, no asymmetry, masses, or scars and thyroid normal to palpation  RESP: lungs clear to auscultation - no rales, rhonchi or wheezes  CV: regular rate and rhythm, normal S1 S2, no S3 or S4, no murmur, click or rub, no peripheral edema and peripheral pulses strong  SKIN: hand dry skin dermatitis  PSYCH: mentation appears normal, affect normal/bright    ASSESSMENT/PLAN:       ICD-10-CM    1. Allergic conjunctivitis, bilateral H10.13 fexofenadine-pseudoePHEDrine (ALLEGRA-D 24) 180-240 MG 24 hr tablet      triamcinolone (NASACORT) 55 MCG/ACT nasal aerosol     olopatadine (PATADAY) 0.2 % ophthalmic solution     ALLERGY/ASTHMA ADULT REFERRAL   2. Hypertension goal BP (blood pressure) < 130/80 I10 lisinopril (PRINIVIL/ZESTRIL) 20 MG tablet   3. Cough R05    4. Acute bronchospasm J98.01    5. Hyperlipidemia LDL goal <130 E78.5 atorvastatin (LIPITOR) 40 MG tablet   6. Excoriation T14.8XXA doxepin (SINEQUAN) 10 MG capsule   7. Itching L29.9 doxepin (SINEQUAN) 10 MG capsule     triamcinolone (KENALOG) 0.1 % external cream   8. Moderate persistent asthma, uncomplicated J45.40 montelukast (SINGULAIR) 10 MG tablet   9. Gastroesophageal reflux disease without esophagitis K21.9 omeprazole (PRILOSEC) 40 MG DR capsule   10. Migraine without aura and without status migrainosus, not intractable G43.009 topiramate (TOPAMAX) 25 MG tablet   11. Seasonal allergic rhinitis, unspecified trigger J30.2 fexofenadine-pseudoePHEDrine (ALLEGRA-D 24) 180-240 MG 24 hr tablet     triamcinolone (NASACORT) 55 MCG/ACT nasal aerosol     olopatadine (PATADAY) 0.2 % ophthalmic solution     ALLERGY/ASTHMA ADULT REFERRAL   12. Atopic dermatitis, unspecified type L20.9 triamcinolone (KENALOG) 0.1 % external cream       Du in 6 months    Villa Blake MD  [facility]

## 2019-04-30 ENCOUNTER — OFFICE VISIT (OUTPATIENT)
Dept: FAMILY MEDICINE | Facility: CLINIC | Age: 64
End: 2019-04-30
Payer: COMMERCIAL

## 2019-04-30 VITALS
DIASTOLIC BLOOD PRESSURE: 76 MMHG | OXYGEN SATURATION: 98 % | BODY MASS INDEX: 36.34 KG/M2 | WEIGHT: 190 LBS | RESPIRATION RATE: 20 BRPM | TEMPERATURE: 97.3 F | HEART RATE: 96 BPM | SYSTOLIC BLOOD PRESSURE: 114 MMHG

## 2019-04-30 DIAGNOSIS — J30.2 SEASONAL ALLERGIC RHINITIS, UNSPECIFIED TRIGGER: ICD-10-CM

## 2019-04-30 DIAGNOSIS — J45.40 MODERATE PERSISTENT ASTHMA, UNCOMPLICATED: ICD-10-CM

## 2019-04-30 DIAGNOSIS — K21.9 GASTROESOPHAGEAL REFLUX DISEASE WITHOUT ESOPHAGITIS: ICD-10-CM

## 2019-04-30 DIAGNOSIS — E78.5 HYPERLIPIDEMIA LDL GOAL <130: ICD-10-CM

## 2019-04-30 DIAGNOSIS — H10.13 ALLERGIC CONJUNCTIVITIS, BILATERAL: Primary | ICD-10-CM

## 2019-04-30 DIAGNOSIS — I10 HYPERTENSION GOAL BP (BLOOD PRESSURE) < 130/80: ICD-10-CM

## 2019-04-30 DIAGNOSIS — R05.9 COUGH: ICD-10-CM

## 2019-04-30 DIAGNOSIS — L20.9 ATOPIC DERMATITIS, UNSPECIFIED TYPE: ICD-10-CM

## 2019-04-30 DIAGNOSIS — G43.009 MIGRAINE WITHOUT AURA AND WITHOUT STATUS MIGRAINOSUS, NOT INTRACTABLE: ICD-10-CM

## 2019-04-30 DIAGNOSIS — L29.9 ITCHING: ICD-10-CM

## 2019-04-30 DIAGNOSIS — J98.01 ACUTE BRONCHOSPASM: ICD-10-CM

## 2019-04-30 DIAGNOSIS — T14.8XXA EXCORIATION: ICD-10-CM

## 2019-04-30 PROCEDURE — 99214 OFFICE O/P EST MOD 30 MIN: CPT | Performed by: FAMILY MEDICINE

## 2019-04-30 RX ORDER — TRIAMCINOLONE ACETONIDE 1 MG/G
CREAM TOPICAL 2 TIMES DAILY
Qty: 80 G | Refills: 0 | Status: SHIPPED | OUTPATIENT
Start: 2019-04-30 | End: 2020-09-14

## 2019-04-30 RX ORDER — DOXEPIN HYDROCHLORIDE 10 MG/1
10 CAPSULE ORAL AT BEDTIME
Qty: 90 CAPSULE | Refills: 3 | Status: SHIPPED | OUTPATIENT
Start: 2019-04-30 | End: 2020-04-16

## 2019-04-30 RX ORDER — TRIAMCINOLONE ACETONIDE 55 UG/1
2 SPRAY, METERED NASAL DAILY
Qty: 1 BOTTLE | Refills: 2 | Status: SHIPPED | OUTPATIENT
Start: 2019-04-30 | End: 2019-08-22

## 2019-04-30 RX ORDER — OMEPRAZOLE 40 MG/1
CAPSULE, DELAYED RELEASE ORAL
Qty: 90 CAPSULE | Refills: 2 | Status: SHIPPED | OUTPATIENT
Start: 2019-04-30 | End: 2020-04-08

## 2019-04-30 RX ORDER — MONTELUKAST SODIUM 10 MG/1
TABLET ORAL
Qty: 30 TABLET | Refills: 2 | Status: SHIPPED | OUTPATIENT
Start: 2019-04-30 | End: 2019-08-13

## 2019-04-30 RX ORDER — OLOPATADINE HYDROCHLORIDE 2 MG/ML
1 SOLUTION/ DROPS OPHTHALMIC DAILY
Qty: 1 BOTTLE | Refills: 3 | Status: SHIPPED | OUTPATIENT
Start: 2019-04-30

## 2019-04-30 RX ORDER — TOPIRAMATE 25 MG/1
50 TABLET, FILM COATED ORAL 2 TIMES DAILY
Qty: 120 TABLET | Refills: 5 | Status: SHIPPED | OUTPATIENT
Start: 2019-04-30 | End: 2020-09-14

## 2019-04-30 RX ORDER — FEXOFENADINE HCL AND PSEUDOEPHEDRINE HCL 180; 240 MG/1; MG/1
1 TABLET, EXTENDED RELEASE ORAL DAILY
Qty: 30 TABLET | Refills: 1 | Status: SHIPPED | OUTPATIENT
Start: 2019-04-30 | End: 2020-09-14

## 2019-04-30 RX ORDER — LISINOPRIL 20 MG/1
TABLET ORAL
Qty: 45 TABLET | Refills: 2 | Status: SHIPPED | OUTPATIENT
Start: 2019-04-30 | End: 2019-12-26

## 2019-04-30 RX ORDER — ATORVASTATIN CALCIUM 40 MG/1
40 TABLET, FILM COATED ORAL DAILY
Qty: 90 TABLET | Refills: 0 | Status: SHIPPED | OUTPATIENT
Start: 2019-04-30 | End: 2019-06-28

## 2019-04-30 NOTE — PATIENT INSTRUCTIONS
Farzana     Here is an outline of the plan:    1. Seasonal allergies - I have sent in prescriptions for allergy eye drops (pataday), a new allergy medication called allegra d once per day.  I have also placed a referral to an allergist in [place].  Please call them to set up the appointment.  Hopefully we can get you off pseudafed at some point.    2. Dermatitis - the rash on your hand is likely due to dry skin.  Continue to moisturize 2x per day and after bathing, apply triamcinolone cream twice per day to minimize itchiness and inflammation.    Let me know if you have any questions.    Villa Blake MD

## 2019-05-06 ENCOUNTER — TELEPHONE (OUTPATIENT)
Dept: FAMILY MEDICINE | Facility: CLINIC | Age: 64
End: 2019-05-06

## 2019-05-06 NOTE — TELEPHONE ENCOUNTER
Patient seen on 04/30/19 missed ACT and AAP. Please complete AAP and route message to team to mail ACT. Manuela Domingo MA

## 2019-05-06 NOTE — LETTER
May 8, 2019          Farznaa Hughes,  [address]  [place] [address]        Dear Farzana Hughes      Monitoring and managing your preventative and chronic health conditions are very important to us. Our records indicate that you have not completed a Asthma Control Test .    If you have received your health care elsewhere, please call the clinic so the information can be documented in your chart.    Please call [phone] or message us through your [employer] account to schedule an appointment or provide information for your chart.     Feel free to contact us if you have any questions or concerns!    I look forward to seeing you and working with you on your health care needs.     Sincerely,       Your [place] Care Team/CC

## 2019-05-06 NOTE — LETTER
May 8, 2019          Farzana Hughes,  [address]  [place] [address]        Dear Farzana Hughes      Monitoring and managing your preventative and chronic health conditions are very important to us. Our records indicate that you have not completed a Asthma Control Test. Please complete the questionnaire and mail it back to the clinic.    If you have received your health care elsewhere, please call the clinic so the information can be documented in your chart.    Please call [phone] or message us through your [employer] account to schedule an appointment or provide information for your chart.     Feel free to contact us if you have any questions or concerns!    I look forward to seeing you and working with you on your health care needs.     Sincerely,       Your [facility] Team

## 2019-05-06 NOTE — LETTER
My Asthma Action Plan  Name: Farzana Hughes   YOB: 1955  Date: 5/6/2019   My doctor: Villa Blake MD   My clinic: [facility]        My Control Medicine: Montelukast (Singulair) -  10 mg .  My Rescue Medicine: Albuterol (Proair/Ventolin/Proventil) inhaler .   My Asthma Severity: intermittent  Avoid your asthma triggers: Patient is unaware of triggers  dust mites  pollens  mold            GREEN ZONE   Good Control    I feel good    No cough or wheeze    Can work, sleep and play without asthma symptoms       Take your asthma control medicine every day.     1. If exercise triggers your asthma, take your rescue medication    15 minutes before exercise or sports, and    During exercise if you have asthma symptoms  2. Spacer to use with inhaler: If you have a spacer, make sure to use it with your inhaler             YELLOW ZONE Getting Worse  I have ANY of these:    I do not feel good    Cough or wheeze    Chest feels tight    Wake up at night   1. Keep taking your Green Zone medications  2. Start taking your rescue medicine:    every 20 minutes for up to 1 hour. Then every 4 hours for 24-48 hours.  3. If you stay in the Yellow Zone for more than 12-24 hours, contact your doctor.  4. If you do not return to the Green Zone in 12-24 hours or you get worse, start taking your oral steroid medicine if prescribed by your provider.           RED ZONE Medical Alert - Get Help  I have ANY of these:    I feel awful    Medicine is not helping    Breathing getting harder    Trouble walking or talking    Nose opens wide to breathe       1. Take your rescue medicine NOW  2. If your provider has prescribed an oral steroid medicine, start taking it NOW  3. Call your doctor NOW  4. If you are still in the Red Zone after 20 minutes and you have not reached your doctor:    Take your rescue medicine again and    Call 911 or go to the emergency room right away    See your regular doctor within 2 weeks of an  Emergency Room or Urgent Care visit for follow-up treatment.          Annual Reminders:  Meet with Asthma Educator,  Flu Shot in the Fall, consider Pneumonia Vaccination for patients with asthma (aged 19 and older).    Pharmacy:    [facility] - [place], [place] - [address] [facility] - [facility] [address] [facility] [address]  [facility] - [facility] [address] [facility]                      Asthma Triggers  How To Control Things That Make Your Asthma Worse    Triggers are things that make your asthma worse.  Look at the list below to help you find your triggers and what you can do about them.  You can help prevent asthma flare-ups by staying away from your triggers.      Trigger                                                          What you can do   Cigarette Smoke  Tobacco smoke can make asthma worse. Do not allow smoking in your home, car or around you.  Be sure no one smokes at a child s day care or school.  If you smoke, ask your health care provider for ways to help you quit.  Ask family members to quit too.  Ask your health care provider for a referral to Quit Plan to help you quit smoking, or call 3-222-654-PLAN.     Colds, Flu, Bronchitis  These are common triggers of asthma. Wash your hands often.  Don t touch your eyes, nose or mouth.  Get a flu shot every year.     Dust Mites  These are tiny bugs that live in cloth or carpet. They are too small to see. Wash sheets and blankets in hot water every week.   Encase pillows and mattress in dust mite proof covers.  Avoid having carpet if you can. If you have carpet, vacuum weekly.   Use a dust mask and HEPA vacuum.   Pollen and Outdoor Mold  Some people are allergic to trees, grass, or weed pollen, or molds. Try to keep your windows closed.  Limit time out doors when pollen count is high.   Ask you health care provider about taking medicine during allergy season.     Animal Dander  Some people are allergic to skin flakes,  urine or saliva from pets with fur or feathers. Keep pets with fur or feathers out of your home.    If you can t keep the pet outdoors, then keep the pet out of your bedroom.  Keep the bedroom door closed.  Keep pets off cloth furniture and away from stuffed toys.     Mice, Rats, and Cockroaches  Some people are allergic to the waste from these pests.   Cover food and garbage.  Clean up spills and food crumbs.  Store grease in the refrigerator.   Keep food out of the bedroom.   Indoor Mold  This can be a trigger if your home has high moisture. Fix leaking faucets, pipes, or other sources of water.   Clean moldy surfaces.  Dehumidify basement if it is damp and smelly.   Smoke, Strong Odors, and Sprays  These can reduce air quality. Stay away from strong odors and sprays, such as perfume, powder, hair spray, paints, smoke incense, paint, cleaning products, candles and new carpet.   Exercise or Sports  Some people with asthma have this trigger. Be active!  Ask your doctor about taking medicine before sports or exercise to prevent symptoms.    Warm up for 5-10 minutes before and after sports or exercise.     Other Triggers of Asthma  Cold air:  Cover your nose and mouth with a scarf.  Sometimes laughing or crying can be a trigger.  Some medicines and food can trigger asthma.

## 2019-05-08 NOTE — TELEPHONE ENCOUNTER
ACT mailed. Please call patient within 10 business days to check if patient has receive ACT questionnaire.

## 2019-06-12 ENCOUNTER — TRANSFERRED RECORDS (OUTPATIENT)
Dept: HEALTH INFORMATION MANAGEMENT | Facility: CLINIC | Age: 64
End: 2019-06-12

## 2019-06-12 LAB
ALT SERPL-CCNC: 26 U/L (ref 6–29)
AST SERPL-CCNC: 21 U/L (ref 10–35)
CREATININE (EXTERNAL): 1.03 MG/DL (ref 0.5–0.99)
GFR ESTIMATED (EXTERNAL): 59 ML/MIN/1.73M2
GFR ESTIMATED (IF AFRICAN AMERICAN) (EXTERNAL): 68 ML/MIN/1.73M2
GLUCOSE (EXTERNAL): 119 MG/DL (ref 65–99)
POTASSIUM (EXTERNAL): 4.6 MMOL/L (ref 3.5–5.3)

## 2019-06-18 ENCOUNTER — TRANSFERRED RECORDS (OUTPATIENT)
Dept: HEALTH INFORMATION MANAGEMENT | Facility: CLINIC | Age: 64
End: 2019-06-18

## 2019-06-18 ENCOUNTER — TELEPHONE (OUTPATIENT)
Dept: FAMILY MEDICINE | Facility: CLINIC | Age: 64
End: 2019-06-18

## 2019-06-27 DIAGNOSIS — E78.5 HYPERLIPIDEMIA LDL GOAL <130: ICD-10-CM

## 2019-06-27 NOTE — TELEPHONE ENCOUNTER
"Pending Prescriptions:                       Disp   Refills    atorvastatin (LIPITOR) 40 MG tablet       90 tab*0            Sig: Take 1 tablet (40 mg) by mouth daily Generic           please    Routing refill request to provider for review/approval because:  Labs not current:  LDL  Labs jean carlos'd up for provider.    Requested Prescriptions   Pending Prescriptions Disp Refills     atorvastatin (LIPITOR) 40 MG tablet 90 tablet 0     Sig: Take 1 tablet (40 mg) by mouth daily Generic please       Statins Protocol Failed - 6/27/2019  2:30 PM        Failed - LDL on file in past 12 months     Recent Labs   Lab Test 03/29/18  1455   LDL 88             Passed - No abnormal creatine kinase in past 12 months     No lab results found.             Passed - Recent (12 mo) or future (30 days) visit within the authorizing provider's specialty     Patient had office visit in the last 12 months or has a visit in the next 30 days with authorizing provider or within the authorizing provider's specialty.  See \"Patient Info\" tab in inbasket, or \"Choose Columns\" in Meds & Orders section of the refill encounter.              Passed - Medication is active on med list        Passed - Patient is age 18 or older        Passed - No active pregnancy on record        Passed - No positive pregnancy test in past 12 months          "

## 2019-06-28 RX ORDER — ATORVASTATIN CALCIUM 40 MG/1
40 TABLET, FILM COATED ORAL DAILY
Qty: 90 TABLET | Refills: 0 | Status: SHIPPED | OUTPATIENT
Start: 2019-06-28 | End: 2020-02-28

## 2019-07-05 ENCOUNTER — ANCILLARY PROCEDURE (OUTPATIENT)
Dept: GENERAL RADIOLOGY | Facility: CLINIC | Age: 64
End: 2019-07-05
Attending: FAMILY MEDICINE
Payer: COMMERCIAL

## 2019-07-05 ENCOUNTER — OFFICE VISIT (OUTPATIENT)
Dept: FAMILY MEDICINE | Facility: CLINIC | Age: 64
End: 2019-07-05
Payer: COMMERCIAL

## 2019-07-05 VITALS
DIASTOLIC BLOOD PRESSURE: 78 MMHG | OXYGEN SATURATION: 98 % | WEIGHT: 191 LBS | BODY MASS INDEX: 36.53 KG/M2 | SYSTOLIC BLOOD PRESSURE: 116 MMHG | RESPIRATION RATE: 20 BRPM | HEART RATE: 81 BPM | TEMPERATURE: 98.4 F

## 2019-07-05 DIAGNOSIS — M25.552 CHRONIC HIP PAIN, BILATERAL: ICD-10-CM

## 2019-07-05 DIAGNOSIS — M25.551 CHRONIC HIP PAIN, BILATERAL: ICD-10-CM

## 2019-07-05 DIAGNOSIS — M25.561 CHRONIC PAIN OF BOTH KNEES: ICD-10-CM

## 2019-07-05 DIAGNOSIS — M25.562 CHRONIC PAIN OF BOTH KNEES: ICD-10-CM

## 2019-07-05 DIAGNOSIS — G89.29 CHRONIC PAIN OF BOTH KNEES: ICD-10-CM

## 2019-07-05 DIAGNOSIS — G89.29 CHRONIC HIP PAIN, BILATERAL: ICD-10-CM

## 2019-07-05 DIAGNOSIS — H61.92 SKIN LESION OF LEFT EAR: ICD-10-CM

## 2019-07-05 DIAGNOSIS — N95.2 VAGINAL ATROPHY: Primary | ICD-10-CM

## 2019-07-05 DIAGNOSIS — N89.8 VAGINAL DISCHARGE: ICD-10-CM

## 2019-07-05 LAB
ALBUMIN UR-MCNC: NEGATIVE MG/DL
APPEARANCE UR: CLEAR
BILIRUB UR QL STRIP: NEGATIVE
COLOR UR AUTO: YELLOW
GLUCOSE UR STRIP-MCNC: NEGATIVE MG/DL
HGB UR QL STRIP: NEGATIVE
KETONES UR STRIP-MCNC: NEGATIVE MG/DL
LEUKOCYTE ESTERASE UR QL STRIP: NEGATIVE
NITRATE UR QL: NEGATIVE
PH UR STRIP: 7 PH (ref 5–7)
SOURCE: NORMAL
SP GR UR STRIP: 1.01 (ref 1–1.03)
SPECIMEN SOURCE: NORMAL
UROBILINOGEN UR STRIP-ACNC: 0.2 EU/DL (ref 0.2–1)
WET PREP SPEC: NORMAL

## 2019-07-05 PROCEDURE — 81003 URINALYSIS AUTO W/O SCOPE: CPT | Performed by: FAMILY MEDICINE

## 2019-07-05 PROCEDURE — 73523 X-RAY EXAM HIPS BI 5/> VIEWS: CPT

## 2019-07-05 PROCEDURE — 99214 OFFICE O/P EST MOD 30 MIN: CPT | Performed by: FAMILY MEDICINE

## 2019-07-05 PROCEDURE — 73560 X-RAY EXAM OF KNEE 1 OR 2: CPT | Mod: RT

## 2019-07-05 PROCEDURE — 73560 X-RAY EXAM OF KNEE 1 OR 2: CPT | Mod: LT

## 2019-07-05 PROCEDURE — 87210 SMEAR WET MOUNT SALINE/INK: CPT | Performed by: FAMILY MEDICINE

## 2019-07-05 RX ORDER — ESTRADIOL 0.1 MG/G
CREAM VAGINAL
Qty: 42.5 G | Refills: 1 | Status: SHIPPED | OUTPATIENT
Start: 2019-07-05 | End: 2021-06-02

## 2019-07-05 ASSESSMENT — ASTHMA QUESTIONNAIRES
QUESTION_3 LAST FOUR WEEKS HOW OFTEN DID YOUR ASTHMA SYMPTOMS (WHEEZING, COUGHING, SHORTNESS OF BREATH, CHEST TIGHTNESS OR PAIN) WAKE YOU UP AT NIGHT OR EARLIER THAN USUAL IN THE MORNING: NOT AT ALL
QUESTION_5 LAST FOUR WEEKS HOW WOULD YOU RATE YOUR ASTHMA CONTROL: WELL CONTROLLED
ACT_TOTALSCORE: 24
QUESTION_4 LAST FOUR WEEKS HOW OFTEN HAVE YOU USED YOUR RESCUE INHALER OR NEBULIZER MEDICATION (SUCH AS ALBUTEROL): NOT AT ALL
ACUTE_EXACERBATION_TODAY: NO
QUESTION_1 LAST FOUR WEEKS HOW MUCH OF THE TIME DID YOUR ASTHMA KEEP YOU FROM GETTING AS MUCH DONE AT WORK, SCHOOL OR AT HOME: NONE OF THE TIME
QUESTION_2 LAST FOUR WEEKS HOW OFTEN HAVE YOU HAD SHORTNESS OF BREATH: NOT AT ALL

## 2019-07-05 NOTE — PROGRESS NOTES
Subjective     Farznaa Hughes is a 63 year old female who presents to clinic today for the following health issues:    HPI   Vaginal Symptoms      Duration: Ongoing    Description  itching, burning and pain with intercourse    Intensity:  moderate    Accompanying signs and symptoms (fever/dysuria/abdominal or back pain): None    History  Sexually active: yes, single partner, contraception not required  Possibility of pregnancy: No  Recent antibiotic use: no     Precipitating or alleviating factors: None    Therapies tried and outcome: Monistat and Vagisil   Outcome: no relief    Patient presents for vaginal/urinary symptoms.  Denies burning with urination, urgency, incomplete emptying, however has had some frequency, with vaginal dryness.  She has experienced pain during intercourse as well, vaginal itching, which she attributes to dryness.  No vaginal discharge or bleeding.  Of note, patient has a history of diabetes that's well controlled.    Chronic knee pain - cracking sound, pain when walking up stairs, no injury event, has had some mild-intermittent swelling at times after walking long distances, taking tylenol for pain which has helped a little    Chronic bilateral hip pain - when walking, located at lateral aspect of both hips, tylenol for treatment    Skin lesion left ear - skin colored, no change in size or quality, requesting derm refill    BP Readings from Last 3 Encounters:   07/11/19 116/78   07/05/19 116/78   04/30/19 114/76    Wt Readings from Last 3 Encounters:   07/11/19 86.6 kg (191 lb)   07/05/19 86.6 kg (191 lb)   04/30/19 86.2 kg (190 lb)        Reviewed and updated as needed this visit by Provider  Tobacco  Allergies  Meds  Problems  Med Hx  Surg Hx  Fam Hx         Review of Systems   ROS COMP: Constitutional, HEENT, cardiovascular, pulmonary, gi and gu systems are negative, except as otherwise noted.      Objective    /78   Pulse 81   Temp 98.4  F (36.9  C) (Oral)   Resp 20    Wt 86.6 kg (191 lb)   SpO2 98%   BMI 36.53 kg/m    Body mass index is 36.53 kg/m .  Physical Exam   GENERAL: healthy, alert and no distress  EYES: Eyes grossly normal to inspection, PERRL and conjunctivae and sclerae normal  NECK: no adenopathy, no asymmetry, masses, or scars and thyroid normal to palpation  RESP: lungs clear to auscultation - no rales, rhonchi or wheezes  CV: regular rate and rhythm, normal S1 S2, no S3 or S4, no murmur, click or rub, no peripheral edema and peripheral pulses strong  ABDOMEN: soft, nontender, no hepatosplenomegaly, no masses and bowel sounds normal  MS - slight left knee effusion, some joint line tenderness, (-)korin sign, no joint laxity, no pain with passive hip rotation however there is some lateral hip tenderness bilaterally.  SKIN: no suspicious lesions or rashes  PSYCH: mentation appears normal, affect normal/bright          Assessment & Plan       ICD-10-CM    1. Vaginal atrophy N95.2 estradiol (ESTRACE) 0.1 MG/GM vaginal cream   2. Vaginal discharge N89.8 UA reflex to Microscopic and Culture     Wet prep   3. Chronic hip pain, bilateral M25.551 order for DME    M25.552 XR Pelvis and Hip Bilateral 2 Views    G89.29 CANCELED: XR Knee Right 3 Views     CANCELED: XR Knee Left 3 Views   4. Chronic pain of both knees M25.561 order for DME    M25.562 XR Pelvis and Hip Bilateral 2 Views    G89.29 CANCELED: XR Knee Right 3 Views     CANCELED: XR Knee Left 3 Views   5. Skin lesion of left ear L98.9 DERMATOLOGY REFERRAL     Vaginal atrophy - likely cause of dryness is post-menopausal state, will give trial of estradiol cream.  Follow-up in 1 month for this issue.  UA and wet prep were normal.  Knee pain - suspect arthritic changes, will obtain Xr's to assess joint spaces  Hip pain - suspect trochanteric bursitis  Skin lesion - suspect seb k, which was discussed, will refer to dermatology for assessment.   A total of 30 minutes spent face-to-face with greater than 50% of the time  spent in counseling and coordinating cares of the issues above   Follow-up in 3 months  Villa Blake MD  [facility]

## 2019-07-06 ASSESSMENT — ASTHMA QUESTIONNAIRES: ACT_TOTALSCORE: 24

## 2019-07-11 ENCOUNTER — OFFICE VISIT (OUTPATIENT)
Dept: ALLERGY | Facility: CLINIC | Age: 64
End: 2019-07-11
Payer: COMMERCIAL

## 2019-07-11 VITALS
WEIGHT: 191 LBS | TEMPERATURE: 98.4 F | DIASTOLIC BLOOD PRESSURE: 78 MMHG | RESPIRATION RATE: 18 BRPM | BODY MASS INDEX: 37.5 KG/M2 | SYSTOLIC BLOOD PRESSURE: 116 MMHG | HEART RATE: 81 BPM | HEIGHT: 60 IN

## 2019-07-11 DIAGNOSIS — J34.89 SINUS PRESSURE: ICD-10-CM

## 2019-07-11 DIAGNOSIS — J98.01 ACUTE BRONCHOSPASM: ICD-10-CM

## 2019-07-11 DIAGNOSIS — Z91.013 SHELLFISH ALLERGY: ICD-10-CM

## 2019-07-11 DIAGNOSIS — R05.9 COUGH: ICD-10-CM

## 2019-07-11 DIAGNOSIS — J31.0 NON-ALLERGIC RHINITIS: ICD-10-CM

## 2019-07-11 DIAGNOSIS — J45.40 MODERATE PERSISTENT ASTHMA WITHOUT COMPLICATION: Primary | ICD-10-CM

## 2019-07-11 LAB
FEF 25/75: NORMAL
FEV-1: NORMAL
FEV1/FVC: NORMAL
FVC: NORMAL

## 2019-07-11 PROCEDURE — 99214 OFFICE O/P EST MOD 30 MIN: CPT | Mod: 25 | Performed by: ALLERGY & IMMUNOLOGY

## 2019-07-11 PROCEDURE — 86003 ALLG SPEC IGE CRUDE XTRC EA: CPT | Performed by: ALLERGY & IMMUNOLOGY

## 2019-07-11 PROCEDURE — 36415 COLL VENOUS BLD VENIPUNCTURE: CPT | Performed by: ALLERGY & IMMUNOLOGY

## 2019-07-11 PROCEDURE — 94010 BREATHING CAPACITY TEST: CPT | Performed by: ALLERGY & IMMUNOLOGY

## 2019-07-11 RX ORDER — IPRATROPIUM BROMIDE 42 UG/1
2 SPRAY, METERED NASAL 4 TIMES DAILY
Qty: 1 BOX | Refills: 11 | Status: SHIPPED | OUTPATIENT
Start: 2019-07-11 | End: 2020-09-11

## 2019-07-11 RX ORDER — EPINEPHRINE 0.3 MG/.3ML
0.3 INJECTION SUBCUTANEOUS PRN
Qty: 2 EACH | Refills: 3 | Status: SHIPPED | OUTPATIENT
Start: 2019-07-11 | End: 2020-08-27

## 2019-07-11 RX ORDER — ALBUTEROL SULFATE 90 UG/1
2 AEROSOL, METERED RESPIRATORY (INHALATION) EVERY 4 HOURS PRN
Qty: 1 INHALER | Refills: 3 | Status: SHIPPED | OUTPATIENT
Start: 2019-07-11 | End: 2019-08-22

## 2019-07-11 ASSESSMENT — MIFFLIN-ST. JEOR: SCORE: 1352.87

## 2019-07-11 ASSESSMENT — PAIN SCALES - GENERAL: PAINLEVEL: MODERATE PAIN (4)

## 2019-07-11 NOTE — LETTER
My Asthma Action Plan  Name: Farzana Hughes   YOB: 1955  Date: 7/11/2019   My doctor: Murali Logan, DO   My clinic: [facility]        My Control Medicine: Montelukast (Singulair) -  10 mg daily  My Rescue Medicine:   Albuterol 2-4 puffs inhaled (use a spacer unless using a Proair Respiclick device) every 4 hours as needed for chest tightness, wheezing, shortness of breath and/or coughing.      My Asthma Severity: mild persistent  Avoid your asthma triggers:   dust mites  pollens  mold            GREEN ZONE   Good Control    I feel good    No cough or wheeze    Can work, sleep and play without asthma symptoms       Take your asthma control medicine every day.     1. If exercise triggers your asthma, take your rescue medication    15 minutes before exercise or sports, and    During exercise if you have asthma symptoms  2. Spacer to use with inhaler: If you have a spacer, make sure to use it with your inhaler             YELLOW ZONE Getting Worse  I have ANY of these:    I do not feel good    Cough or wheeze    Chest feels tight    Wake up at night   1. Keep taking your Green Zone medications  2. Start taking your rescue medicine:    every 20 minutes for up to 1 hour. Then every 4 hours for 24-48 hours.  3. If you stay in the Yellow Zone for more than 12-24 hours, contact your doctor.  4. If you do not return to the Green Zone in 12-24 hours or you get worse, start taking your oral steroid medicine if prescribed by your provider.           RED ZONE Medical Alert - Get Help  I have ANY of these:    I feel awful    Medicine is not helping    Breathing getting harder    Trouble walking or talking    Nose opens wide to breathe       1. Take your rescue medicine NOW  2. If your provider has prescribed an oral steroid medicine, start taking it NOW  3. Call your doctor NOW  4. If you are still in the Red Zone after 20 minutes and you have not reached your doctor:    Take your rescue medicine  again and    Call 911 or go to the emergency room right away    See your regular doctor within 2 weeks of an Emergency Room or Urgent Care visit for follow-up treatment.          Annual Reminders:  Meet with Asthma Educator,  Flu Shot in the Fall, consider Pneumonia Vaccination for patients with asthma (aged 19 and older).    Pharmacy:    [id number] - [place], [place] - [address] [facility] [address] [facility] [address] [facility] [address]  [facility] [address] [facility] [address] [facility]                      Asthma Triggers  How To Control Things That Make Your Asthma Worse    Triggers are things that make your asthma worse.  Look at the list below to help you find your triggers and what you can do about them.  You can help prevent asthma flare-ups by staying away from your triggers.      Trigger                                                          What you can do   Cigarette Smoke  Tobacco smoke can make asthma worse. Do not allow smoking in your home, car or around you.  Be sure no one smokes at a child s day care or school.  If you smoke, ask your health care provider for ways to help you quit.  Ask family members to quit too.  Ask your health care provider for a referral to Quit Plan to help you quit smoking, or call 7-596-504-PLAN.     Colds, Flu, Bronchitis  These are common triggers of asthma. Wash your hands often.  Don t touch your eyes, nose or mouth.  Get a flu shot every year.     Dust Mites  These are tiny bugs that live in cloth or carpet. They are too small to see. Wash sheets and blankets in hot water every week.   Encase pillows and mattress in dust mite proof covers.  Avoid having carpet if you can. If you have carpet, vacuum weekly.   Use a dust mask and HEPA vacuum.   Pollen and Outdoor Mold  Some people are allergic to trees, grass, or weed pollen, or molds. Try to keep your windows closed.  Limit time out doors when pollen count is high.   Ask you health care provider  about taking medicine during allergy season.     Animal Dander  Some people are allergic to skin flakes, urine or saliva from pets with fur or feathers. Keep pets with fur or feathers out of your home.    If you can t keep the pet outdoors, then keep the pet out of your bedroom.  Keep the bedroom door closed.  Keep pets off cloth furniture and away from stuffed toys.     Mice, Rats, and Cockroaches  Some people are allergic to the waste from these pests.   Cover food and garbage.  Clean up spills and food crumbs.  Store grease in the refrigerator.   Keep food out of the bedroom.   Indoor Mold  This can be a trigger if your home has high moisture. Fix leaking faucets, pipes, or other sources of water.   Clean moldy surfaces.  Dehumidify basement if it is damp and smelly.   Smoke, Strong Odors, and Sprays  These can reduce air quality. Stay away from strong odors and sprays, such as perfume, powder, hair spray, paints, smoke incense, paint, cleaning products, candles and new carpet.   Exercise or Sports  Some people with asthma have this trigger. Be active!  Ask your doctor about taking medicine before sports or exercise to prevent symptoms.    Warm up for 5-10 minutes before and after sports or exercise.     Other Triggers of Asthma  Cold air:  Cover your nose and mouth with a scarf.  Sometimes laughing or crying can be a trigger.  Some medicines and food can trigger asthma.
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
## 2019-07-11 NOTE — LETTER
[facility]                   FOOD ALLERGY & ANAPHYLAXIS EMERGENCY CARE PLAN  Food Allergy Research & Education         Name: Farzana BLEDSOEO.B.:  [id number]    Allergy to: Shellfish  Weight: 191 lbs 0 oz lbs.  Asthma:  Yes  (higher risk for a severe reaction)    -NOTE: Do not depend on antihistamines or inhalers (bronchodilators) to treat a severe reaction. USE EPINEPHRINE.     MEDICATIONS/DOSES  Epinephrine Brand: EpiPen  Epinephrine Dose: 0.3 mg IM  Antihistamine Brand or Generic: Zyrtec (Cetirizine)  Antihistamine Dose: 10mg  Other (e.g., inhaler-bronchodilator if wheezing): albuterol       FARE                   FOOD ALLERGY & ANAPHYLAXIS EMERGENCY CARE PLAN   Food Allergy Research [employer] Education           EMERGENCY CONTACTS - CALL 911  DOCTOR:  Murali Logan DO   PHONE: [phone]  PARENT/GUARDIAN:              PHONE:  OTHER EMERGENCY CONTACTS  NAME/RELATIONSHIP:   PHONE:   NAME/RELATIONSHIP:    PHONE:           PARENT/GUARDIAN AUTHORIZATION SIGNATURE     DATE              PHYSICIAN/H CP AUTHORIZATION SIGNATURE         DATE  FORM PROVIDED [employer] OF FOOD ALLERGY RESE[employer] & EDUCATION (FARE) [email or web address]) 2014
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
## 2019-07-11 NOTE — ASSESSMENT & PLAN NOTE
History of asthma.  Asthma has been well controlled.  Asthma flares with chemical/perfume exposure.  One course of prednisone in the last year.  No ER visits or hospitalizations.  She is on singular 10 mg by mouth daily.    Spirometry normal.  ACT 24    - An asthma action plan was provided and discussed with patient and family.   - Albuterol 2-4 puffs inhaled (use a spacer unless using a Proair Respiclick device) every 4 hours as needed for chest tightness, wheezing, shortness of breath and/or coughing.   - Avoid asthma triggers.  - Singulair 10mg by mouth daily at night.

## 2019-07-11 NOTE — ASSESSMENT & PLAN NOTE
Negative allergy testing.  Had been on Flonase and not significantly beneficial.  On Singulair for chest symptoms.  Likely vasomotor rhinitis.    -Ipratropium 2 sprays per nostril 4 times daily as needed.  -Pataday 1 drop per eye daily as needed.  - Stop Flonase.  Has failed.  - Given she has had sinus pressure associated with colored mucus will obtain CT scan of sinuses to evaluate for chronic sinusitis.

## 2019-07-11 NOTE — PATIENT INSTRUCTIONS
Allergy Staff Appt Hours Shot Hours Locations    Physician     Murali Logan DO       Support Staff     TEJ Hensley, [facility]  Tuesday:        [place] 7-4:20     Wednesday:        [place]: 7-5     Thursday:                    [place] 7-6:40     Friday:        [place] 7-2:40   [place]        Thursday: 1-5:50        Friday: 7-10:50     [place]        Tuesday: 7- 3:20        Wednesday: 7-4:20     [place] Monday: 7-4:20        Tuesday: 1-6:20         [facility]  [address] [place] [address]  Appt Line: [phone]  Allergy RN:  [phone]    [facility]  [address] [place] [address]  Appt Line: [phone]  Allergy RN:  [phone]       Important Scheduling Information  Aspirin Desensitization: Appt will last 2 clinic days. Please call the Allergy RN line for your clinic to schedule. Discontinue antihistamines 7 days prior to the appointment.     Food Challenges: Appt will last 3-4 hours. Please call the Allergy RN line for your clinic to schedule. Discontinue antihistamines 7 days prior to the appointment.     Penicillin Testing: Appt will last 2-3 hours. Please call the Allergy RN line for your clinic to schedule. Discontinue antihistamines 7 days prior to the appointment.     Skin Testing: Appt will about 40 minutes. Call the appointment line for your clinic to schedule. Discontinue antihistamines 7 days prior to the appointment.     Venom Testing: Appt will last 2-3 hours. Please call the Allergy RN line for your clinic to schedule. Discontinue antihistamines 7 days prior to the appointment.     Thank you for trusting us with your Allergy, Asthma, and Immunology care. Please feel free to contact us with any questions or concerns you may have.      - Singulair 10mg by mouth daily at night.   - Stop Flonase.   - Ipratropium 2 sprays/nostril four times daily as needed.   - Pataday (olapatadine) 1 drop/eye daily as needed.   - Continue to avoid shellfish.  Negative testing. Could consider food challenge given negative testing.   - See anaphylaxis action plan.   - CT scan of sinuses.   - Albuterol 2-4 puffs inhaled (use a spacer unless using a Proair Respiclick device) every 4 hours as needed for chest tightness, wheezing, shortness of breath and/or coughing.

## 2019-07-11 NOTE — ASSESSMENT & PLAN NOTE
Blood testing recently negative for shellfish.  However, she reports that reproducibly and recently after consuming shellfish she has had tightness in throat and chest.  She declined to proceed with an oral food challenge secondary to the symptoms.  Currently not carry injectable epinephrine.    -Serum IgE for shellfish.  - Discussed with patient that if recurrent symptoms and recent symptoms after consuming shellfish she should probably continue to avoid.  She was given injectable epinephrine and instructed how and when to use.  She additionally was  given anaphylaxis action plan.

## 2019-07-12 LAB
CRAB IGE QN: <0.1 KU(A)/L
LOBSTER IGE QN: <0.1 KU(A)/L
SHRIMP IGE QN: <0.1 KU(A)/L

## 2019-07-13 NOTE — RESULT ENCOUNTER NOTE
Shellfish blood testing is negative. If you want to proceed with oral food challenge we can do such but I would want to do skin testing with shellfish prior to challenge. I will have nurse call to discuss. Thanks.     Dr. Logan

## 2019-07-16 ENCOUNTER — TRANSFERRED RECORDS (OUTPATIENT)
Dept: HEALTH INFORMATION MANAGEMENT | Facility: CLINIC | Age: 64
End: 2019-07-16

## 2019-07-16 ENCOUNTER — TELEPHONE (OUTPATIENT)
Dept: ALLERGY | Facility: OTHER | Age: 64
End: 2019-07-16

## 2019-07-16 NOTE — TELEPHONE ENCOUNTER
RN spoke with patient regarding lab results. patient verbalized understanding. She wishes to discuss this with her  first prior to deciding if she'd like to test further.  She will continue to avoid until then.        Luiza Byers RN

## 2019-07-24 ENCOUNTER — TELEPHONE (OUTPATIENT)
Dept: FAMILY MEDICINE | Facility: CLINIC | Age: 64
End: 2019-07-24

## 2019-07-24 ASSESSMENT — PATIENT HEALTH QUESTIONNAIRE - PHQ9: SUM OF ALL RESPONSES TO PHQ QUESTIONS 1-9: 1

## 2019-07-24 NOTE — TELEPHONE ENCOUNTER
Panel Management Review      Patient has the following on her problem list:     Diabetes    ASA: Not Required     Last A1C  Lab Results   Component Value Date    A1C 6.4 11/01/2018    A1C 6.4 08/09/2018    A1C 6.4 03/29/2018    A1C 6.2 08/29/2017    A1C 6.5 06/15/2016     A1C tested: Passed    Last LDL:    Lab Results   Component Value Date    CHOL 190 03/29/2018     Lab Results   Component Value Date    HDL 50 03/29/2018     Lab Results   Component Value Date    LDL 88 03/29/2018     Lab Results   Component Value Date    TRIG 260 03/29/2018     Lab Results   Component Value Date    CHOLHDLRATIO 3.1 01/26/2015     Lab Results   Component Value Date    NHDL 140 03/29/2018       Is the patient on a Statin? YES             Is the patient on Aspirin? NO    Medications     HMG CoA Reductase Inhibitors     atorvastatin (LIPITOR) 40 MG tablet             Last three blood pressure readings:  BP Readings from Last 3 Encounters:   07/11/19 116/78   07/05/19 116/78   04/30/19 114/76       Date of last diabetes office visit: 08/09/2018     Tobacco History:     History   Smoking Status     Never Smoker   Smokeless Tobacco     Never Used           Composite cancer screening  Chart review shows that this patient is due/due soon for the following Colonoscopy  Summary:    Patient is due/failing the following:   Preventive care visit, eye exam, phq9, lipid, a1c, Colonoscopy    Action needed:   Patient needs office visit for Physical and Diabetic Check.    Type of outreach:    Phone, spoke to patient.  informed patient that she is due for physical and diabetic check. Scheduled patient on Tuesday, July 30,2019 @ 11:30 AM with Dr. Barakat. PHQ9 completed over the phone today. PHQ9 scored @ 1.    Questions for provider review:    None                                                                                                                                    An Henok, MADELAINE       Chart routed to none .

## 2019-07-25 ENCOUNTER — TELEPHONE (OUTPATIENT)
Dept: FAMILY MEDICINE | Facility: CLINIC | Age: 64
End: 2019-07-25

## 2019-07-25 DIAGNOSIS — E11.9 TYPE 2 DIABETES, DIET CONTROLLED (H): Primary | ICD-10-CM

## 2019-07-25 NOTE — TELEPHONE ENCOUNTER
Patient states her insurance does not cover her diabetic blood sugar meter anymore- they do cover the [employer] brand. Advised patient new meter and supplies will be sent now.  Prescription approved per [facility] Refill Protocol.    Kathi Crow RN

## 2019-07-25 NOTE — TELEPHONE ENCOUNTER
Patient is calling requesting a call back from provider to discuss personal matter. Please call to discuss. Thank you.

## 2019-07-26 ENCOUNTER — ANCILLARY PROCEDURE (OUTPATIENT)
Dept: CT IMAGING | Facility: CLINIC | Age: 64
End: 2019-07-26
Attending: ALLERGY & IMMUNOLOGY
Payer: COMMERCIAL

## 2019-07-26 DIAGNOSIS — J32.8 OTHER CHRONIC SINUSITIS: Primary | ICD-10-CM

## 2019-07-26 DIAGNOSIS — J34.89 SINUS PRESSURE: ICD-10-CM

## 2019-07-26 PROCEDURE — 70486 CT MAXILLOFACIAL W/O DYE: CPT | Mod: TC

## 2019-07-26 RX ORDER — DOXYCYCLINE 100 MG/1
100 CAPSULE ORAL 2 TIMES DAILY
Qty: 42 CAPSULE | Refills: 0 | Status: SHIPPED | OUTPATIENT
Start: 2019-07-26 | End: 2020-01-02

## 2019-07-27 NOTE — RESULT ENCOUNTER NOTE
CT scan of sinuses shows evidence of sinusitis. Additionally, you have numerous erosions and infections involving teeth. Please follow up with dentistry. I would like to treat sinusitis with a long course of antibiotics. Please start doxycycline 200mg by mouth twice daily for 21 days. If remains symptomatic would refer to ENT. Thanks.     Dr. Logan

## 2019-07-28 ASSESSMENT — ENCOUNTER SYMPTOMS
DIARRHEA: 0
EYE PAIN: 0
CHILLS: 0
FEVER: 0
FREQUENCY: 0
JOINT SWELLING: 1
WEAKNESS: 0
SORE THROAT: 0
ABDOMINAL PAIN: 0
BREAST MASS: 0
NAUSEA: 0
ARTHRALGIAS: 1
PALPITATIONS: 0
HEMATOCHEZIA: 0
HEMATURIA: 0
NERVOUS/ANXIOUS: 1
COUGH: 0
CONSTIPATION: 0
MYALGIAS: 1
DYSURIA: 0
HEADACHES: 1
DIZZINESS: 0
HEARTBURN: 0

## 2019-07-29 ENCOUNTER — TELEPHONE (OUTPATIENT)
Dept: ALLERGY | Facility: CLINIC | Age: 64
End: 2019-07-29

## 2019-07-29 NOTE — TELEPHONE ENCOUNTER
----- Message from Murali Logan,  sent at 7/26/2019  7:42 PM CDT -----  CT scan of sinuses shows evidence of sinusitis. Additionally, you have numerous erosions and infections involving teeth. Please follow up with dentistry. I would like to treat sinusitis with a long course of antibiotics. Please start doxycycline 200mg by mouth twice daily for 21 days. If remains symptomatic would refer to ENT. Thanks.     Dr. Logan

## 2019-07-29 NOTE — TELEPHONE ENCOUNTER
Attempted to reach patient by phone, patient has not yet viewed [employer] message. Areli Lezama RN on 7/29/2019 at 2:11 PM

## 2019-07-30 ENCOUNTER — OFFICE VISIT (OUTPATIENT)
Dept: FAMILY MEDICINE | Facility: CLINIC | Age: 64
End: 2019-07-30
Payer: COMMERCIAL

## 2019-07-30 ENCOUNTER — ANCILLARY PROCEDURE (OUTPATIENT)
Dept: GENERAL RADIOLOGY | Facility: CLINIC | Age: 64
End: 2019-07-30
Attending: FAMILY MEDICINE
Payer: COMMERCIAL

## 2019-07-30 ENCOUNTER — MEDICAL CORRESPONDENCE (OUTPATIENT)
Dept: HEALTH INFORMATION MANAGEMENT | Facility: CLINIC | Age: 64
End: 2019-07-30

## 2019-07-30 VITALS
OXYGEN SATURATION: 98 % | TEMPERATURE: 97.4 F | RESPIRATION RATE: 18 BRPM | DIASTOLIC BLOOD PRESSURE: 78 MMHG | BODY MASS INDEX: 37.2 KG/M2 | SYSTOLIC BLOOD PRESSURE: 126 MMHG | HEART RATE: 84 BPM | WEIGHT: 190.5 LBS

## 2019-07-30 DIAGNOSIS — E11.9 TYPE 2 DIABETES, DIET CONTROLLED (H): ICD-10-CM

## 2019-07-30 DIAGNOSIS — M25.512 CHRONIC LEFT SHOULDER PAIN: ICD-10-CM

## 2019-07-30 DIAGNOSIS — Z13.6 ENCOUNTER FOR LIPID SCREENING FOR CARDIOVASCULAR DISEASE: ICD-10-CM

## 2019-07-30 DIAGNOSIS — Z13.220 ENCOUNTER FOR LIPID SCREENING FOR CARDIOVASCULAR DISEASE: ICD-10-CM

## 2019-07-30 DIAGNOSIS — Z00.00 WELL ADULT EXAM: Primary | ICD-10-CM

## 2019-07-30 DIAGNOSIS — G89.29 CHRONIC LEFT SHOULDER PAIN: ICD-10-CM

## 2019-07-30 LAB
ALBUMIN SERPL-MCNC: 3.7 G/DL (ref 3.4–5)
ALP SERPL-CCNC: 117 U/L (ref 40–150)
ALT SERPL W P-5'-P-CCNC: 36 U/L (ref 0–50)
ANION GAP SERPL CALCULATED.3IONS-SCNC: 8 MMOL/L (ref 3–14)
AST SERPL W P-5'-P-CCNC: 20 U/L (ref 0–45)
BILIRUB SERPL-MCNC: 0.3 MG/DL (ref 0.2–1.3)
BUN SERPL-MCNC: 21 MG/DL (ref 7–30)
CALCIUM SERPL-MCNC: 8.9 MG/DL (ref 8.5–10.1)
CHLORIDE SERPL-SCNC: 111 MMOL/L (ref 94–109)
CHOLEST SERPL-MCNC: 165 MG/DL
CO2 SERPL-SCNC: 23 MMOL/L (ref 20–32)
CREAT SERPL-MCNC: 0.9 MG/DL (ref 0.52–1.04)
GFR SERPL CREATININE-BSD FRML MDRD: 69 ML/MIN/{1.73_M2}
GLUCOSE SERPL-MCNC: 162 MG/DL (ref 70–99)
HBA1C MFR BLD: 6.5 % (ref 0–5.6)
HDLC SERPL-MCNC: 53 MG/DL
LDLC SERPL CALC-MCNC: 87 MG/DL
NONHDLC SERPL-MCNC: 112 MG/DL
POTASSIUM SERPL-SCNC: 4 MMOL/L (ref 3.4–5.3)
PROT SERPL-MCNC: 7.2 G/DL (ref 6.8–8.8)
SODIUM SERPL-SCNC: 142 MMOL/L (ref 133–144)
TRIGL SERPL-MCNC: 126 MG/DL

## 2019-07-30 PROCEDURE — 36415 COLL VENOUS BLD VENIPUNCTURE: CPT | Performed by: FAMILY MEDICINE

## 2019-07-30 PROCEDURE — 99213 OFFICE O/P EST LOW 20 MIN: CPT | Mod: 25 | Performed by: FAMILY MEDICINE

## 2019-07-30 PROCEDURE — 80053 COMPREHEN METABOLIC PANEL: CPT | Performed by: FAMILY MEDICINE

## 2019-07-30 PROCEDURE — 99396 PREV VISIT EST AGE 40-64: CPT | Performed by: FAMILY MEDICINE

## 2019-07-30 PROCEDURE — 80061 LIPID PANEL: CPT | Performed by: FAMILY MEDICINE

## 2019-07-30 PROCEDURE — 83036 HEMOGLOBIN GLYCOSYLATED A1C: CPT | Performed by: FAMILY MEDICINE

## 2019-07-30 PROCEDURE — 73030 X-RAY EXAM OF SHOULDER: CPT | Mod: LT

## 2019-07-30 ASSESSMENT — ENCOUNTER SYMPTOMS
ABDOMINAL PAIN: 0
COUGH: 0
DIZZINESS: 0
WEAKNESS: 0
HEMATURIA: 0
FREQUENCY: 0
BREAST MASS: 0
HEMATOCHEZIA: 0
ARTHRALGIAS: 1
HEARTBURN: 0
JOINT SWELLING: 1
NAUSEA: 0
CHILLS: 0
SORE THROAT: 0
MYALGIAS: 1
FEVER: 0
CONSTIPATION: 0
PALPITATIONS: 0
HEADACHES: 1
NERVOUS/ANXIOUS: 1
DYSURIA: 0
DIARRHEA: 0
EYE PAIN: 0

## 2019-07-30 ASSESSMENT — ASTHMA QUESTIONNAIRES
QUESTION_1 LAST FOUR WEEKS HOW MUCH OF THE TIME DID YOUR ASTHMA KEEP YOU FROM GETTING AS MUCH DONE AT WORK, SCHOOL OR AT HOME: NONE OF THE TIME
QUESTION_3 LAST FOUR WEEKS HOW OFTEN DID YOUR ASTHMA SYMPTOMS (WHEEZING, COUGHING, SHORTNESS OF BREATH, CHEST TIGHTNESS OR PAIN) WAKE YOU UP AT NIGHT OR EARLIER THAN USUAL IN THE MORNING: NOT AT ALL
ACUTE_EXACERBATION_TODAY: NO
QUESTION_2 LAST FOUR WEEKS HOW OFTEN HAVE YOU HAD SHORTNESS OF BREATH: NOT AT ALL
QUESTION_5 LAST FOUR WEEKS HOW WOULD YOU RATE YOUR ASTHMA CONTROL: COMPLETELY CONTROLLED
ACT_TOTALSCORE: 25
QUESTION_4 LAST FOUR WEEKS HOW OFTEN HAVE YOU USED YOUR RESCUE INHALER OR NEBULIZER MEDICATION (SUCH AS ALBUTEROL): NOT AT ALL

## 2019-07-30 NOTE — TELEPHONE ENCOUNTER
Per chart review, patient has read [employer] message on 7/26/19. Closing encounter.       Luiza Byers RN
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
## 2019-07-30 NOTE — LETTER
[facility]  [address] [place] [address]    August 2, 2019    Farzana Hughes  [address] [place] [address]          Dear Farzana,    Your labs are not concerning at this time.  Your liver, kidney, and electrolytes are normal.  Your cholesterol panel looks great.  A1C is stable at 6.5%.  Let me know if you have questions    Enclosed is a copy of your results.     Results for orders placed or performed in visit on 07/30/19   Hemoglobin A1c   Result Value Ref Range    Hemoglobin A1C 6.5 (H) 0 - 5.6 %   Lipid panel reflex to direct LDL Non-fasting   Result Value Ref Range    Cholesterol 165 <200 mg/dL    Triglycerides 126 <150 mg/dL    HDL Cholesterol 53 >49 mg/dL    LDL Cholesterol Calculated 87 <100 mg/dL    Non HDL Cholesterol 112 <130 mg/dL   Comprehensive metabolic panel   Result Value Ref Range    Sodium 142 133 - 144 mmol/L    Potassium 4.0 3.4 - 5.3 mmol/L    Chloride 111 (H) 94 - 109 mmol/L    Carbon Dioxide 23 20 - 32 mmol/L    Anion Gap 8 3 - 14 mmol/L    Glucose 162 (H) 70 - 99 mg/dL    Urea Nitrogen 21 7 - 30 mg/dL    Creatinine 0.90 0.52 - 1.04 mg/dL    GFR Estimate 69 >60 mL/min/[1.73_m2]    GFR Estimate If Black 80 >60 mL/min/[1.73_m2]    Calcium 8.9 8.5 - 10.1 mg/dL    Bilirubin Total 0.3 0.2 - 1.3 mg/dL    Albumin 3.7 3.4 - 5.0 g/dL    Protein Total 7.2 6.8 - 8.8 g/dL    Alkaline Phosphatase 117 40 - 150 U/L    ALT 36 0 - 50 U/L    AST 20 0 - 45 U/L       If you have any questions or concerns, please call myself or my nurse at [phone].      Sincerely,        Villa Barakat MD/dejan

## 2019-07-31 ASSESSMENT — ASTHMA QUESTIONNAIRES: ACT_TOTALSCORE: 25

## 2019-08-09 ENCOUNTER — TRANSFERRED RECORDS (OUTPATIENT)
Dept: HEALTH INFORMATION MANAGEMENT | Facility: CLINIC | Age: 64
End: 2019-08-09

## 2019-08-12 DIAGNOSIS — J45.40 MODERATE PERSISTENT ASTHMA, UNCOMPLICATED: ICD-10-CM

## 2019-08-13 RX ORDER — MONTELUKAST SODIUM 10 MG/1
TABLET ORAL
Qty: 30 TABLET | Refills: 2 | Status: SHIPPED | OUTPATIENT
Start: 2019-08-13 | End: 2019-12-04

## 2019-08-22 ENCOUNTER — OFFICE VISIT (OUTPATIENT)
Dept: ALLERGY | Facility: CLINIC | Age: 64
End: 2019-08-22
Payer: COMMERCIAL

## 2019-08-22 ENCOUNTER — TELEPHONE (OUTPATIENT)
Dept: ALLERGY | Facility: CLINIC | Age: 64
End: 2019-08-22

## 2019-08-22 VITALS
OXYGEN SATURATION: 97 % | WEIGHT: 190 LBS | TEMPERATURE: 97.4 F | HEART RATE: 72 BPM | BODY MASS INDEX: 37.3 KG/M2 | HEIGHT: 60 IN | DIASTOLIC BLOOD PRESSURE: 71 MMHG | SYSTOLIC BLOOD PRESSURE: 103 MMHG

## 2019-08-22 DIAGNOSIS — J45.40 MODERATE PERSISTENT ASTHMA WITHOUT COMPLICATION: ICD-10-CM

## 2019-08-22 DIAGNOSIS — L30.9 DERMATITIS: Primary | ICD-10-CM

## 2019-08-22 DIAGNOSIS — J31.0 NON-ALLERGIC RHINITIS: ICD-10-CM

## 2019-08-22 DIAGNOSIS — Z91.013 SHELLFISH ALLERGY: ICD-10-CM

## 2019-08-22 PROCEDURE — 99214 OFFICE O/P EST MOD 30 MIN: CPT | Performed by: ALLERGY & IMMUNOLOGY

## 2019-08-22 RX ORDER — ALBUTEROL SULFATE 90 UG/1
2 AEROSOL, METERED RESPIRATORY (INHALATION) EVERY 4 HOURS PRN
Qty: 1 INHALER | Refills: 3 | Status: SHIPPED | OUTPATIENT
Start: 2019-08-22 | End: 2021-03-02

## 2019-08-22 RX ORDER — TRIAMCINOLONE ACETONIDE 1 MG/G
OINTMENT TOPICAL 2 TIMES DAILY
Qty: 80 G | Refills: 1 | Status: SHIPPED | OUTPATIENT
Start: 2019-08-22 | End: 2020-09-14

## 2019-08-22 RX ORDER — CETIRIZINE HYDROCHLORIDE 10 MG/1
10 TABLET ORAL DAILY
Qty: 30 TABLET | Refills: 1 | Status: SHIPPED | OUTPATIENT
Start: 2019-08-22 | End: 2020-09-14

## 2019-08-22 ASSESSMENT — MIFFLIN-ST. JEOR: SCORE: 1348.33
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
## 2019-08-22 NOTE — TELEPHONE ENCOUNTER
Provided clarification on where Kenalog was to be applied to [facility] pharmacist.  Closing encounter.    Luiza Byers RN
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
## 2019-08-22 NOTE — TELEPHONE ENCOUNTER
Reason for Call:  Medication or medication refill:    Do you use a [facility]?  Name of the pharmacy and phone number for the current request:  [place] [address]  [phone]    Name of the medication requested: triamcinolone (KENALOG)     Other request: pharmacy calling for clarification on dose.    Can we leave a detailed message on this number? YES    Phone number patient can be reached at: Other phone number:  [phone]*    Best Time:     Call taken on 8/22/2019 at 11:28 AM by Maci Whiting

## 2019-08-22 NOTE — PATIENT INSTRUCTIONS
Allergy Staff Appt Hours Shot Hours Locations    Physician     Murali Logan DO       Support Staff     TEJ Hensley, MADELAINE  Tuesday:        [place] 7-4:20     Wednesday:        [place]: 7-5 Thursday:                    [place] 7-6:40     Friday:  [place]  7-2:40   [place]        Thursday: 1-5:50        Friday: 7-10:50     [place]        Tuesday: 7- 3:20        Wednesday: 7-4:20     [place] Monday: 7-4:20 Tuesday: 1-6:20         [facility]  [address] [place] [address]  Appt Line: [phone]  Allergy RN:  [phone]    [facility]  [address] [place] [address]  Appt Line: [phone]  Allergy RN:  [phone]       Important Scheduling Information  Aspirin Desensitization: Appt will last 2 clinic days. Please call the Allergy RN line for your clinic to schedule. Discontinue antihistamines 7 days prior to the appointment.     Food Challenges: Appt will last 3-4 hours. Please call the Allergy RN line for your clinic to schedule. Discontinue antihistamines 7 days prior to the appointment.     Penicillin Testing: Appt will last 2-3 hours. Please call the Allergy RN line for your clinic to schedule. Discontinue antihistamines 7 days prior to the appointment.     Skin Testing: Appt will about 40 minutes. Call the appointment line for your clinic to schedule. Discontinue antihistamines 7 days prior to the appointment.     Venom Testing: Appt will last 2-3 hours. Please call the Allergy RN line for your clinic to schedule. Discontinue antihistamines 7 days prior to the appointment.     Thank you for trusting us with your Allergy, Asthma, and Immunology care. Please feel free to contact us with any questions or concerns you may have.      - Singulair 10mg by mouth daily at night.   - Albuterol 2-4 puffs inhaled (use a spacer unless using a Proair Respiclick device) every 4 hours as needed for chest tightness, wheezing, shortness of breath and/or  coughing.   - Albuterol 2-4 puffs inhaled (use spacer if not using Proair Respiclick device) 15-20 minutes prior to physical activity.   Please ensure warm up period prior to exercise.   - Ipratropium 2 sprays/nostril four times daily as needed.   - Cetirizine 10mg by mouth daily for itching with rash.   - Triacminolone 0.1% ointment twice daily to rash. If rash persist I would be seen by dermatology. [facility] in [place].   - Avoid fragrance.   - Consider return for shellfish testing. Continue to avoid for now.

## 2019-08-22 NOTE — ASSESSMENT & PLAN NOTE
Rough, dry, erythematous dermatitis on bilateral upper extremities.  Excoriations noted.  Has occurred after she has been out working in her garden.  Question of plant dermatitis.    -Cetirizine 10 mg by mouth daily for pruritus.  -Triamcinolone 0.1% ointment twice daily as needed for rash.  -If rash persist would refer to dermatology.  Discussed [facility] with patient.

## 2019-08-22 NOTE — ASSESSMENT & PLAN NOTE
Blood testing recently negative for shellfish.  However, she reports that reproducibly and recently after consuming shellfish she has had tightness in throat and chest.  She declined to proceed with an oral food challenge secondary to the symptoms.  Currently not carry injectable epinephrine.     Shellfish blood testing was negative.  We had discussed obtaining skin testing.  She does not want to do so today.  She will continue to avoid shellfish and carry injectable epinephrine for now.

## 2019-08-22 NOTE — ASSESSMENT & PLAN NOTE
Negative allergy testing.    CT scan of sinuses showed sinusitis.  Treated with doxycycline.  She had significant improvement in sinus pressure and colored mucus.  Also on ipratropium for chronic rhinorrhea which has been significantly beneficial.       -Ipratropium 2 sprays per nostril 4 times daily as needed.  -Pataday 1 drop per eye daily as needed.

## 2019-08-22 NOTE — ASSESSMENT & PLAN NOTE
History of asthma.  Asthma has been well controlled.  Asthma flares with chemical/perfume exposure.  She additionally has problems with exertion. One course of prednisone in the last year.  No ER visits or hospitalizations.  She is on singular 10 mg by mouth daily.       ACT 23     - Albuterol 2-4 puffs inhaled (use spacer if not using Proair Respiclick device) 15-20 minutes prior to physical activity.   Please ensure warm up period prior to exercise.   - Albuterol 2-4 puffs inhaled (use a spacer unless using a Proair Respiclick device) every 4 hours as needed for chest tightness, wheezing, shortness of breath and/or coughing.   - Avoid asthma triggers.  - Singulair 10mg by mouth daily at night.

## 2019-08-23 ASSESSMENT — ASTHMA QUESTIONNAIRES: ACT_TOTALSCORE: 23

## 2019-09-03 ENCOUNTER — TRANSFERRED RECORDS (OUTPATIENT)
Dept: HEALTH INFORMATION MANAGEMENT | Facility: CLINIC | Age: 64
End: 2019-09-03

## 2019-09-03 LAB — COLOGUARD-ABSTRACT: POSITIVE

## 2019-09-09 ENCOUNTER — TELEPHONE (OUTPATIENT)
Dept: FAMILY MEDICINE | Facility: CLINIC | Age: 64
End: 2019-09-09

## 2019-09-09 NOTE — TELEPHONE ENCOUNTER
Cologuard results have been received.     The results are: Positive    Result Date:09/08/2019    Results have been placed in provider basket- provider to review and sign off on results. Please send back to team with OK to send result letter and any additional follow-up needed.      will send a copy of results to scanning.

## 2019-10-04 ENCOUNTER — HEALTH MAINTENANCE LETTER (OUTPATIENT)
Age: 64
End: 2019-10-04

## 2019-10-08 DIAGNOSIS — J32.8 OTHER CHRONIC SINUSITIS: ICD-10-CM

## 2019-10-09 RX ORDER — DOXYCYCLINE 100 MG/1
100 CAPSULE ORAL 2 TIMES DAILY
Qty: 42 CAPSULE | Refills: 0 | OUTPATIENT
Start: 2019-10-09

## 2019-10-14 ENCOUNTER — TELEPHONE (OUTPATIENT)
Dept: FAMILY MEDICINE | Facility: CLINIC | Age: 64
End: 2019-10-14

## 2019-10-14 ENCOUNTER — TRANSFERRED RECORDS (OUTPATIENT)
Dept: MULTI SPECIALTY CLINIC | Facility: CLINIC | Age: 64
End: 2019-10-14

## 2019-10-14 ENCOUNTER — OFFICE VISIT (OUTPATIENT)
Dept: FAMILY MEDICINE | Facility: CLINIC | Age: 64
End: 2019-10-14
Payer: COMMERCIAL

## 2019-10-14 VITALS
HEART RATE: 82 BPM | HEIGHT: 60 IN | OXYGEN SATURATION: 98 % | RESPIRATION RATE: 16 BRPM | SYSTOLIC BLOOD PRESSURE: 118 MMHG | DIASTOLIC BLOOD PRESSURE: 72 MMHG | WEIGHT: 197.5 LBS | BODY MASS INDEX: 38.77 KG/M2 | TEMPERATURE: 97.8 F

## 2019-10-14 DIAGNOSIS — M25.562 CHRONIC PAIN OF BOTH KNEES: ICD-10-CM

## 2019-10-14 DIAGNOSIS — G89.29 CHRONIC HIP PAIN, BILATERAL: ICD-10-CM

## 2019-10-14 DIAGNOSIS — Z12.11 SCREEN FOR COLON CANCER: ICD-10-CM

## 2019-10-14 DIAGNOSIS — G89.29 CHRONIC PAIN OF BOTH KNEES: ICD-10-CM

## 2019-10-14 DIAGNOSIS — M25.551 CHRONIC HIP PAIN, BILATERAL: ICD-10-CM

## 2019-10-14 DIAGNOSIS — Z23 NEED FOR STREPTOCOCCUS PNEUMONIAE VACCINATION: ICD-10-CM

## 2019-10-14 DIAGNOSIS — M25.561 CHRONIC PAIN OF BOTH KNEES: ICD-10-CM

## 2019-10-14 DIAGNOSIS — J31.0 NON-ALLERGIC RHINITIS: ICD-10-CM

## 2019-10-14 DIAGNOSIS — M25.552 CHRONIC HIP PAIN, BILATERAL: ICD-10-CM

## 2019-10-14 DIAGNOSIS — E11.9 TYPE 2 DIABETES, DIET CONTROLLED (H): Primary | ICD-10-CM

## 2019-10-14 LAB — RETINOPATHY: NEGATIVE

## 2019-10-14 PROCEDURE — 90732 PPSV23 VACC 2 YRS+ SUBQ/IM: CPT | Performed by: FAMILY MEDICINE

## 2019-10-14 PROCEDURE — 99213 OFFICE O/P EST LOW 20 MIN: CPT | Mod: 25 | Performed by: FAMILY MEDICINE

## 2019-10-14 PROCEDURE — 92250 FUNDUS PHOTOGRAPHY W/I&R: CPT | Mod: 26 | Performed by: OPTOMETRIST

## 2019-10-14 PROCEDURE — 90471 IMMUNIZATION ADMIN: CPT | Performed by: FAMILY MEDICINE

## 2019-10-14 PROCEDURE — 92250 FUNDUS PHOTOGRAPHY W/I&R: CPT | Mod: TC | Performed by: FAMILY MEDICINE

## 2019-10-14 ASSESSMENT — MIFFLIN-ST. JEOR: SCORE: 1377.35

## 2019-10-14 NOTE — TELEPHONE ENCOUNTER
Patient lost the prescription for her walker, new prescription printed for provider signature- clarified with patient she uses a 4 wheeled walker.   Kathi Crow RN

## 2019-10-14 NOTE — NURSING NOTE
Prior to immunization administration, verified patients identity using patient s name and date of birth. Please see Immunization Activity for additional information.     Screening Questionnaire for Adult Immunization    Are you sick today?   No   Do you have allergies to medications, food, a vaccine component or latex?   Yes   Have you ever had a serious reaction after receiving a vaccination?   No   Do you have a long-term health problem with heart disease, lung disease, asthma, kidney disease, metabolic disease (e.g. diabetes), anemia, or other blood disorder?   Yes   Do you have cancer, leukemia, HIV/AIDS, or any other immune system problem?   No   In the past 3 months, have you taken medications that affect  your immune system, such as prednisone, other steroids, or anticancer drugs; drugs for the treatment of rheumatoid arthritis, Crohn s disease, or psoriasis; or have you had radiation treatments?   No   Have you had a seizure, or a brain or other nervous system problem?   Yes   During the past year, have you received a transfusion of blood or blood     products, or been given immune (gamma) globulin or antiviral drug?   No   For women: Are you pregnant or is there a chance you could become        pregnant during the next month?   No   Have you received any vaccinations in the past 4 weeks?   No     Immunization questionnaire was positive for at least one answer.  Notified MD.        Per orders of Dr. Barakat, injection of PPSV23 given by Mariajose Guerrier CMA. Patient instructed to remain in clinic for 15 minutes afterwards, and to report any adverse reaction to me immediately.       Screening performed by Mariajose Guerrier CMA on 10/14/2019 at 3:43 PM.

## 2019-10-14 NOTE — LETTER
[facility]  [address] [place] [address]    October 16, 2019    Farzana Hughes  [address] [place] [address]          Dear Farzana,    Your eye scan showed that you don't have damage from diabetes.  There are other non-specific abnormalities however which you should discuss with your eye doctor    Enclosed is a copy of your results.     Results for orders placed or performed in visit on 09/03/19   ABSTRACT COLOGUARD-NO CHARGE   Result Value Ref Range    COLOGUARD-ABSTRACT Positive     Narrative    COLOGUARD EXACT SCIENCES       If you have any questions or concerns, please call myself or my nurse at [phone].      Sincerely,        Villa Baarkat MD/dejan

## 2019-10-25 ENCOUNTER — TRANSFERRED RECORDS (OUTPATIENT)
Dept: HEALTH INFORMATION MANAGEMENT | Facility: CLINIC | Age: 64
End: 2019-10-25

## 2019-11-02 ENCOUNTER — HEALTH MAINTENANCE LETTER (OUTPATIENT)
Age: 64
End: 2019-11-02

## 2019-11-11 ENCOUNTER — TELEPHONE (OUTPATIENT)
Dept: FAMILY MEDICINE | Facility: CLINIC | Age: 64
End: 2019-11-11

## 2019-11-11 NOTE — TELEPHONE ENCOUNTER
Called patient to ask what procedure she is referring to- she was wondering when she would be called for her colonoscopy.  Advised that the referral was placed and offered number to call and schedule- patient has number and copy of referral.   She will call number   Kathi Crow RN

## 2019-11-11 NOTE — TELEPHONE ENCOUNTER
Reason for Call:  Other call back    Detailed comments: Patient calling stating no one  Has contacted her regarding scheduling a procedure and is wondering what she should be doing. Please call to advise.     Phone Number Patient can be reached at: Home number on file [phone] (home)    Best Time: Any     Can we leave a detailed message on this number? YES    Call taken on 11/11/2019 at 1:31 PM by Bessie Lyons

## 2019-11-21 ENCOUNTER — HOSPITAL ENCOUNTER (OUTPATIENT)
Facility: AMBULATORY SURGERY CENTER | Age: 64
End: 2019-11-21
Attending: SURGERY | Admitting: SURGERY
Payer: COMMERCIAL

## 2019-11-21 ENCOUNTER — TELEPHONE (OUTPATIENT)
Dept: FAMILY MEDICINE | Facility: CLINIC | Age: 64
End: 2019-11-21

## 2019-11-21 NOTE — TELEPHONE ENCOUNTER
Reason for call:  Symptom   Symptom or request: sick to her stomach, not able to keep food down    Duration (how long have symptoms been present): since shot given on 10-14-19 (not sure name of the shot)  Have you been treated for this before? No    Additional comments: please call to advise.    Phone number to reach patient:  Home number on file [phone] (home)    Best Time:  any    Can we leave a detailed message on this number?  YES

## 2019-11-25 ENCOUNTER — HOSPITAL ENCOUNTER (OUTPATIENT)
Facility: AMBULATORY SURGERY CENTER | Age: 64
End: 2019-11-25
Attending: INTERNAL MEDICINE | Admitting: SURGERY
Payer: COMMERCIAL

## 2019-11-26 ENCOUNTER — TELEPHONE (OUTPATIENT)
Dept: SURGERY | Facility: CLINIC | Age: 64
End: 2019-11-26

## 2019-11-26 NOTE — TELEPHONE ENCOUNTER
Procedure; colonoscopy  Provider; Dr. Barker  COLONOSCOPY / BMI: 38.57 / REF-PCP [facility] / [facility]  Cancelled from 11/27/19 and rescheduled to 1/8/2020.    Previous  did not reference preferred pharmacy.    This writer does not have access to procedure dot phrase.

## 2019-12-02 DIAGNOSIS — J45.40 MODERATE PERSISTENT ASTHMA, UNCOMPLICATED: ICD-10-CM

## 2019-12-03 NOTE — PROGRESS NOTES
Subjective     Farzana Hughes is a 64 year old female who presents to clinic today for the following health issues:    HPI   Abdominal Pain      Duration: over 1 month    Description (location/character/radiation): upper stomach       Associated flank pain: None    Intensity:  moderate    Accompanying signs and symptoms:        Fever/Chills: no        Gas/Bloating: no        Nausea/vomitting: YES       Diarrhea: YES       Dysuria or Hematuria: no     History (previous similar pain/trauma/previous testing): had ulcers prior    Precipitating or alleviating factors:       Pain worse with eating/BM/urination: none       Pain relieved by BM: yes    Therapies tried and outcome: omeprazole, probiotic    LMP:  not applicable      Patient presents with abdominal pain  Vomiting about 1 month ago, which has resolved  Has had an upset stomach ever since her pneumonia vaccine?  Taking omeprazole and probiotics  Runny stool intermittent for the past month, not at the moment however  Last vomit episode was 1 week ago  BRAT diet, soup  topamax was increased about 6 months ago,  And may be associated as common side effect.  Patient states she has possible history of hepatitis B      Wt Readings from Last 4 Encounters:   12/05/19 89.6 kg (197 lb 8 oz)   10/14/19 89.6 kg (197 lb 8 oz)   08/22/19 86.2 kg (190 lb)   07/30/19 86.4 kg (190 lb 8 oz)       BP Readings from Last 3 Encounters:   12/05/19 122/84   10/14/19 118/72   08/22/19 103/71    Wt Readings from Last 3 Encounters:   12/05/19 89.6 kg (197 lb 8 oz)   10/14/19 89.6 kg (197 lb 8 oz)   08/22/19 86.2 kg (190 lb)        Reviewed and updated as needed this visit by Provider  Tobacco  Allergies  Meds  Problems  Med Hx  Surg Hx  Fam Hx         Review of Systems   ROS COMP: Constitutional, HEENT, cardiovascular, pulmonary, gi and gu systems are negative, except as otherwise noted.      Objective    /84   Pulse 88   Temp 98.4  F (36.9  C) (Oral)   Resp 18   Wt 89.6  kg (197 lb 8 oz)   SpO2 99%   BMI 38.57 kg/m    Body mass index is 38.57 kg/m .  Physical Exam   GENERAL: healthy, alert and no distress  EYES: Eyes grossly normal to inspection, PERRL and conjunctivae and sclerae normal  NECK: no adenopathy, no asymmetry, masses, or scars and thyroid normal to palpation  RESP: lungs clear to auscultation - no rales, rhonchi or wheezes  CV: regular rate and rhythm, normal S1 S2, no S3 or S4, no murmur, click or rub, no peripheral edema and peripheral pulses strong  ABDOMEN: soft, mid-epigastric tenderness, no hepatosplenomegaly, no masses and bowel sounds normal  PSYCH: mentation appears normal, affect normal/bright        Assessment & Plan       ICD-10-CM    1. Gastroesophageal reflux disease without esophagitis K21.9 Hemoglobin A1c     Comprehensive metabolic panel     CBC with platelets and differential     US Abdomen Complete   2. Acute gastritis without hemorrhage, unspecified gastritis type K29.00 Hemoglobin A1c     Comprehensive metabolic panel     CBC with platelets and differential     Lipase     US Abdomen Complete   3. Type 2 diabetes, diet controlled (H) E11.9 Hemoglobin A1c     Comprehensive metabolic panel     CBC with platelets and differential   4. Benign essential hypertension I10 Hemoglobin A1c     Comprehensive metabolic panel     CBC with platelets and differential   5. History of hepatitis B Z86.19 Hepatitis B core antibody     Hepatitis B Surface Antibody     Hepatitis B surface antigen     Hep B Virus DNA Quant Real Time PCR   6. Recurrent major depression in complete remission (H) F33.42    7. Morbid obesity (H) E66.01      Will rule out gallbladder issue with ultrasound  Check for hepatitis b given possible history?  PPI, prn zofran, BRAT diet      Follow-up in 2 weeks  Villa Blake MD  [facility]

## 2019-12-04 RX ORDER — MONTELUKAST SODIUM 10 MG/1
TABLET ORAL
Qty: 30 TABLET | Refills: 1 | Status: SHIPPED | OUTPATIENT
Start: 2019-12-04 | End: 2020-02-24

## 2019-12-05 ENCOUNTER — OFFICE VISIT (OUTPATIENT)
Dept: FAMILY MEDICINE | Facility: CLINIC | Age: 64
End: 2019-12-05
Payer: COMMERCIAL

## 2019-12-05 VITALS
DIASTOLIC BLOOD PRESSURE: 84 MMHG | HEART RATE: 88 BPM | WEIGHT: 197.5 LBS | TEMPERATURE: 98.4 F | RESPIRATION RATE: 18 BRPM | BODY MASS INDEX: 38.57 KG/M2 | OXYGEN SATURATION: 99 % | SYSTOLIC BLOOD PRESSURE: 122 MMHG

## 2019-12-05 DIAGNOSIS — K21.9 GASTROESOPHAGEAL REFLUX DISEASE WITHOUT ESOPHAGITIS: Primary | ICD-10-CM

## 2019-12-05 DIAGNOSIS — E66.01 MORBID OBESITY (H): ICD-10-CM

## 2019-12-05 DIAGNOSIS — F33.42 RECURRENT MAJOR DEPRESSION IN COMPLETE REMISSION (H): ICD-10-CM

## 2019-12-05 DIAGNOSIS — Z86.19 HISTORY OF HEPATITIS B: ICD-10-CM

## 2019-12-05 DIAGNOSIS — I10 BENIGN ESSENTIAL HYPERTENSION: ICD-10-CM

## 2019-12-05 DIAGNOSIS — E11.9 TYPE 2 DIABETES, DIET CONTROLLED (H): ICD-10-CM

## 2019-12-05 DIAGNOSIS — K29.00 ACUTE GASTRITIS WITHOUT HEMORRHAGE, UNSPECIFIED GASTRITIS TYPE: ICD-10-CM

## 2019-12-05 LAB
ALBUMIN SERPL-MCNC: 3.8 G/DL (ref 3.4–5)
ALP SERPL-CCNC: 113 U/L (ref 40–150)
ALT SERPL W P-5'-P-CCNC: 35 U/L (ref 0–50)
ANION GAP SERPL CALCULATED.3IONS-SCNC: 2 MMOL/L (ref 3–14)
AST SERPL W P-5'-P-CCNC: 15 U/L (ref 0–45)
BASOPHILS # BLD AUTO: 0 10E9/L (ref 0–0.2)
BASOPHILS NFR BLD AUTO: 0.3 %
BILIRUB SERPL-MCNC: 0.2 MG/DL (ref 0.2–1.3)
BUN SERPL-MCNC: 21 MG/DL (ref 7–30)
CALCIUM SERPL-MCNC: 9 MG/DL (ref 8.5–10.1)
CHLORIDE SERPL-SCNC: 110 MMOL/L (ref 94–109)
CO2 SERPL-SCNC: 28 MMOL/L (ref 20–32)
CREAT SERPL-MCNC: 0.89 MG/DL (ref 0.52–1.04)
DIFFERENTIAL METHOD BLD: NORMAL
EOSINOPHIL # BLD AUTO: 0.2 10E9/L (ref 0–0.7)
EOSINOPHIL NFR BLD AUTO: 2.6 %
ERYTHROCYTE [DISTWIDTH] IN BLOOD BY AUTOMATED COUNT: 13.5 % (ref 10–15)
GFR SERPL CREATININE-BSD FRML MDRD: 70 ML/MIN/{1.73_M2}
GLUCOSE SERPL-MCNC: 121 MG/DL (ref 70–99)
HBA1C MFR BLD: 6.4 % (ref 0–5.6)
HCT VFR BLD AUTO: 40.4 % (ref 35–47)
HGB BLD-MCNC: 13.1 G/DL (ref 11.7–15.7)
LIPASE SERPL-CCNC: 182 U/L (ref 73–393)
LYMPHOCYTES # BLD AUTO: 2.9 10E9/L (ref 0.8–5.3)
LYMPHOCYTES NFR BLD AUTO: 31.7 %
MCH RBC QN AUTO: 29.4 PG (ref 26.5–33)
MCHC RBC AUTO-ENTMCNC: 32.4 G/DL (ref 31.5–36.5)
MCV RBC AUTO: 91 FL (ref 78–100)
MONOCYTES # BLD AUTO: 0.8 10E9/L (ref 0–1.3)
MONOCYTES NFR BLD AUTO: 8.9 %
NEUTROPHILS # BLD AUTO: 5.1 10E9/L (ref 1.6–8.3)
NEUTROPHILS NFR BLD AUTO: 56.5 %
PLATELET # BLD AUTO: 363 10E9/L (ref 150–450)
POTASSIUM SERPL-SCNC: 4.1 MMOL/L (ref 3.4–5.3)
PROT SERPL-MCNC: 7.5 G/DL (ref 6.8–8.8)
RBC # BLD AUTO: 4.46 10E12/L (ref 3.8–5.2)
SODIUM SERPL-SCNC: 140 MMOL/L (ref 133–144)
WBC # BLD AUTO: 9 10E9/L (ref 4–11)

## 2019-12-05 PROCEDURE — 86706 HEP B SURFACE ANTIBODY: CPT | Performed by: FAMILY MEDICINE

## 2019-12-05 PROCEDURE — 83036 HEMOGLOBIN GLYCOSYLATED A1C: CPT | Performed by: FAMILY MEDICINE

## 2019-12-05 PROCEDURE — 80053 COMPREHEN METABOLIC PANEL: CPT | Performed by: FAMILY MEDICINE

## 2019-12-05 PROCEDURE — 36415 COLL VENOUS BLD VENIPUNCTURE: CPT | Performed by: FAMILY MEDICINE

## 2019-12-05 PROCEDURE — 87340 HEPATITIS B SURFACE AG IA: CPT | Performed by: FAMILY MEDICINE

## 2019-12-05 PROCEDURE — 87517 HEPATITIS B DNA QUANT: CPT | Performed by: FAMILY MEDICINE

## 2019-12-05 PROCEDURE — 85025 COMPLETE CBC W/AUTO DIFF WBC: CPT | Performed by: FAMILY MEDICINE

## 2019-12-05 PROCEDURE — 99214 OFFICE O/P EST MOD 30 MIN: CPT | Performed by: FAMILY MEDICINE

## 2019-12-05 PROCEDURE — 83690 ASSAY OF LIPASE: CPT | Performed by: FAMILY MEDICINE

## 2019-12-05 PROCEDURE — 86704 HEP B CORE ANTIBODY TOTAL: CPT | Performed by: FAMILY MEDICINE

## 2019-12-06 ENCOUNTER — TELEPHONE (OUTPATIENT)
Dept: FAMILY MEDICINE | Facility: CLINIC | Age: 64
End: 2019-12-06

## 2019-12-06 DIAGNOSIS — R11.2 NAUSEA AND VOMITING, INTRACTABILITY OF VOMITING NOT SPECIFIED, UNSPECIFIED VOMITING TYPE: Primary | ICD-10-CM

## 2019-12-06 LAB
HBV DNA SERPL NAA+PROBE-ACNC: NORMAL [IU]/ML
HBV DNA SERPL NAA+PROBE-LOG IU: NORMAL {LOG_IU}/ML

## 2019-12-06 RX ORDER — ONDANSETRON 4 MG/1
4 TABLET, ORALLY DISINTEGRATING ORAL EVERY 8 HOURS PRN
Qty: 30 TABLET | Refills: 1 | Status: SHIPPED | OUTPATIENT
Start: 2019-12-06 | End: 2020-09-14

## 2019-12-06 NOTE — TELEPHONE ENCOUNTER
Reason for Call:  Other prescription    Detailed comments: Pt was supposed to get a medication script for a Silphanex sent to her pharmacy. Pt states after she saw Dr. Barakat yesterday he was going to send the script. But the pharmacy hasn't received anything yet.      Phone Number Patient can be reached at: Cell number on file:    Telephone Information:   Mobile [phone]       Best Time: any    Can we leave a detailed message on this number? YES    Call taken on 12/6/2019 at 11:13 AM by Demarco Whiting

## 2019-12-06 NOTE — TELEPHONE ENCOUNTER
What medication is patient suppose to be on? nothing is listed on med list or on OV from yesterday  Fidel Whiting CMA on 12/6/2019 at 11:20 AM

## 2019-12-07 LAB
HBV CORE AB SERPL QL IA: NONREACTIVE
HBV SURFACE AB SERPL IA-ACNC: 0.65 M[IU]/ML
HBV SURFACE AG SERPL QL IA: NONREACTIVE

## 2019-12-09 ENCOUNTER — ANCILLARY PROCEDURE (OUTPATIENT)
Dept: ULTRASOUND IMAGING | Facility: CLINIC | Age: 64
End: 2019-12-09
Attending: FAMILY MEDICINE
Payer: COMMERCIAL

## 2019-12-09 DIAGNOSIS — K29.00 ACUTE GASTRITIS WITHOUT HEMORRHAGE, UNSPECIFIED GASTRITIS TYPE: ICD-10-CM

## 2019-12-09 DIAGNOSIS — K21.9 GASTROESOPHAGEAL REFLUX DISEASE WITHOUT ESOPHAGITIS: ICD-10-CM

## 2019-12-09 PROCEDURE — 76700 US EXAM ABDOM COMPLETE: CPT

## 2019-12-17 NOTE — PROGRESS NOTES
Subjective     Farzana Hughes is a 64 year old female who presents to clinic today for the following health issues:    HPI   Chief Complaint   Patient presents with     Patient Request     Go over test results ; would like to discuss losing weight and things should and shouldnt eat     Patient presents to discuss ultrasound results  Patient has chronic RUQ abdominal pain  Ultrasound shows gallstone x 1 with gallbladder polyp  No associated nausea/vomiting/diarrhea  Patient has been trying to lose weight however has had trouble with this given sedentary lifestyle.    Wt Readings from Last 4 Encounters:   12/19/19 90.7 kg (200 lb)   12/05/19 89.6 kg (197 lb 8 oz)   10/14/19 89.6 kg (197 lb 8 oz)   08/22/19 86.2 kg (190 lb)     BP Readings from Last 3 Encounters:   12/19/19 116/74   12/05/19 122/84   10/14/19 118/72    Wt Readings from Last 3 Encounters:   12/19/19 90.7 kg (200 lb)   12/05/19 89.6 kg (197 lb 8 oz)   10/14/19 89.6 kg (197 lb 8 oz)        Reviewed and updated as needed this visit by Provider  Tobacco  Allergies  Meds  Problems  Med Hx  Surg Hx  Fam Hx         Review of Systems   ROS COMP: Constitutional, HEENT, cardiovascular, pulmonary, gi and gu systems are negative, except as otherwise noted.      Objective    /74   Pulse 91   Temp 97.7  F (36.5  C) (Oral)   Resp 16   Wt 90.7 kg (200 lb)   SpO2 97%   BMI 39.06 kg/m    Body mass index is 39.06 kg/m .  Physical Exam   GENERAL: healthy, alert and no distress  EYES: Eyes grossly normal to inspection, PERRL and conjunctivae and sclerae normal  NECK: no adenopathy, no asymmetry, masses, or scars and thyroid normal to palpation  SKIN: no suspicious lesions or rashes  PSYCH: mentation appears normal, affect normal/bright          Assessment & Plan       ICD-10-CM    1. Gallstones K80.20 ursodiol (ACTIGALL) 250 MG tablet     GASTROENTEROLOGY ADULT REF CONSULT ONLY     Will give trial of actigall TID  Will refer to GI for further eval and  discussion  Aggressive weight loss       Follow-up in 3months  Villa Blake MD  [facility]

## 2019-12-19 ENCOUNTER — OFFICE VISIT (OUTPATIENT)
Dept: FAMILY MEDICINE | Facility: CLINIC | Age: 64
End: 2019-12-19
Payer: COMMERCIAL

## 2019-12-19 VITALS
DIASTOLIC BLOOD PRESSURE: 74 MMHG | HEART RATE: 91 BPM | RESPIRATION RATE: 16 BRPM | OXYGEN SATURATION: 97 % | SYSTOLIC BLOOD PRESSURE: 116 MMHG | BODY MASS INDEX: 39.06 KG/M2 | WEIGHT: 200 LBS | TEMPERATURE: 97.7 F

## 2019-12-19 DIAGNOSIS — K80.20 GALLSTONES: Primary | ICD-10-CM

## 2019-12-19 PROCEDURE — 99214 OFFICE O/P EST MOD 30 MIN: CPT | Performed by: FAMILY MEDICINE

## 2019-12-19 RX ORDER — URSODIOL 250 MG/1
250 TABLET, FILM COATED ORAL 3 TIMES DAILY
Qty: 90 TABLET | Refills: 3 | Status: SHIPPED | OUTPATIENT
Start: 2019-12-19 | End: 2020-09-14

## 2019-12-19 NOTE — PATIENT INSTRUCTIONS
Patient Education     Patient Education    Ursodiol Oral capsule    Ursodiol Oral tablet  Ursodiol Oral tablet  What is this medicine?  URSODIOL (ER mary dye ol) helps dissolve gallstones in patients who cannot have or who do not need gallbladder surgery. This medicine is also useful for certain liver diseases of adults, children and infants.  This medicine may be used for other purposes; ask your health care provider or pharmacist if you have questions.  What should I tell my health care provider before I take this medicine?  They need to know if you have any of these conditions:    blocked bile duct or fistula    pancreatitis    an unusual or allergic reaction to ursodiol, bile acids, other medicines, foods, dyes, or preservatives    pregnant or trying to get pregnant    breast-feeding  How should I use this medicine?  Take this medicine by mouth with a glass of water. Follow the directions on the prescription label. Take your doses at regular intervals. Do not take your medicine more often than directed. Do not stop taking except on the advice of your doctor or health care professional.  Talk to your pediatrician regarding the use of this medicine in children. Special care may be needed.  Overdosage: If you think you have taken too much of this medicine contact a poison control center or emergency room at once.  NOTE: This medicine is only for you. Do not share this medicine with others.  What if I miss a dose?  If you miss a dose, take it as soon as you can. If it is almost time for your next dose, take only that dose. Do not take double or extra doses.  What may interact with this medicine?    antacids    cholestyramine    clofibrate, fenofibrate, or gemfibrozil    colesevelam    colestipol    female hormones, including estrogens or birth control pills  This list may not describe all possible interactions. Give your health care provider a list of all the medicines, herbs, non-prescription drugs, or dietary  supplements you use. Also tell them if you smoke, drink alcohol, or use illegal drugs. Some items may interact with your medicine.  What should I watch for while using this medicine?  Visit your doctor or health care professional for regular checks on your progress. It may take months of therapy to get the right response. Your doctor or health care professional will schedule tests to see if your gallstones are dissolving or if your liver problem is improving. You may also need blood work done while you are taking this medicine to check that your liver is working properly. Report continued or worsened nausea, vomiting, or abdominal pain to your doctor.  Antacids may interfere with the absorption of this medicine. Take this medicine at least 1 hour before or 2 hours after an antacid dose.  What side effects may I notice from receiving this medicine?  Side effects that you should report to your doctor or health care professional as soon as possible:    allergic reactions like skin rash, itching or hives, swelling of the face, lips, or tongue    severe stomach area pain, especially toward your right side  Side effects that usually do not require medical attention (report to your doctor or health care professional if they continue or are bothersome):    constipation    gas    indigestion    nausea  This list may not describe all possible side effects. Call your doctor for medical advice about side effects. You may report side effects to FDA at 7-385-FDA-1947.  Where should I keep my medicine?  Keep out of the reach of children.  Store at room temperature between 15 and 30 degrees C (59 and 86 degrees F). Keep container tightly closed. Throw away any unused medicine after the expiration date.  NOTE:This sheet is a summary. It may not cover all possible information. If you have questions about this medicine, talk to your doctor, pharmacist, or health care provider. Copyright  2016 [employer]

## 2019-12-23 DIAGNOSIS — I10 HYPERTENSION GOAL BP (BLOOD PRESSURE) < 130/80: ICD-10-CM

## 2019-12-26 RX ORDER — LISINOPRIL 20 MG/1
TABLET ORAL
Qty: 45 TABLET | Refills: 0 | Status: SHIPPED | OUTPATIENT
Start: 2019-12-26 | End: 2020-02-28

## 2019-12-27 NOTE — TELEPHONE ENCOUNTER
Prescription approved per [facility] [place].  Yaritza Powers RN

## 2020-01-12 RX ORDER — ONDANSETRON 4 MG/1
4 TABLET, ORALLY DISINTEGRATING ORAL EVERY 6 HOURS PRN
Status: CANCELLED | OUTPATIENT
Start: 2020-01-12

## 2020-01-12 RX ORDER — LIDOCAINE 40 MG/G
CREAM TOPICAL
Status: CANCELLED | OUTPATIENT
Start: 2020-01-12

## 2020-01-12 RX ORDER — FLUMAZENIL 0.1 MG/ML
0.2 INJECTION, SOLUTION INTRAVENOUS
Status: CANCELLED | OUTPATIENT
Start: 2020-01-12 | End: 2020-01-13

## 2020-01-12 RX ORDER — ONDANSETRON 2 MG/ML
4 INJECTION INTRAMUSCULAR; INTRAVENOUS EVERY 6 HOURS PRN
Status: CANCELLED | OUTPATIENT
Start: 2020-01-12

## 2020-01-12 RX ORDER — NALOXONE HYDROCHLORIDE 0.4 MG/ML
.1-.4 INJECTION, SOLUTION INTRAMUSCULAR; INTRAVENOUS; SUBCUTANEOUS
Status: CANCELLED | OUTPATIENT
Start: 2020-01-12 | End: 2020-01-13

## 2020-01-12 RX ORDER — ONDANSETRON 2 MG/ML
4 INJECTION INTRAMUSCULAR; INTRAVENOUS
Status: CANCELLED | OUTPATIENT
Start: 2020-01-12

## 2020-01-27 ENCOUNTER — TRANSFERRED RECORDS (OUTPATIENT)
Dept: HEALTH INFORMATION MANAGEMENT | Facility: CLINIC | Age: 65
End: 2020-01-27

## 2020-02-11 ENCOUNTER — OFFICE VISIT (OUTPATIENT)
Dept: FAMILY MEDICINE | Facility: CLINIC | Age: 65
End: 2020-02-11
Payer: COMMERCIAL

## 2020-02-11 ENCOUNTER — TELEPHONE (OUTPATIENT)
Dept: FAMILY MEDICINE | Facility: CLINIC | Age: 65
End: 2020-02-11

## 2020-02-11 VITALS
HEART RATE: 84 BPM | DIASTOLIC BLOOD PRESSURE: 84 MMHG | OXYGEN SATURATION: 97 % | SYSTOLIC BLOOD PRESSURE: 116 MMHG | BODY MASS INDEX: 38.38 KG/M2 | TEMPERATURE: 97.8 F | WEIGHT: 195.5 LBS | HEIGHT: 60 IN | RESPIRATION RATE: 18 BRPM

## 2020-02-11 DIAGNOSIS — N76.0 VAGINITIS AND VULVOVAGINITIS: ICD-10-CM

## 2020-02-11 DIAGNOSIS — J01.00 ACUTE NON-RECURRENT MAXILLARY SINUSITIS: Primary | ICD-10-CM

## 2020-02-11 PROCEDURE — 99214 OFFICE O/P EST MOD 30 MIN: CPT | Performed by: FAMILY MEDICINE

## 2020-02-11 RX ORDER — FLUCONAZOLE 150 MG/1
150 TABLET ORAL ONCE
Qty: 1 TABLET | Refills: 0 | Status: SHIPPED | OUTPATIENT
Start: 2020-02-11 | End: 2020-02-11

## 2020-02-11 RX ORDER — FLUTICASONE PROPIONATE 50 MCG
1 SPRAY, SUSPENSION (ML) NASAL DAILY
Qty: 16 G | Refills: 1 | Status: SHIPPED | OUTPATIENT
Start: 2020-02-11 | End: 2020-09-11

## 2020-02-11 ASSESSMENT — ASTHMA QUESTIONNAIRES
QUESTION_5 LAST FOUR WEEKS HOW WOULD YOU RATE YOUR ASTHMA CONTROL: WELL CONTROLLED
QUESTION_3 LAST FOUR WEEKS HOW OFTEN DID YOUR ASTHMA SYMPTOMS (WHEEZING, COUGHING, SHORTNESS OF BREATH, CHEST TIGHTNESS OR PAIN) WAKE YOU UP AT NIGHT OR EARLIER THAN USUAL IN THE MORNING: NOT AT ALL
QUESTION_4 LAST FOUR WEEKS HOW OFTEN HAVE YOU USED YOUR RESCUE INHALER OR NEBULIZER MEDICATION (SUCH AS ALBUTEROL): ONCE A WEEK OR LESS
QUESTION_2 LAST FOUR WEEKS HOW OFTEN HAVE YOU HAD SHORTNESS OF BREATH: ONCE OR TWICE A WEEK
QUESTION_1 LAST FOUR WEEKS HOW MUCH OF THE TIME DID YOUR ASTHMA KEEP YOU FROM GETTING AS MUCH DONE AT WORK, SCHOOL OR AT HOME: NONE OF THE TIME
ACT_TOTALSCORE: 22

## 2020-02-11 ASSESSMENT — MIFFLIN-ST. JEOR: SCORE: 1368.28

## 2020-02-11 ASSESSMENT — PATIENT HEALTH QUESTIONNAIRE - PHQ9: SUM OF ALL RESPONSES TO PHQ QUESTIONS 1-9: 3

## 2020-02-11 NOTE — PROGRESS NOTES
Subjective     Farzana Hughes is a 64 year old female who presents to clinic today for the following health issues:    HPI   RESPIRATORY SYMPTOMS      Duration: Over 1 month    Description  nasal congestion, sore throat, facial pain/pressure, cough, headache, fatigue/malaise, hoarse voice and nausea    Severity: moderate    Accompanying signs and symptoms: None    History (predisposing factors):  asthma    Precipitating or alleviating factors: None    Therapies tried and outcome:  rest and fluids oral decongestant      Cough, headache, sinus pressure  Sore throat  Ear pain  Fever 101  Mucinex, nasal spray, nasal saline  Patient usually gets a vaginal yeast infection after taking antibiotics      BP Readings from Last 3 Encounters:   02/11/20 116/84   12/19/19 116/74   12/05/19 122/84    Wt Readings from Last 3 Encounters:   02/11/20 88.7 kg (195 lb 8 oz)   12/19/19 90.7 kg (200 lb)   12/05/19 89.6 kg (197 lb 8 oz)                      Reviewed and updated as needed this visit by Provider  Tobacco  Allergies  Meds  Problems  Med Hx  Surg Hx  Fam Hx         Review of Systems   ROS COMP: Constitutional, HEENT, cardiovascular, pulmonary, gi and gu systems are negative, except as otherwise noted.      Objective    /84   Pulse 84   Temp 97.8  F (36.6  C) (Oral)   Resp 18   Ht 1.524 m (5')   Wt 88.7 kg (195 lb 8 oz)   SpO2 97%   BMI 38.18 kg/m    Body mass index is 38.18 kg/m .  Physical Exam   GENERAL: healthy, alert and no distress  EYES: Eyes grossly normal to inspection, PERRL and conjunctivae and sclerae normal  HENT: frontal sinus tenderness, ear canals and TM's normal, nose and mouth without ulcers or lesions  NECK: no adenopathy, no asymmetry, masses, or scars and thyroid normal to palpation  RESP: lungs clear to auscultation - no rales, rhonchi or wheezes  CV: regular rate and rhythm, normal S1 S2, no S3 or S4, no murmur, click or rub, no peripheral edema and peripheral pulses strong  SKIN: no  suspicious lesions or rashes  PSYCH: mentation appears normal, affect normal/bright          Assessment & Plan       ICD-10-CM    1. Acute non-recurrent maxillary sinusitis J01.00 amoxicillin-clavulanate (AUGMENTIN) 875-125 MG tablet     fluticasone (FLONASE) 50 MCG/ACT nasal spray   2. Vaginitis and vulvovaginitis N76.0 fluconazole (DIFLUCAN) 150 MG tablet     Augmentin, flonase, sinus/saline rinse  Diflucan in anticipation for vaginitis        Follow up if symptoms worsen or fail to improve.   Villa Blake MD  [facility]

## 2020-02-11 NOTE — TELEPHONE ENCOUNTER
Spoke with pharmacy regarding cephalosporin allergy- noted this was specific to Keflex.  Pharmacy will dispense Augmentin as ordered.  Kathi Crow RN

## 2020-02-11 NOTE — TELEPHONE ENCOUNTER
Reason for call:  Other   Patient called regarding (reason for call): prescription  Additional comments: Pharmacy calling to discuss prescription for allergies that was sent today. Please call to advise.     Phone number to reach patient:  Other phone number:  [phone]    Best Time:  Any     Can we leave a detailed message on this number?  YES

## 2020-02-11 NOTE — PATIENT INSTRUCTIONS
Patient Education     Acute Bacterial Rhinosinusitis (ABRS)    Acute bacterial rhinosinusitis (ABRS) is an infection of your nasal cavity and sinuses. It s caused by bacteria. Acute means that you ve had symptoms for less than 4 weeks, but possibly up to 12 weeks.  Understanding your sinuses  The nasal cavity is the large air-filled space behind your nose. The sinuses are a group of spaces formed by the bones of your face. They connect with your nasal cavity. ABRS causes the tissue lining these spaces to become inflamed. Mucus may not drain normally. This leads to facial pain and other symptoms.  What causes ABRS?  ABRS most often follows an upper respiratory infection caused by a virus. Bacteria then infect the lining of your nasal cavity and sinuses. But you can also get ABRS if you have:    Nasal allergies    Long-term nasal swelling and congestion not caused by allergies    Blockage in the nose  Symptoms of ABRS  The symptoms of ABRS may be different for each person and include:    Nasal congestion or blockage    Pain or pressure in the face    Thick, colored drainage from the nose  Other symptoms may include:    Runny nose    Fluid draining from the nose down the throat (postnasal drip)    Headache    Cough    Pain    Fever  Diagnosing ABRS  ABRS may be diagnosed if you ve had an upper respiratory infection like a cold and cough for 10 or more days without improvement or with worsening symptoms. Your healthcare provider will ask about your symptoms and your medical history. The provider will check your vital signs, including your temperature. You ll have a physical exam. The healthcare provider will check your ears, nose, and throat. You likely won t need any tests. If ABRS comes back, you may have a culture or other tests.  Treatment for ABRS  Treatment may include:    Antibiotic medicine. This is for symptoms that last for at least 10 to 14 days.    Nasal corticosteroid medicine. Drops or spray used in the  nose can lessen swelling and congestion.    Over-the-counter pain medicine. This is to lessen sinus pain and pressure.    Nasal decongestant medicine. Spray or drops may help to lessen congestion. Do not use them for more than a few days.    Salt wash (saline irrigation). This can help to loosen mucus.  Possible complications of ABRS  ABRS may come back or become long-term (chronic). In rare cases, ABRS may cause complications such as:     Inflamed tissue around the brain and spinal cord (meningitis)    Inflamed tissue around the eyes (orbital cellulitis)    Inflamed bones around the sinuses (osteitis)  These problems may need to be treated in a hospital with intravenous (IV) antibiotic medicine or surgery.  When to call the healthcare provider  Call your healthcare provider if you have any of the following:    Symptoms that don t get better, or get worse    Symptoms that don t get better after 3 to 5 days on antibiotics    Trouble seeing    Swelling around your eyes    Confusion or trouble staying awake   Date Last Reviewed: 5/1/2017 2000-2019 [employer]. [address] [place] [address], [place] [address]. All rights reserved. This information is not intended as a substitute for professional medical care. Always follow your healthcare professional's instructions.

## 2020-02-12 ASSESSMENT — ASTHMA QUESTIONNAIRES: ACT_TOTALSCORE: 22

## 2020-02-20 DIAGNOSIS — J45.40 MODERATE PERSISTENT ASTHMA, UNCOMPLICATED: ICD-10-CM

## 2020-02-20 NOTE — TELEPHONE ENCOUNTER
Reason for call:  Medication   If this is a refill request, has the caller requested the refill from the pharmacy already? Yes  Will the patient be using a [facility]? No  Name of the pharmacy and phone number for the current request: [place] [id number]  [phone]    Name of the medication requested: Singulair    Other request: Patient states she is out of medication, please call.     Phone number to reach patient:  Home number on file [phone] (home)    Best Time:  any    Can we leave a detailed message on this number?  YES    Travel screening: Not Applicable

## 2020-02-24 RX ORDER — MONTELUKAST SODIUM 10 MG/1
TABLET ORAL
Qty: 90 TABLET | Refills: 1 | Status: SHIPPED | OUTPATIENT
Start: 2020-02-24 | End: 2020-09-30

## 2020-02-26 ENCOUNTER — TRANSFERRED RECORDS (OUTPATIENT)
Dept: HEALTH INFORMATION MANAGEMENT | Facility: CLINIC | Age: 65
End: 2020-02-26

## 2020-02-27 DIAGNOSIS — E78.5 HYPERLIPIDEMIA LDL GOAL <130: ICD-10-CM

## 2020-02-27 DIAGNOSIS — I10 HYPERTENSION GOAL BP (BLOOD PRESSURE) < 130/80: ICD-10-CM

## 2020-02-27 RX ORDER — LISINOPRIL 20 MG/1
TABLET ORAL
Qty: 45 TABLET | Refills: 0 | Status: CANCELLED | OUTPATIENT
Start: 2020-02-27

## 2020-02-27 NOTE — TELEPHONE ENCOUNTER
Disp Refills Start End ROSELINE   lisinopril (PRINIVIL/ZESTRIL) 20 MG tablet 45 tablet 0 12/26/2019  No   Sig: TAKE HALF TABLET BY MOUTH DAILY   Sent to pharmacy as: lisinopril (PRINIVIL/ZESTRIL) 20 MG tablet   Class: E-Prescribe   Order: [id number]   E-Prescribing Status: Receipt confirmed by pharmacy (12/26/2019  9:41 PM CST)      Disp Refills Start End ROSELINE   atorvastatin (LIPITOR) 40 MG tablet 90 tablet 0 6/28/2019  No   Sig - Route: Take 1 tablet (40 mg) by mouth daily Generic please - Oral   Sent to pharmacy as: atorvastatin (LIPITOR) 40 MG tablet   Class: E-Prescribe   Order: [id number]   E-Prescribing Status: Receipt confirmed by pharmacy (6/28/2019  9:23 AM CDT)           Sakshi Anthony MA on 2/27/2020 at 8:46 AM

## 2020-02-28 RX ORDER — LISINOPRIL 20 MG/1
TABLET ORAL
Qty: 45 TABLET | Refills: 0 | Status: SHIPPED | OUTPATIENT
Start: 2020-02-28 | End: 2020-04-08

## 2020-02-28 RX ORDER — ATORVASTATIN CALCIUM 40 MG/1
40 TABLET, FILM COATED ORAL DAILY
Qty: 90 TABLET | Refills: 1 | Status: SHIPPED | OUTPATIENT
Start: 2020-02-28 | End: 2020-04-08

## 2020-03-31 ENCOUNTER — TELEPHONE (OUTPATIENT)
Dept: FAMILY MEDICINE | Facility: CLINIC | Age: 65
End: 2020-03-31

## 2020-03-31 DIAGNOSIS — E11.9 TYPE 2 DIABETES, DIET CONTROLLED (H): ICD-10-CM

## 2020-03-31 NOTE — TELEPHONE ENCOUNTER
Prescriptions sent to pharmacy per [facility] protocol.  Spoke with patient and advised prescription was sent.   Kathi Crow RN

## 2020-03-31 NOTE — TELEPHONE ENCOUNTER
Patient reports she only needs the test strips, she has a meter (was her husbands).  Called pharmacy and advised to only fill the test strips no meter is needed.  Pharmacy will process order for test strips.   Kathi Crow RN

## 2020-03-31 NOTE — TELEPHONE ENCOUNTER
Reason for call:  Other   Patient called regarding (reason for call): prescription and  Freestyle jesse glucose machine     [facility]    Additional comments:  na    Phone number to reach patient:  Home number on file [phone] (home)    Best Time:   Any     Can we leave a detailed message on this number?  YES    Travel screening: Not Applicable

## 2020-03-31 NOTE — TELEPHONE ENCOUNTER
Reason for call:  Other   Patient called regarding (reason for call): call back  Additional comments: [facility] pharmacy is calling with some questions about the medication prescribed. Please call back to discuss.    Phone number to reach patient:  Other phone number:  [phone]    Best Time:  any    Can we leave a detailed message on this number?  YES    Travel screening: Negative

## 2020-04-07 DIAGNOSIS — I10 HYPERTENSION GOAL BP (BLOOD PRESSURE) < 130/80: ICD-10-CM

## 2020-04-07 DIAGNOSIS — K21.9 GASTROESOPHAGEAL REFLUX DISEASE WITHOUT ESOPHAGITIS: ICD-10-CM

## 2020-04-07 DIAGNOSIS — E78.5 HYPERLIPIDEMIA LDL GOAL <130: ICD-10-CM

## 2020-04-08 RX ORDER — LISINOPRIL 20 MG/1
TABLET ORAL
Qty: 45 TABLET | Refills: 1 | Status: SHIPPED | OUTPATIENT
Start: 2020-04-08 | End: 2020-09-30

## 2020-04-08 RX ORDER — OMEPRAZOLE 40 MG/1
CAPSULE, DELAYED RELEASE ORAL
Qty: 90 CAPSULE | Refills: 1 | Status: SHIPPED | OUTPATIENT
Start: 2020-04-08 | End: 2021-03-02

## 2020-04-08 RX ORDER — ATORVASTATIN CALCIUM 40 MG/1
40 TABLET, FILM COATED ORAL DAILY
Qty: 90 TABLET | Refills: 0 | Status: SHIPPED | OUTPATIENT
Start: 2020-04-08 | End: 2021-02-19

## 2020-04-15 DIAGNOSIS — L29.9 ITCHING: ICD-10-CM

## 2020-04-15 DIAGNOSIS — T14.8XXA EXCORIATION: ICD-10-CM

## 2020-04-16 RX ORDER — DOXEPIN HYDROCHLORIDE 10 MG/1
10 CAPSULE ORAL AT BEDTIME
Qty: 90 CAPSULE | Refills: 2 | Status: SHIPPED | OUTPATIENT
Start: 2020-04-16 | End: 2021-03-02

## 2020-07-06 ENCOUNTER — TRANSFERRED RECORDS (OUTPATIENT)
Dept: HEALTH INFORMATION MANAGEMENT | Facility: CLINIC | Age: 65
End: 2020-07-06

## 2020-07-23 ENCOUNTER — TELEPHONE (OUTPATIENT)
Dept: FAMILY MEDICINE | Facility: CLINIC | Age: 65
End: 2020-07-23

## 2020-07-23 DIAGNOSIS — E78.5 HYPERLIPIDEMIA LDL GOAL <100: ICD-10-CM

## 2020-07-23 DIAGNOSIS — E11.9 TYPE 2 DIABETES, DIET CONTROLLED (H): Primary | ICD-10-CM

## 2020-07-23 NOTE — TELEPHONE ENCOUNTER
Would provider recommend labwork followed by virtual visit?  If so, please order any needed labs    Trinh Her RN

## 2020-07-23 NOTE — TELEPHONE ENCOUNTER
Reason for call:  Symptom   Symptom or request: Blood sugars running 150-210    Duration (how long have symptoms been present): last 2 weeks  Have you been treated for this before? No    Additional comments: Patient has questions on her blood sugars and if she should come in to be seen or not? Please call to advise.    Phone number to reach patient:  Cell number on file:    Telephone Information:   Mobile [phone]       Best Time:  any    Can we leave a detailed message on this number?  YES    Travel screening: Not Applicable

## 2020-07-28 NOTE — TELEPHONE ENCOUNTER
Lab orders placed. Please call to schedule lab only appointment and then a virtual or in-person visit to discuss results/treatment.     Manda Galicia RN

## 2020-07-28 NOTE — TELEPHONE ENCOUNTER
Called and left patient VM to return call to schedule lab only appointment and then a virtual or in-person visit to discuss results/treatment.   Fidel Whiting CMA on 7/28/2020 at 8:17 AM

## 2020-07-31 ENCOUNTER — OFFICE VISIT (OUTPATIENT)
Dept: FAMILY MEDICINE | Facility: CLINIC | Age: 65
End: 2020-07-31
Payer: COMMERCIAL

## 2020-07-31 VITALS
HEART RATE: 89 BPM | BODY MASS INDEX: 38.51 KG/M2 | RESPIRATION RATE: 16 BRPM | WEIGHT: 197.2 LBS | TEMPERATURE: 98.2 F | DIASTOLIC BLOOD PRESSURE: 77 MMHG | OXYGEN SATURATION: 95 % | SYSTOLIC BLOOD PRESSURE: 139 MMHG

## 2020-07-31 DIAGNOSIS — E11.9 TYPE 2 DIABETES MELLITUS WITHOUT COMPLICATION, WITHOUT LONG-TERM CURRENT USE OF INSULIN (H): Primary | ICD-10-CM

## 2020-07-31 LAB
ALBUMIN SERPL-MCNC: 3.8 G/DL (ref 3.4–5)
ALP SERPL-CCNC: 128 U/L (ref 40–150)
ALT SERPL W P-5'-P-CCNC: 44 U/L (ref 0–50)
ANION GAP SERPL CALCULATED.3IONS-SCNC: 5 MMOL/L (ref 3–14)
AST SERPL W P-5'-P-CCNC: 23 U/L (ref 0–45)
BILIRUB SERPL-MCNC: 0.3 MG/DL (ref 0.2–1.3)
BUN SERPL-MCNC: 17 MG/DL (ref 7–30)
CALCIUM SERPL-MCNC: 8.9 MG/DL (ref 8.5–10.1)
CHLORIDE SERPL-SCNC: 112 MMOL/L (ref 94–109)
CHOLEST SERPL-MCNC: 176 MG/DL
CO2 SERPL-SCNC: 25 MMOL/L (ref 20–32)
CREAT SERPL-MCNC: 0.94 MG/DL (ref 0.52–1.04)
GFR SERPL CREATININE-BSD FRML MDRD: 64 ML/MIN/{1.73_M2}
GLUCOSE SERPL-MCNC: 138 MG/DL (ref 70–99)
HBA1C MFR BLD: 6.6 % (ref 0–5.6)
HDLC SERPL-MCNC: 49 MG/DL
LDLC SERPL CALC-MCNC: 94 MG/DL
NONHDLC SERPL-MCNC: 127 MG/DL
POTASSIUM SERPL-SCNC: 4.1 MMOL/L (ref 3.4–5.3)
PROT SERPL-MCNC: 7.7 G/DL (ref 6.8–8.8)
SODIUM SERPL-SCNC: 142 MMOL/L (ref 133–144)
TRIGL SERPL-MCNC: 164 MG/DL

## 2020-07-31 PROCEDURE — 80053 COMPREHEN METABOLIC PANEL: CPT | Performed by: FAMILY MEDICINE

## 2020-07-31 PROCEDURE — 36415 COLL VENOUS BLD VENIPUNCTURE: CPT | Performed by: FAMILY MEDICINE

## 2020-07-31 PROCEDURE — 80061 LIPID PANEL: CPT | Performed by: FAMILY MEDICINE

## 2020-07-31 PROCEDURE — 99214 OFFICE O/P EST MOD 30 MIN: CPT | Performed by: FAMILY MEDICINE

## 2020-07-31 PROCEDURE — 83036 HEMOGLOBIN GLYCOSYLATED A1C: CPT | Performed by: FAMILY MEDICINE

## 2020-07-31 RX ORDER — METFORMIN HCL 500 MG
500 TABLET, EXTENDED RELEASE 24 HR ORAL
Qty: 30 TABLET | Refills: 2 | Status: SHIPPED | OUTPATIENT
Start: 2020-07-31 | End: 2020-08-14

## 2020-07-31 NOTE — PROGRESS NOTES
Subjective     Farzana Hughes is a 64 year old female who presents to clinic today for the following health issues:    HPI       Diabetes Follow-up    How often are you checking your blood sugar? A few times a week  What time of day are you checking your blood sugars (select all that apply)?  Before and after meals  Have you had any blood sugars above 200?  Yes 150-210  Have you had any blood sugars below 70?  Yes     What symptoms do you notice when your blood sugar is low?  Dizzy and lightheadachness    What concerns do you have today about your diabetes? Blood sugar is often over 200     Do you have any of these symptoms? (Select all that apply)  Weight gain      BP Readings from Last 2 Encounters:   07/31/20 139/77   02/11/20 116/84     Hemoglobin A1C (%)   Date Value   07/31/2020 6.6 (H)   12/05/2019 6.4 (H)     LDL Cholesterol Calculated (mg/dL)   Date Value   07/31/2020 94   07/30/2019 87         Hyperlipidemia Follow-Up      Are you regularly taking any medication or supplement to lower your cholesterol?   Yes- .    Are you having muscle aches or other side effects that you think could be caused by your cholesterol lowering medication?  No    Hypertension Follow-up      Do you check your blood pressure regularly outside of the clinic? Yes     Are you following a low salt diet? No    Are your blood pressures ever more than 140 on the top number (systolic) OR more   than 90 on the bottom number (diastolic), for example 140/90? No      How many servings of fruits and vegetables do you eat daily?  0-1    On average, how many sweetened beverages do you drink each day (Examples: soda, juice, sweet tea, etc.  Do NOT count diet or artificially sweetened beverages)?   0    How many days per week do you exercise enough to make your heart beat faster? none    How many minutes a day do you exercise enough to make your heart beat faster? none    How many days per week do you miss taking your medication? 0    Elevated  fasting blood sugars  A1C is 6.6  Feels sluggish  Would like to start medication    Reviewed and updated as needed this visit by Provider  Tobacco  Allergies  Meds  Problems  Med Hx  Surg Hx  Fam Hx         Review of Systems   Constitutional, HEENT, cardiovascular, pulmonary, gi and gu systems are negative, except as otherwise noted.      Objective    /77   Pulse 89   Temp 98.2  F (36.8  C) (Oral)   Resp 16   Wt 89.4 kg (197 lb 3.2 oz)   SpO2 95%   BMI 38.51 kg/m    Body mass index is 38.51 kg/m .  Physical Exam   GENERAL: healthy, alert and no distress  EYES: Eyes grossly normal to inspection, PERRL and conjunctivae and sclerae normal  NECK: no adenopathy, no asymmetry, masses, or scars and thyroid normal to palpation  RESP: lungs clear to auscultation - no rales, rhonchi or wheezes  CV: regular rate and rhythm, normal S1 S2, no S3 or S4, no murmur, click or rub, no peripheral edema and peripheral pulses strong  SKIN: no suspicious lesions or rashes  PSYCH: mentation appears normal, affect normal/bright            Assessment & Plan       ICD-10-CM    1. Type 2 diabetes mellitus without complication, without long-term current use of insulin (H)  E11.9 HEMOGLOBIN A1C     Lipid panel reflex to direct LDL Fasting     metFORMIN (GLUCOPHAGE-XR) 500 MG 24 hr tablet     Comprehensive metabolic panel (BMP + Alb, Alk Phos, ALT, AST, Total. Bili, TP)     Albumin Random Urine Quantitative with Creat Ratio     CANCELED: Albumin Random Urine Quantitative with Creat Ratio        Start metformin  Continue checking glucose daily    Return in about 2 weeks (around 8/14/2020) for Diabetes.    Villa Blake MD  [facility]

## 2020-08-01 ASSESSMENT — ASTHMA QUESTIONNAIRES: ACT_TOTALSCORE: 22

## 2020-08-04 ENCOUNTER — TELEPHONE (OUTPATIENT)
Dept: FAMILY MEDICINE | Facility: CLINIC | Age: 65
End: 2020-08-04

## 2020-08-04 NOTE — TELEPHONE ENCOUNTER
Spoke with pt. States she took her first dose of Metformin  2 days ago. Took at 6:30 by 8:30 got an upset stomach. Was gagging and vomiting. 30 min later it stopped. All day yesterday was sick to her stomach so did not take another dose. Still has an upset stomach this am. Can she cut it in half and try half of a strength to see what it does? Please advise.    Jimena Ordoñez RN  [facility]

## 2020-08-04 NOTE — TELEPHONE ENCOUNTER
Reason for call:  Other   Patient called regarding (reason for call): prescription  Additional comments:  Patient calling. She started taking metformin. It makes her vomit. Can she cut the dose in half? Please call and advise.     Phone number to reach patient:  Home number on file [phone] (home)    Best Time:   Any     Can we leave a detailed message on this number?  YES    Travel screening: Not Applicable

## 2020-08-04 NOTE — TELEPHONE ENCOUNTER
Called patient. Notified her of reply from provider. She verbalized understanding and states that she will try cutting her dose in half and will call us back in a couple days with an update.

## 2020-08-14 ENCOUNTER — OFFICE VISIT (OUTPATIENT)
Dept: FAMILY MEDICINE | Facility: CLINIC | Age: 65
End: 2020-08-14
Payer: COMMERCIAL

## 2020-08-14 VITALS
WEIGHT: 195.5 LBS | HEART RATE: 84 BPM | BODY MASS INDEX: 38.38 KG/M2 | SYSTOLIC BLOOD PRESSURE: 136 MMHG | DIASTOLIC BLOOD PRESSURE: 88 MMHG | HEIGHT: 60 IN | RESPIRATION RATE: 16 BRPM | TEMPERATURE: 98.7 F | OXYGEN SATURATION: 96 %

## 2020-08-14 DIAGNOSIS — E11.649 UNCONTROLLED TYPE 2 DIABETES MELLITUS WITH HYPOGLYCEMIA WITHOUT COMA (H): Primary | ICD-10-CM

## 2020-08-14 PROCEDURE — 99214 OFFICE O/P EST MOD 30 MIN: CPT | Performed by: FAMILY MEDICINE

## 2020-08-14 ASSESSMENT — MIFFLIN-ST. JEOR: SCORE: 1368.28

## 2020-08-14 NOTE — PROGRESS NOTES
Subjective     Farzana Hughes is a 64 year old female who presents to clinic today for the following health issues:    HPI       Chief Complaint   Patient presents with     RECHECK     2 week follow up     Elevated A1C and home glucose readings  Patient is asymptomatic  A1c is 6.6  Sedentary, attributes to pandemic  Unhealthy diet    Reviewed and updated as needed this visit by Provider  Tobacco  Allergies  Meds  Problems  Med Hx  Surg Hx  Fam Hx         Review of Systems   Constitutional, HEENT, cardiovascular, pulmonary, gi and gu systems are negative, except as otherwise noted.      Objective    /88   Pulse 84   Temp 98.7  F (37.1  C) (Oral)   Resp 16   Ht 1.524 m (5')   Wt 88.7 kg (195 lb 8 oz)   SpO2 96%   BMI 38.18 kg/m    Body mass index is 38.18 kg/m .  Physical Exam   GENERAL: healthy, alert and no distress  EYES: Eyes grossly normal to inspection, PERRL and conjunctivae and sclerae normal  NECK: no adenopathy, no asymmetry, masses, or scars and thyroid normal to palpation  RESP: lungs clear to auscultation - no rales, rhonchi or wheezes  CV: regular rate and rhythm, normal S1 S2, no S3 or S4, no murmur, click or rub, no peripheral edema and peripheral pulses strong  SKIN: no suspicious lesions or rashes  PSYCH: mentation appears normal, affect normal/bright          Assessment & Plan       ICD-10-CM    1. Uncontrolled type 2 diabetes mellitus with hypoglycemia without coma (H)  E11.649 metFORMIN (GLUCOPHAGE) 500 MG tablet                Return in about 3 months (around 11/14/2020) for Diabetes.    Villa Blake MD  [facility]

## 2020-08-27 DIAGNOSIS — Z91.013 SHELLFISH ALLERGY: ICD-10-CM

## 2020-08-27 RX ORDER — EPINEPHRINE 0.3 MG/.3ML
0.3 INJECTION SUBCUTANEOUS PRN
Qty: 2 EACH | Refills: 0 | Status: SHIPPED | OUTPATIENT
Start: 2020-08-27

## 2020-08-31 ENCOUNTER — TELEPHONE (OUTPATIENT)
Dept: FAMILY MEDICINE | Facility: CLINIC | Age: 65
End: 2020-08-31

## 2020-08-31 DIAGNOSIS — E11.9 TYPE 2 DIABETES MELLITUS WITHOUT COMPLICATION, WITHOUT LONG-TERM CURRENT USE OF INSULIN (H): Primary | ICD-10-CM

## 2020-08-31 NOTE — TELEPHONE ENCOUNTER
Reason for Call:  Other prescription    Detailed comments: Patient wants to know if she can get an alternative to metFORMIN (GLUCOPHAGE) 500 MG tablet due to side effects of upset stomach and diarrhea?       Phone Number Patient can be reached at: Cell number on file:    Telephone Information:   Mobile [phone]       Best Time: any    Can we leave a detailed message on this number? YES    Call taken on 8/31/2020 at 2:40 PM by Mary Zimmer

## 2020-08-31 NOTE — TELEPHONE ENCOUNTER
Please see message below and advise. Pt currently has immediate release and is taking 2 tablets daily. She is having diarrhea and upset stomach. States that she thinks that she might have tolerated the XR better, but was cutting tablet in half.

## 2020-09-01 NOTE — TELEPHONE ENCOUNTER
Patient called RN hotline. Notified her of message from provider. She verbalized understanding. States that she cannot take scheduling number because she is bringing  to work and does not have pen/paper. Notified her that they should be calling her to help get appointment scheduled. If she does not hear from them, then call us back for scheduling number.

## 2020-09-01 NOTE — TELEPHONE ENCOUNTER
Referral to MTM.  We're avoiding XR 2/2 recent discovery of cancer risk.  Not the case with short acting.    Villa Blake MD

## 2020-09-03 ENCOUNTER — TELEPHONE (OUTPATIENT)
Dept: FAMILY MEDICINE | Facility: CLINIC | Age: 65
End: 2020-09-03

## 2020-09-03 NOTE — TELEPHONE ENCOUNTER
MTM referral from: [facility] visit (referral by provider)    MTM referral outreach attempt #2 on September 3, 2020 at 2:55 PM      Outcome: Patient not reachable after several attempts, will route to MTM Pharmacist/Provider as an FYI. Thank you for the referral.    Fabby Zhu, MTM Coordinator

## 2020-09-08 NOTE — NURSING NOTE
Patient refused to do PHQ9. Mariajose Guerrier MA     Tranexamic Acid Pregnancy And Lactation Text: It is unknown if this medication is safe during pregnancy or breast feeding.

## 2020-09-11 ENCOUNTER — VIRTUAL VISIT (OUTPATIENT)
Dept: ALLERGY | Facility: CLINIC | Age: 65
End: 2020-09-11
Payer: COMMERCIAL

## 2020-09-11 VITALS — WEIGHT: 195 LBS | HEIGHT: 60 IN | BODY MASS INDEX: 38.28 KG/M2

## 2020-09-11 DIAGNOSIS — R22.0 FACIAL SWELLING: ICD-10-CM

## 2020-09-11 DIAGNOSIS — T63.91XD TOXIC EFFECT OF VENOM, ACCIDENTAL OR UNINTENTIONAL, SUBSEQUENT ENCOUNTER: Primary | ICD-10-CM

## 2020-09-11 DIAGNOSIS — J45.40 MODERATE PERSISTENT ASTHMA WITHOUT COMPLICATION: ICD-10-CM

## 2020-09-11 DIAGNOSIS — J31.0 NON-ALLERGIC RHINITIS: ICD-10-CM

## 2020-09-11 DIAGNOSIS — Z91.013 SHELLFISH ALLERGY: ICD-10-CM

## 2020-09-11 PROCEDURE — 99214 OFFICE O/P EST MOD 30 MIN: CPT | Mod: 95 | Performed by: ALLERGY & IMMUNOLOGY

## 2020-09-11 ASSESSMENT — MIFFLIN-ST. JEOR: SCORE: 1361.01

## 2020-09-11 NOTE — ASSESSMENT & PLAN NOTE
Blood testing recently negative for shellfish.  However, she reports that reproducibly and recently after consuming shellfish she has had tightness in throat and chest.  She declined to proceed with an oral food challenge secondary to the symptoms.  Currently not carry injectable epinephrine.     Shellfish blood testing was negative.  We had discussed obtaining skin testing.   She will continue to avoid shellfish and carry injectable epinephrine for now.  Updated anaphylaxis action plan provided.

## 2020-09-11 NOTE — ASSESSMENT & PLAN NOTE
Patient reports angioedema of extremities she was stung and plus shortness of breath and almost passing out after venom sting approximately 20 years ago.    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  - Follow anaphylaxis action plan.  She has injectable epinephrine.

## 2020-09-11 NOTE — PATIENT INSTRUCTIONS
Allergy Staff Appt Hours Shot Hours Locations    Physician     Murali Logan DO       Support Staff     TEJ Hensley, MADELAINE  Tuesday:        [place] 7-4:20     Wednesday:        [place]: 7-5     Thursday:                    [place] 7-6:40     Friday:  [place]  7-2:40   [place]        Thursday: 1-5:50        Friday: 7-10:50     [place]        Tuesday: 7- 3:20        Wednesday: 7-4:20     [place] Monday: 7-4:20        Tuesday: 1-6:20         [facility]  [address] [place] [address]  Appt Line: [phone]  Allergy RN:  [phone]    [facility]  [address] [place] [address]  Appt Line: [phone]  Allergy RN:  [phone]       Important Scheduling Information  Aspirin Desensitization: Appt will last 2 clinic days. Please call the Allergy RN line for your clinic to schedule. Discontinue antihistamines 7 days prior to the appointment.     Food Challenges: Appt will last 3-4 hours. Please call the Allergy RN line for your clinic to schedule. Discontinue antihistamines 7 days prior to the appointment.     Penicillin Testing: Appt will last 2-3 hours. Please call the Allergy RN line for your clinic to schedule. Discontinue antihistamines 7 days prior to the appointment.     Skin Testing: Appt will about 40 minutes. Call the appointment line for your clinic to schedule. Discontinue antihistamines 7 days prior to the appointment.     Venom Testing: Appt will last 2-3 hours. Please call the Allergy RN line for your clinic to schedule. Discontinue antihistamines 7 days prior to the appointment.     Thank you for trusting us with your Allergy, Asthma, and Immunology care. Please feel free to contact us with any questions or concerns you may have.
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
## 2020-09-11 NOTE — ASSESSMENT & PLAN NOTE
Patient reports history of facial swelling.  When swelling occurs it will persist 1 month.  She reports that she currently has it on left side.  Per video visit I do not appreciate any swelling.  She denies swelling of her lips or tongue.  She is on lisinopril.  Discussed that she have a discussion with her primary care doctor about potentially discontinuing lisinopril to ascertain if helps with her symptoms.

## 2020-09-11 NOTE — ASSESSMENT & PLAN NOTE
Negative allergy testing.    CT scan of sinuses showed sinusitis.  Treated with doxycycline.  She had significant improvement in sinus pressure and colored mucus.    Currently asymptomatic.
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
## 2020-09-11 NOTE — ASSESSMENT & PLAN NOTE
History of asthma.  Asthma has been well controlled.  Asthma flares with chemical/perfume exposure.  She additionally has problems with exertion.      ACT 25     - Albuterol 2-4 puffs inhaled (use spacer if not using Proair Respiclick device) 15-20 minutes prior to physical activity.   Please ensure warm up period prior to exercise.   - Albuterol 2-4 puffs inhaled (use a spacer unless using a Proair Respiclick device) every 4 hours as needed for chest tightness, wheezing, shortness of breath and/or coughing.   - Avoid asthma triggers.  - Singulair 10mg by mouth daily at night.

## 2020-09-11 NOTE — LETTER
[facility]                   FOOD ALLERGY & ANAPHYLAXIS EMERGENCY CARE PLAN  Food [employer] Education         Name: Farzana BLEDSOEO.B.:  [id number]    Allergy to: Shellfish  Weight: 195 lbs 0 oz lbs.  Asthma:  Yes  (higher risk for a severe reaction)    -NOTE: Do not depend on antihistamines or inhalers (bronchodilators) to treat a severe reaction. USE EPINEPHRINE.     MEDICATIONS/DOSES  Epinephrine Brand: EpiPen  Epinephrine Dose: 0.3 mg IM  Antihistamine Brand or Generic: Zyrtec (Cetirizine)  Antihistamine Dose: 10mg  Other (e.g., inhaler-bronchodilator if wheezing): albuterol       FARE                   FOOD ALLERGY & ANAPHYLAXIS EMERGENCY CARE PLAN   Food [employer]ergy [employer] Education           EMERGENCY CONTACTS - CALL 911  DOCTOR:  Murali Logan DO   PHONE: [phone]  PARENT/GUARDIAN:              PHONE:  OTHER EMERGENCY CONTACTS  NAME/RELATIONSHIP:   PHONE:   NAME/RELATIONSHIP:    PHONE:           PARENT/GUARDIAN AUTHORIZATION SIGNATURE     DATE              PHYSICIAN/H CP AUTHORIZATION SIGNATURE         DATE  FORM PROVIDED COURTESY OF FOOD ALLERGY RESE[employer] & EDUCATION (FARE) ([email or web address]) 2014

## 2020-09-12 ASSESSMENT — ASTHMA QUESTIONNAIRES: ACT_TOTALSCORE: 25

## 2020-09-14 ENCOUNTER — VIRTUAL VISIT (OUTPATIENT)
Dept: PHARMACY | Facility: CLINIC | Age: 65
End: 2020-09-14
Attending: FAMILY MEDICINE
Payer: COMMERCIAL

## 2020-09-14 DIAGNOSIS — I10 BENIGN ESSENTIAL HYPERTENSION: ICD-10-CM

## 2020-09-14 DIAGNOSIS — E11.9 TYPE 2 DIABETES, DIET CONTROLLED (H): Primary | ICD-10-CM

## 2020-09-14 DIAGNOSIS — G25.81 RESTLESS LEGS SYNDROME (RLS): ICD-10-CM

## 2020-09-14 DIAGNOSIS — Z23 ENCOUNTER FOR IMMUNIZATION: ICD-10-CM

## 2020-09-14 DIAGNOSIS — G43.909 MIGRAINE HEADACHE: ICD-10-CM

## 2020-09-14 DIAGNOSIS — K29.00 ACUTE GASTRITIS WITHOUT HEMORRHAGE, UNSPECIFIED GASTRITIS TYPE: ICD-10-CM

## 2020-09-14 PROCEDURE — 99607 MTMS BY PHARM ADDL 15 MIN: CPT | Mod: TEL | Performed by: PHARMACIST

## 2020-09-14 PROCEDURE — 99605 MTMS BY PHARM NP 15 MIN: CPT | Mod: TEL | Performed by: PHARMACIST

## 2020-09-14 RX ORDER — METFORMIN HCL 500 MG
250 TABLET, EXTENDED RELEASE 24 HR ORAL
COMMUNITY
End: 2020-10-15

## 2020-09-14 RX ORDER — GABAPENTIN 300 MG/1
300 CAPSULE ORAL AT BEDTIME
COMMUNITY
End: 2021-11-22

## 2020-09-14 RX ORDER — GABAPENTIN 100 MG/1
200 CAPSULE ORAL DAILY
COMMUNITY
End: 2022-01-26

## 2020-09-14 RX ORDER — METFORMIN HCL 500 MG
250 TABLET, EXTENDED RELEASE 24 HR ORAL
Qty: 360 TABLET | Status: CANCELLED | OUTPATIENT
Start: 2020-09-14

## 2020-09-14 RX ORDER — TOPIRAMATE 100 MG/1
100 CAPSULE, EXTENDED RELEASE ORAL DAILY
COMMUNITY
End: 2021-04-15

## 2020-09-14 RX ORDER — FLUTICASONE PROPIONATE 50 MCG
1-2 SPRAY, SUSPENSION (ML) NASAL DAILY PRN
COMMUNITY
End: 2020-10-12

## 2020-09-14 NOTE — Clinical Note
JOANN LEOS note, thanks!    Pamella Roland, PharmD  Medication Therapy Management Pharmacist  [phone]
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
## 2020-09-15 NOTE — PATIENT INSTRUCTIONS
Recommendations from today's MTM visit:                                                        1. Stop metformin 500 mg regular release.    2. Restart metformin  mg once daily.    3. Check blood sugars fasting, goal at this time is  mg/dL and 2 hours after supper, goal at this time is < 180 mg/dL, or even < 150 mg/dL.     4. There is an egg-free flu shot that I would recommend you get called FluSproom.    It was great to speak with you today.  I value your experience and would be very thankful for your time with providing feedback on our clinic survey. You may receive a survey via email or text message in the next few days.     Next MTM visit: 2 weeks    To schedule another MTM appointment, please call the clinic directly or you may call the MTM scheduling line at [phone] or toll-free at [phone].     My Clinical Pharmacist's contact information:                                                      It was a pleasure talking with you today!  Please feel free to contact me with any questions or concerns you have.      Pamella Roland, PharmD  Medication Therapy Management Pharmacist  [phone]

## 2020-09-28 ENCOUNTER — VIRTUAL VISIT (OUTPATIENT)
Dept: PHARMACY | Facility: CLINIC | Age: 65
End: 2020-09-28
Payer: COMMERCIAL

## 2020-09-28 DIAGNOSIS — E11.9 TYPE 2 DIABETES, DIET CONTROLLED (H): Primary | ICD-10-CM

## 2020-09-28 PROCEDURE — 99607 MTMS BY PHARM ADDL 15 MIN: CPT | Mod: TEL | Performed by: PHARMACIST

## 2020-09-28 PROCEDURE — 99606 MTMS BY PHARM EST 15 MIN: CPT | Mod: TEL | Performed by: PHARMACIST

## 2020-09-28 NOTE — Clinical Note
JOANN LEOS note, thanks!    Pamella Roland, PharmD  Medication Therapy Management Pharmacist  [phone]

## 2020-09-28 NOTE — PROGRESS NOTES
MTM ENCOUNTER  SUBJECTIVE/OBJECTIVE:                           Farzana Hughes is a 65 year old female called for a follow-up visit. She was referred to me from [facility] for diabetes.  Patient's  was present during visit.  Today's visit is a follow-up MTM visit from 9/14/20.     Patient consented to a telehealth visit: yes  Telemedicine Visit Details  Type of service:  Telephone visit  Start Time: 1:31 PM  End Time: 1:51 PM  Originating Location (pt. Location): Home  Distant Location (provider location):  [facility] [place]  Mode of Communication:  Telephone    Chief Complaint: diabetes follow-up.    Allergies/ADRs: Reviewed in chart  Tobacco: She reports that she has never smoked. She has never used smokeless tobacco.  Alcohol: none  Caffeine: no caffeine  Activity: minimal, up and down stairs at home, working on walking  PMH: Reviewed in EHR    Medication Adherence/Access:   Patient uses pill box(es).  Patient takes medications 2 time(s) per day. Noon and supper.  Per patient, misses medication morning 2 times per week.   The patient fills medications at [facility]: NO, fills medications at Health system.    Type 2 Diabetes:   Metformin  mg once daily (she increased from 250 mg once daily two days ago)    Tolerating XL formulation just fine, some minor upset stomach, but nothing bad.   She switched from XL metformin to regular release ~ 1 month ago d/t her provider's concerns about carcinogens.  She tolerated the XL much better (was only on 250 mg once daily), had diarrhea, low grade fever with regular release. She's also recently found out there are four people in her family that can't take metformin.     has DM, is on U500 insulin.  Pt is experiencing the following side effects: GI upset  Blood sugar monitoring: Continuous Glucose Monitor. Ranges (patient reported): 90-120s  Date FBG/ 2hours post Lunch/2hours post Dinner /2hours post    9/17 130       9/18  138     9/19 144      9/20 157  /122    9/21 9/22 147  /121    9/23 135 198 - worked in garage     9/24 108  150     9/25 9/26 Sensor falling off      9/27 (new sensor)   125   /147    9/28 140             Symptoms of low blood sugar? 68, CGM came part-way out of arm Friday-Sunday, lower readings, but when she checked with finger stick, BG was 130.   Symptoms of high blood sugar? none  Diet/Exercise: see above  Aspirin: Not taking due to allergy  Statin: Yes: atorvastatin   ACEi/ARB: Yes: lisinopril.   Urine Albumin:   Lab Results   Component Value Date    UMALCR 7.10 11/01/2018   ]  Lab Results   Component Value Date    A1C 6.6 07/31/2020    A1C 6.4 12/05/2019    A1C 6.5 07/30/2019    A1C 6.4 11/01/2018    A1C 6.4 08/09/2018     Today's Vitals: There were no vitals taken for this visit.      ASSESSMENT:                              Medication Adherence: No issues identified    Type 2 Diabetes: Patient is meeting A1c goal of < 7%. Self monitoring of blood glucose is not at goal of fasting  mg/dL. Pt would benefit from minimum SMBG: Check blood sugars fasting, and occasionally 2 hours after starting a meal.  Metformin : continuing current dose.     PLAN:                            1. Continue current medications.    Addendum 9/30/20: returning her voicemail, she'd like test strips for Contour Next (had been using her 's).  New Rx for this sent.    I spent 20 minutes with this patient today. All changes were made via collaborative practice agreement with [facility]. A copy of the visit note was provided to the patient's primary care provider.    Will follow up in 2 weeks.    The patient declined a summary of these recommendations.     Pamella Roland, PharmD  Medication Therapy Management Pharmacist  [phone]

## 2020-10-12 ENCOUNTER — VIRTUAL VISIT (OUTPATIENT)
Dept: PHARMACY | Facility: CLINIC | Age: 65
End: 2020-10-12
Payer: COMMERCIAL

## 2020-10-12 DIAGNOSIS — J34.89 SINUS PRESSURE: ICD-10-CM

## 2020-10-12 DIAGNOSIS — E11.9 TYPE 2 DIABETES, DIET CONTROLLED (H): Primary | ICD-10-CM

## 2020-10-12 PROCEDURE — 99607 MTMS BY PHARM ADDL 15 MIN: CPT | Mod: TEL | Performed by: PHARMACIST

## 2020-10-12 PROCEDURE — 99606 MTMS BY PHARM EST 15 MIN: CPT | Mod: TEL | Performed by: PHARMACIST

## 2020-10-12 RX ORDER — FLUTICASONE PROPIONATE 50 MCG
1 SPRAY, SUSPENSION (ML) NASAL DAILY PRN
Qty: 51 G | Refills: 0 | Status: SHIPPED | OUTPATIENT
Start: 2020-10-12

## 2020-10-12 RX ORDER — IPRATROPIUM BROMIDE 42 UG/1
1-2 SPRAY, METERED NASAL 2 TIMES DAILY
COMMUNITY

## 2020-10-12 NOTE — PROGRESS NOTES
MTM ENCOUNTER  SUBJECTIVE/OBJECTIVE:                           Farzana Hughes is a 65 year old female called for a follow-up visit. She was referred to me from [facility] for diabetes. Today's visit is a follow-up MTM visit from 9/28/20.     Patient consented to a telehealth visit: yes  Telemedicine Visit Details  Type of service:  Telephone visit  Start Time: 1:32 PM  End Time: 1:53 PM  Originating Location (pt. Location): Home  Distant Location (provider location):  [facility]  Mode of Communication:  Telephone    Chief Complaint:diabetes follow-up and allergies. She's been told she doesn't have allergies but gets these symptoms every spring and fall.     Allergies/ADRs: Reviewed in chart  Tobacco: She reports that she has never smoked. She has never used smokeless tobacco.  Alcohol: none  Caffeine: no caffeine  Activity: minimal, up and down stairs at home, working on walking  PMH: Reviewed in EHR    Medication Adherence/Access:   Patient uses pill box(es).  Patient takes medications 2 time(s) per day. Noon and supper.  Per patient, misses medication morning 2 times per week.   The patient fills medications at [facility]: NO, fills medications at [facility].    Type 2 Diabetes:   Metformin  mg once daily    Tolerating XL formulation just fine, some minor upset stomach, but nothing bad.   She switched from XL metformin to regular release ~ 1 month ago d/t her provider's concerns about carcinogens.  She tolerated the XL much better (was only on 250 mg once daily), had diarrhea, low grade fever with regular release. She's also recently found out there are four people in her family that can't take metformin.     has DM, is on U500 insulin.  Pt is experiencing the following side effects: GI upset  Blood sugar monitoring: CGM and Contour Next, checking 1x/day. Ranges (patient reported): 130, 121, 129, 108, 113, 124, 84, 109, 131, 114, 122, 117, 118, 127.  Avg 112.  Symptoms of low blood sugar? n/a  Symptoms of high blood sugar? none  Diet/Exercise: see above  Aspirin: Not taking due to allergy  Statin: Yes: atorvastatin   ACEi/ARB: Yes: lisinopril.   Urine Albumin:   Lab Results   Component Value Date    UMALCR 7.10 11/01/2018   []  Lab Results   Component Value Date    A1C 6.6 07/31/2020    A1C 6.4 12/05/2019    A1C 6.5 07/30/2019    A1C 6.4 11/01/2018    A1C 6.4 08/09/2018     Allergic Rhinitis:   ipratropium nasal spay 0.6% 1-2 sprays twice daily x 3 weeks    Flonase works better than ipratropium for her.  She's having a cough that's draining into her throat and she knows Flonase would work better for her. Eyes are also itchy and bothering her, she prefers not to use Pataday if she doesn't have to. Prefers non-pharmacological options like warm washcloth.   Primary symptoms are post-nasal drip, itchy/watery eyes and cough.     Today's Vitals: There were no vitals taken for this visit.      ASSESSMENT:                              Medication Adherence: No issues identified    Type 2 Diabetes: Patient is meeting A1c goal of < 7%. Self monitoring of blood glucose is at goal of fasting  mg/dL. Pt would benefit from  Metformin : continuing current dose.     Allergic rhinitis: Patient would benefit from replacing ipratropium with fluticasone nasal spray 1 spray(s) once daily, as this has worked well for her in the past.     PLAN:                              1. Ok to stop ipratropium and restart Flonase nasal spray 1-2 sprays in each nostril once daily as needed.    2. Follow-up with Dr. Barakat in 1 month.      I spent 21 minutes with this patient today. All changes were made via collaborative practice agreement with [facility]. A copy of the visit note was provided to the patient's primary care provider.    Will follow up in 3 months.    The patient declined a summary of these recommendations.     Pamella Roland, PharmD  Medication Therapy  Management Pharmacist  [phone]

## 2020-10-12 NOTE — Clinical Note
JOANN LEOS note, thanks!    Pamella Roland, PharmD  Medication Therapy Management Pharmacist  [phone]

## 2020-10-13 ENCOUNTER — TELEPHONE (OUTPATIENT)
Dept: FAMILY MEDICINE | Facility: CLINIC | Age: 65
End: 2020-10-13

## 2020-10-13 DIAGNOSIS — E11.649 UNCONTROLLED TYPE 2 DIABETES MELLITUS WITH HYPOGLYCEMIA WITHOUT COMA (H): Primary | ICD-10-CM

## 2020-10-13 NOTE — TELEPHONE ENCOUNTER
Reason for call:  Order   Order or referral being requested: Physical Labs   Reason for request: yearly   Date needed: 10/28/2020  Has the patient been seen by the PCP for this problem? YES    Additional comments: Patient made physical for 11/02/2020 with Dr. Barakat, scheduled physical labs for 10/28/2020.     Phone number to reach patient:  Cell number on file:    Telephone Information:   Mobile [phone]       Best Time:  Any     Can we leave a detailed message on this number?  YES    Travel screening: Not Applicable

## 2020-10-13 NOTE — TELEPHONE ENCOUNTER
Order pended for A1C. Please review and sign if approved and advise if any additional orders are needed.    Jimena Ordoñez RN  [facility]

## 2020-10-15 ENCOUNTER — TELEPHONE (OUTPATIENT)
Dept: PHARMACY | Facility: CLINIC | Age: 65
End: 2020-10-15

## 2020-10-15 DIAGNOSIS — E11.9 TYPE 2 DIABETES, DIET CONTROLLED (H): Primary | ICD-10-CM

## 2020-10-15 RX ORDER — METFORMIN HCL 500 MG
500 TABLET, EXTENDED RELEASE 24 HR ORAL
Qty: 90 TABLET | Refills: 1 | Status: SHIPPED | OUTPATIENT
Start: 2020-10-15 | End: 2021-03-02

## 2020-10-15 NOTE — TELEPHONE ENCOUNTER
Pamella is out of the office today, I'm covering the MTM consult pool.  Rx sent to Farzana's pharmacy for metformin XR 500mg daily (per Pamella's last note).    Naomi Staley, PharmD, [facility]  Medication Therapy Management Provider  Pager: [phone]

## 2020-10-16 NOTE — TELEPHONE ENCOUNTER
Notified patient that order for A1c was placed, she already has lab appointment.  She also wants to talk to PCP further about the intermittent facial swelling.  She had a video visit with allergy provider and he noticed the swelling and thinks the Lisinopril could be causing this.  She does not always have facial swelling it comes and goes.  No swelling now.  Advised to call back sooner if swelling worsens. Appointment notes updated to discuss at physical.   Kathi Crow RN

## 2020-10-20 ENCOUNTER — TELEPHONE (OUTPATIENT)
Dept: FAMILY MEDICINE | Facility: CLINIC | Age: 65
End: 2020-10-20

## 2020-10-20 NOTE — TELEPHONE ENCOUNTER
Spoke with pt. [facility] pharmacist put her back on the Metformin XR, but when she called the pharmacy regarding her prescription, they said they had a script for Metformin IR, not ER. She has been out of the medication for 2 days. Called pharmacy and they said it was entered incorrectly in their system. Advised that this should be for Metformin ER 500mg. hospitals medication is too soon to fill with insurance until 10/21. Pt was notified of this.    Jimena Ordoñez RN  [facility]

## 2020-10-20 NOTE — TELEPHONE ENCOUNTER
Reason for Call:  Other call back    Detailed comments: patient is calling to discuss medications, states there was a mix up. Please call to discuss. Thank you.    Phone Number Patient can be reached at: Cell number on file:    Telephone Information:   Mobile [phone]       Best Time:     Can we leave a detailed message on this number? YES    Call taken on 10/20/2020 at 1:45 PM by Maci Whiting

## 2020-10-26 ENCOUNTER — TRANSFERRED RECORDS (OUTPATIENT)
Dept: HEALTH INFORMATION MANAGEMENT | Facility: CLINIC | Age: 65
End: 2020-10-26

## 2020-10-28 DIAGNOSIS — T63.91XD TOXIC EFFECT OF VENOM, ACCIDENTAL OR UNINTENTIONAL, SUBSEQUENT ENCOUNTER: ICD-10-CM

## 2020-10-28 DIAGNOSIS — E78.5 HYPERLIPIDEMIA LDL GOAL <100: ICD-10-CM

## 2020-10-28 DIAGNOSIS — E11.649 UNCONTROLLED TYPE 2 DIABETES MELLITUS WITH HYPOGLYCEMIA WITHOUT COMA (H): ICD-10-CM

## 2020-10-28 DIAGNOSIS — E11.9 TYPE 2 DIABETES, DIET CONTROLLED (H): ICD-10-CM

## 2020-10-28 DIAGNOSIS — E11.9 TYPE 2 DIABETES MELLITUS WITHOUT COMPLICATION, WITHOUT LONG-TERM CURRENT USE OF INSULIN (H): ICD-10-CM

## 2020-10-28 LAB — HBA1C MFR BLD: 6.5 % (ref 0–5.6)

## 2020-10-28 PROCEDURE — 83036 HEMOGLOBIN GLYCOSYLATED A1C: CPT | Performed by: FAMILY MEDICINE

## 2020-10-28 PROCEDURE — 80061 LIPID PANEL: CPT | Performed by: FAMILY MEDICINE

## 2020-10-28 PROCEDURE — 86003 ALLG SPEC IGE CRUDE XTRC EA: CPT | Performed by: FAMILY MEDICINE

## 2020-10-28 PROCEDURE — 80053 COMPREHEN METABOLIC PANEL: CPT | Performed by: FAMILY MEDICINE

## 2020-10-28 PROCEDURE — 36415 COLL VENOUS BLD VENIPUNCTURE: CPT | Performed by: FAMILY MEDICINE

## 2020-10-29 LAB
ALBUMIN SERPL-MCNC: 3.8 G/DL (ref 3.4–5)
ALP SERPL-CCNC: 111 U/L (ref 40–150)
ALT SERPL W P-5'-P-CCNC: 31 U/L (ref 0–50)
ANION GAP SERPL CALCULATED.3IONS-SCNC: 5 MMOL/L (ref 3–14)
AST SERPL W P-5'-P-CCNC: 19 U/L (ref 0–45)
BILIRUB SERPL-MCNC: 0.3 MG/DL (ref 0.2–1.3)
BUN SERPL-MCNC: 17 MG/DL (ref 7–30)
CALCIUM SERPL-MCNC: 8.7 MG/DL (ref 8.5–10.1)
CHLORIDE SERPL-SCNC: 108 MMOL/L (ref 94–109)
CHOLEST SERPL-MCNC: 188 MG/DL
CO2 SERPL-SCNC: 27 MMOL/L (ref 20–32)
CREAT SERPL-MCNC: 0.94 MG/DL (ref 0.52–1.04)
GFR SERPL CREATININE-BSD FRML MDRD: 65 ML/MIN/{1.73_M2}
GLUCOSE SERPL-MCNC: 125 MG/DL (ref 70–99)
HDLC SERPL-MCNC: 50 MG/DL
LDLC SERPL CALC-MCNC: 101 MG/DL
NONHDLC SERPL-MCNC: 138 MG/DL
POTASSIUM SERPL-SCNC: 4.2 MMOL/L (ref 3.4–5.3)
PROT SERPL-MCNC: 7.3 G/DL (ref 6.8–8.8)
SODIUM SERPL-SCNC: 140 MMOL/L (ref 133–144)
TRIGL SERPL-MCNC: 184 MG/DL

## 2020-10-29 ASSESSMENT — ENCOUNTER SYMPTOMS: ARTHRALGIAS: 1

## 2020-10-30 ENCOUNTER — MEDICAL CORRESPONDENCE (OUTPATIENT)
Dept: HEALTH INFORMATION MANAGEMENT | Facility: CLINIC | Age: 65
End: 2020-10-30

## 2020-10-30 LAB
HONEY BEE IGE QN: <0.1 KU(A)/L
PAPER WASP IGE QN: <0.1 KU(A)/L
WHITEFACED HORNET IGE QN: <0.1 KU(A)/L
YELLOW HORNET IGE QN: <0.1 KU(A)/L
YELLOW JACKET IGE QN: <0.1 KU(A)/L

## 2020-10-30 NOTE — RESULT ENCOUNTER NOTE
Venom testing negative. Will do skin testing when available. I will have nurse add your name to skin testing list. Thanks.     Dr. Logan

## 2020-11-02 ENCOUNTER — OFFICE VISIT (OUTPATIENT)
Dept: FAMILY MEDICINE | Facility: CLINIC | Age: 65
End: 2020-11-02
Payer: COMMERCIAL

## 2020-11-02 ENCOUNTER — ANCILLARY PROCEDURE (OUTPATIENT)
Dept: MAMMOGRAPHY | Facility: CLINIC | Age: 65
End: 2020-11-02
Payer: COMMERCIAL

## 2020-11-02 VITALS
TEMPERATURE: 98.2 F | RESPIRATION RATE: 16 BRPM | HEIGHT: 60 IN | OXYGEN SATURATION: 98 % | SYSTOLIC BLOOD PRESSURE: 114 MMHG | HEART RATE: 76 BPM | DIASTOLIC BLOOD PRESSURE: 84 MMHG | WEIGHT: 196.5 LBS | BODY MASS INDEX: 38.58 KG/M2

## 2020-11-02 DIAGNOSIS — Z11.4 SCREENING FOR HIV (HUMAN IMMUNODEFICIENCY VIRUS): ICD-10-CM

## 2020-11-02 DIAGNOSIS — E78.5 HYPERLIPIDEMIA LDL GOAL <100: ICD-10-CM

## 2020-11-02 DIAGNOSIS — E11.9 TYPE 2 DIABETES MELLITUS WITHOUT COMPLICATION, WITHOUT LONG-TERM CURRENT USE OF INSULIN (H): ICD-10-CM

## 2020-11-02 DIAGNOSIS — Z00.00 ROUTINE GENERAL MEDICAL EXAMINATION AT A HEALTH CARE FACILITY: Primary | ICD-10-CM

## 2020-11-02 DIAGNOSIS — Z12.31 VISIT FOR SCREENING MAMMOGRAM: ICD-10-CM

## 2020-11-02 DIAGNOSIS — Z23 NEED FOR PROPHYLACTIC VACCINATION AND INOCULATION AGAINST INFLUENZA: ICD-10-CM

## 2020-11-02 PROBLEM — J34.89 SINUS PRESSURE: Status: RESOLVED | Noted: 2019-07-11 | Resolved: 2020-11-02

## 2020-11-02 PROCEDURE — 90682 RIV4 VACC RECOMBINANT DNA IM: CPT | Performed by: FAMILY MEDICINE

## 2020-11-02 PROCEDURE — 99396 PREV VISIT EST AGE 40-64: CPT | Mod: 25 | Performed by: FAMILY MEDICINE

## 2020-11-02 PROCEDURE — 90471 IMMUNIZATION ADMIN: CPT | Performed by: FAMILY MEDICINE

## 2020-11-02 PROCEDURE — 82043 UR ALBUMIN QUANTITATIVE: CPT | Performed by: FAMILY MEDICINE

## 2020-11-02 PROCEDURE — 77067 SCR MAMMO BI INCL CAD: CPT | Performed by: RADIOLOGY

## 2020-11-02 ASSESSMENT — MIFFLIN-ST. JEOR: SCORE: 1367.82

## 2020-11-02 ASSESSMENT — ENCOUNTER SYMPTOMS: ARTHRALGIAS: 1
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
## 2020-11-02 NOTE — PATIENT INSTRUCTIONS
Preventive Health Recommendations  Female Ages 50 - 64    Yearly exam: See your health care provider every year in order to  o Review health changes.   o Discuss preventive care.    o Review your medicines if your doctor has prescribed any.      Get a Pap test every three years (unless you have an abnormal result and your provider advises testing more often).    If you get Pap tests with HPV test, you only need to test every 5 years, unless you have an abnormal result.     You do not need a Pap test if your uterus was removed (hysterectomy) and you have not had cancer.    You should be tested each year for STDs (sexually transmitted diseases) if you're at risk.     Have a mammogram every 1 to 2 years.    Have a colonoscopy at age 50, or have a yearly FIT test (stool test). These exams screen for colon cancer.      Have a cholesterol test every 5 years, or more often if advised.    Have a diabetes test (fasting glucose) every three years. If you are at risk for diabetes, you should have this test more often.     If you are at risk for osteoporosis (brittle bone disease), think about having a bone density scan (DEXA).    Shots: Get a flu shot each year. Get a tetanus shot every 10 years.    Nutrition:     Eat at least 5 servings of fruits and vegetables each day.    Eat whole-grain bread, whole-wheat pasta and brown rice instead of white grains and rice.    Get adequate Calcium and Vitamin D.     Lifestyle    Exercise at least 150 minutes a week (30 minutes a day, 5 days a week). This will help you control your weight and prevent disease.    Limit alcohol to one drink per day.    No smoking.     Wear sunscreen to prevent skin cancer.     See your dentist every six months for an exam and cleaning.    See your eye doctor every 1 to 2 years.      Healthy Lifestyle   Nutrition and healthy eating: The Mediterranean Diet  Ready to switch to a more heart-healthy diet? Here's how to get started with the Mediterranean  diet.  By [facility] Staff   If you're looking for a heart-healthy eating plan, the Mediterranean diet might be right for you.  The Mediterranean diet blends the basics of healthy eating with the traditional flavors and cooking methods of the Mediterranean.  Interest in the Mediterranean diet began in the 1960s with the observation that coronary heart disease caused fewer deaths in Mediterranean countries, such as [place] and [place], than in the [place] and northern [place]. Subsequent studies found that the Mediterranean diet is associated with reduced risk factors for cardiovascular disease.  The Mediterranean diet is one of the healthy eating plans recommended by the Dietary Guidelines for Americans to promote health and prevent chronic disease.  It is also recognized by the World Health Organization as a healthy and sustainable dietary pattern and as an intangible cultural asset by the United National Educational, Scientific and Cultural Organization.  The Mediterranean diet is a way of eating based on the traditional cuisine of countries bordering the [place] Sea. While there is no single definition of the Mediterranean diet, it is typically high in vegetables, fruits, whole grains, beans, nut and seeds, and olive oil.  The main components of Mediterranean diet include:    Daily consumption of vegetables, fruits, whole grains and healthy fats     Weekly intake of fish, poultry, beans and eggs     Moderate portions of dairy products     Limited intake of red meat  Other important elements of the Mediterranean diet are sharing meals with family and friends, enjoying a glass of red wine and being physically active.  The foundation of the Mediterranean diet is vegetables, fruits, herbs, nuts, beans and whole grains. Meals are built around these plant-based foods. Moderate amounts of dairy, poultry and eggs are also central to the Mediterranean Diet, as is seafood. In contrast, red meat is eaten only  "occasionally.  Healthy fats are a mainstay of the Mediterranean diet. They're eaten instead of less healthy fats, such as saturated and trans fats, which contribute to heart disease.  Olive oil is the primary source of added fat in the Mediterranean diet. Olive oil provides monounsaturated fat, which has been found to lower total cholesterol and low-density lipoprotein (LDL or \"bad\") cholesterol levels. Nuts and seeds also contain monounsaturated fat.  Fish are also important in the Mediterranean diet. Fatty fish -- such as mackerel, herring, sardines, albacore tuna, salmon and lake trout -- are rich in omega-3 fatty acids, a type of polyunsaturated fat that may reduce inflammation in the body. Omega-3 fatty acids also help decrease triglycerides, reduce blood clotting, and decrease the risk of stroke and heart failure.  The Mediterranean diet typically allows red wine in moderation. Although alcohol has been associated with a reduced risk of heart disease in some studies, it's by no means risk free. The Dietary Guidelines for Americans caution against beginning to drink or drinking more often on the basis of potential health benefits.  Interested in trying the Mediterranean diet? These tips will help you get started:    Eat more fruits and vegetables. Aim for 7 to 10 servings a day of fruit and vegetables.     Opt for whole grains. Switch to whole-grain bread, cereal and pasta. [place] with other whole grains, such as bulgur and farro.     Use healthy fats. Try olive oil as a replacement for butter when cooking. Instead of putting butter or margarine on bread, try dipping it in flavored olive oil.     Eat more seafood. Eat fish twice a week. Fresh or water-packed tuna, salmon, trout, mackerel and herring are healthy choices. Grilled fish tastes good and requires little cleanup. Avoid deep-fried fish.     Reduce red meat. Substitute fish, poultry or beans for meat. If you eat meat, make sure it's lean and keep " portions small.     Enjoy some dairy. Eat low-fat Greek or plain yogurt and small amounts of a variety of cheeses.     Spice it up. Herbs and spices boost flavor and lessen the need for salt.  The Mediterranean diet is a delicious and healthy way to eat. Many people who switch to this style of eating say they'll never eat any other way.

## 2020-11-03 LAB
CREAT UR-MCNC: 42 MG/DL
MICROALBUMIN UR-MCNC: <5 MG/L
MICROALBUMIN/CREAT UR: NORMAL MG/G CR (ref 0–25)

## 2020-11-14 ENCOUNTER — HEALTH MAINTENANCE LETTER (OUTPATIENT)
Age: 65
End: 2020-11-14

## 2021-01-11 DIAGNOSIS — J45.40 MODERATE PERSISTENT ASTHMA, UNCOMPLICATED: ICD-10-CM

## 2021-01-14 RX ORDER — MONTELUKAST SODIUM 10 MG/1
TABLET ORAL
Qty: 90 TABLET | Refills: 0 | Status: SHIPPED | OUTPATIENT
Start: 2021-01-14 | End: 2021-03-02

## 2021-01-14 NOTE — TELEPHONE ENCOUNTER
Prescription approved per [facility] Refill Protocol.  Yaritza Powers RN

## 2021-01-18 ENCOUNTER — TELEPHONE (OUTPATIENT)
Dept: FAMILY MEDICINE | Facility: CLINIC | Age: 66
End: 2021-01-18

## 2021-01-18 DIAGNOSIS — M48.00 SPINAL STENOSIS, UNSPECIFIED SPINAL REGION: ICD-10-CM

## 2021-01-18 DIAGNOSIS — G56.00 CARPAL TUNNEL SYNDROME, UNSPECIFIED LATERALITY: ICD-10-CM

## 2021-01-18 DIAGNOSIS — G43.809 OTHER MIGRAINE WITHOUT STATUS MIGRAINOSUS, NOT INTRACTABLE: Primary | ICD-10-CM

## 2021-01-18 NOTE — TELEPHONE ENCOUNTER
Patient can no longer see neurology she was going to outside of . Can she get a referral to see Neurology in [place]? Ok to leave a detailed message.  My Chart not currently working. Please call.

## 2021-01-18 NOTE — TELEPHONE ENCOUNTER
Routed to PCP to please advise.    Suki BSN-RN  Triage Nurse  [facility]: [facility]

## 2021-01-20 ENCOUNTER — TELEPHONE (OUTPATIENT)
Dept: FAMILY MEDICINE | Facility: CLINIC | Age: 66
End: 2021-01-20

## 2021-01-20 ENCOUNTER — OFFICE VISIT (OUTPATIENT)
Dept: URGENT CARE | Facility: URGENT CARE | Age: 66
End: 2021-01-20
Payer: COMMERCIAL

## 2021-01-20 VITALS
RESPIRATION RATE: 16 BRPM | SYSTOLIC BLOOD PRESSURE: 130 MMHG | HEART RATE: 90 BPM | OXYGEN SATURATION: 97 % | TEMPERATURE: 98.3 F | DIASTOLIC BLOOD PRESSURE: 83 MMHG

## 2021-01-20 DIAGNOSIS — R05.9 COUGH: ICD-10-CM

## 2021-01-20 DIAGNOSIS — J45.21 MILD INTERMITTENT ASTHMA WITH ACUTE EXACERBATION: Primary | ICD-10-CM

## 2021-01-20 DIAGNOSIS — E11.9 TYPE 2 DIABETES MELLITUS WITHOUT COMPLICATION, WITHOUT LONG-TERM CURRENT USE OF INSULIN (H): ICD-10-CM

## 2021-01-20 PROCEDURE — 99214 OFFICE O/P EST MOD 30 MIN: CPT | Performed by: NURSE PRACTITIONER

## 2021-01-20 RX ORDER — BENZONATATE 200 MG/1
200 CAPSULE ORAL 3 TIMES DAILY PRN
Qty: 21 CAPSULE | Refills: 0 | Status: SHIPPED | OUTPATIENT
Start: 2021-01-20 | End: 2021-01-27

## 2021-01-20 RX ORDER — ALBUTEROL SULFATE 90 UG/1
2 AEROSOL, METERED RESPIRATORY (INHALATION) EVERY 6 HOURS PRN
Qty: 1 INHALER | Refills: 0 | Status: SHIPPED | OUTPATIENT
Start: 2021-01-20 | End: 2021-06-02

## 2021-01-20 ASSESSMENT — ENCOUNTER SYMPTOMS
SHORTNESS OF BREATH: 0
NAUSEA: 0
COUGH: 1
RHINORRHEA: 0
HEADACHES: 0
VOMITING: 0
WHEEZING: 1
CHILLS: 0
DIARRHEA: 0
SORE THROAT: 0
FEVER: 0

## 2021-01-20 NOTE — TELEPHONE ENCOUNTER
Patient requesting an appointment in [place] for a cough. She has history of pneumonia.    Patient states that she's leaving for a trip to [place] on 1/22/21.     No openings with [place] providers for face to face visits.   Patient instructed to present to the [facility]. She verbalized a good understanding and agreed with this plan.     Margaret GALAN  [facility]
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
## 2021-01-20 NOTE — PATIENT INSTRUCTIONS
Patient Education     Understanding Asthma    Asthma is a long-term (chronic) lung condition. It involves the airways (bronchial tubes). It happens when a trigger causes your airways to swell and become narrow. The muscles around your airways start to tighten. When your airways start to narrow, air can't move in and out of your lungs very well. Mucus also builds up along the airways. This makes it even harder to move air in and out of your lungs.   Experts are not exactly sure what causes asthma. It may be caused by a mix of inherited and environmental factors. People with asthma may have no symptoms until they are exposed to an allergen or trigger.   Healthy lungs   Inside your lungs there are branching airways made of stretchy tissue. Each airway is wrapped with bands of muscle. The airways are smaller as they go deeper into the lungs. The smallest airways end in clusters of tiny balloon-like air sacs (alveoli). These clusters are surrounded by blood vessels. When you breathe in (inhale), air enters the lungs. It travels down through the airways until it reaches the air sacs. When you breathe out (exhale), air travels up through the airways and out of the lungs. The airways make mucus that traps particles you breathe in. Normally, the mucus is then swept out of the lungs by tiny hairs (cilia) that line the airways. The mucus is swallowed or coughed up during your day.   What the lungs do   The air you inhale contains oxygen. When oxygen reaches the air sacs, it passes into the blood vessels around the sacs. Your blood then sends oxygen to all of your cells. As you exhale, carbon dioxide is removed in a similar way from the blood in the air sacs, and from your body.   When you have asthma   People with asthma have very sensitive airways. This means the airways react to certain things called triggers. Triggers can include pollen, dust, or smoke. Triggers cause inflammation. This makes the airways swell and become  narrow. This is a long-lasting (chronic) problem. Your airways may not always be narrow enough so that you notice breathing problems.   Symptoms of chronic inflammation include:     Coughing (chronic)    A feeling of tightness in your chest    Feeling short of breath    Wheezing (a whistling noise, especially when breathing out)    Low energy or feeling tired  In some people, over time chronic mild inflammation can lead to lasting (permanent) scarring of airways and loss of lung function.   Asthma flare-ups   When sensitive airways are irritated by a trigger, the muscles around the airways tighten. The lining of the airways swells. Thick, sticky mucus increases and partly clogs the airways. All of this makes it harder to breathe.   Symptoms of flare-ups may include:    Coughing, especially at night. You may not be able to sleep because of coughing.    Getting tired or out of breath easily    Wheezing    Chest tightness    Faster breathing when at rest  Flare-ups can be life-threatening. In a severe flare-up, the muscle tightening, swelling, and mucus are worse. It s very hard to breathe. Your body can't get enough oxygen and can't remove carbon dioxide. Waste gas is trapped in the alveoli. Gas exchange can t occur. The body is not getting enough oxygen. Without oxygen, body tissues, especially brain tissue, begin to get damaged. If this goes on for long, it can lead to severe brain damage or death.   Call 911 (or have someone call for you) if you have any of these symptoms and they are not relieved right away by taking your quick-relief medicine as prescribed:     Trouble breathing    Feeling too short of breath to talk or walk    Lips or fingers turning blue    Feeling lightheaded or dizzy, as though you are about to pass out    Peak flow less than 50% of your personal best, if you use a peak flow meter  Managing your asthma  Asthma is a long-term condition. So it s important to work with your healthcare provider  to manage it. If you have asthma, you can prevent flare-ups. Develop an asthma action plan with your healthcare provider. It can help control your asthma and manage your symptoms. An asthma action plan also tells you and your family or friends what to do if your asthma flares up or gets worse.   Take your medicine as prescribed. Also learn about your asthma triggers. Knowing what causes your asthma to flare up in the first place can help you prevent future breathing problems.   If you smoke, get help to quit.  [employer] last reviewed this educational content on 3/1/2019    9310-4390 [employer] [address] [place] [address]. All rights reserved. This information is not intended as a substitute for professional medical care. Always follow your healthcare professional's instructions.

## 2021-02-17 DIAGNOSIS — E78.5 HYPERLIPIDEMIA LDL GOAL <130: ICD-10-CM

## 2021-02-17 NOTE — TELEPHONE ENCOUNTER
Reason for Call:  Medication or medication refill:    Do you use a [facility]?  Name of the pharmacy and phone number for the current request:   [facility] [address], [place], [address]/ [phone]    Name of the medication requested: atorvastatin (LIPITOR) 40 MG tablet    Other request:     Can we leave a detailed message on this number? YES    Phone number patient can be reached at: Cell number on file:    Telephone Information:   Mobile [phone]       Best Time: any    Call taken on 2/17/2021 at 12:55 PM by Makenzie Echols

## 2021-02-19 RX ORDER — ATORVASTATIN CALCIUM 40 MG/1
40 TABLET, FILM COATED ORAL DAILY
Qty: 90 TABLET | Refills: 0 | Status: SHIPPED | OUTPATIENT
Start: 2021-02-19 | End: 2021-04-16

## 2021-02-19 NOTE — TELEPHONE ENCOUNTER
Prescription approved per [facility] Refill Protocol.  Caitie Kelly RN

## 2021-02-24 ENCOUNTER — TELEPHONE (OUTPATIENT)
Dept: PHARMACY | Facility: CLINIC | Age: 66
End: 2021-02-24

## 2021-02-24 NOTE — TELEPHONE ENCOUNTER
Called to schedule MTM appt, no answer.    Pamella Roland, PharmD  Medication Therapy Management Pharmacist  [phone]  [facility]: [phone]

## 2021-03-02 ENCOUNTER — VIRTUAL VISIT (OUTPATIENT)
Dept: FAMILY MEDICINE | Facility: CLINIC | Age: 66
End: 2021-03-02
Payer: COMMERCIAL

## 2021-03-02 DIAGNOSIS — K21.9 GASTROESOPHAGEAL REFLUX DISEASE WITHOUT ESOPHAGITIS: ICD-10-CM

## 2021-03-02 DIAGNOSIS — L29.9 ITCHING: ICD-10-CM

## 2021-03-02 DIAGNOSIS — E11.9 TYPE 2 DIABETES, DIET CONTROLLED (H): ICD-10-CM

## 2021-03-02 DIAGNOSIS — I10 HYPERTENSION GOAL BP (BLOOD PRESSURE) < 130/80: ICD-10-CM

## 2021-03-02 DIAGNOSIS — J45.40 MODERATE PERSISTENT ASTHMA, UNCOMPLICATED: ICD-10-CM

## 2021-03-02 DIAGNOSIS — J45.40 MODERATE PERSISTENT ASTHMA WITHOUT COMPLICATION: ICD-10-CM

## 2021-03-02 PROCEDURE — 96127 BRIEF EMOTIONAL/BEHAV ASSMT: CPT | Performed by: FAMILY MEDICINE

## 2021-03-02 PROCEDURE — 99213 OFFICE O/P EST LOW 20 MIN: CPT | Mod: TEL | Performed by: FAMILY MEDICINE

## 2021-03-02 RX ORDER — METFORMIN HCL 500 MG
500 TABLET, EXTENDED RELEASE 24 HR ORAL
Qty: 90 TABLET | Refills: 1 | Status: SHIPPED | OUTPATIENT
Start: 2021-03-02 | End: 2021-11-28

## 2021-03-02 RX ORDER — MONTELUKAST SODIUM 10 MG/1
TABLET ORAL
Qty: 90 TABLET | Refills: 1 | Status: SHIPPED | OUTPATIENT
Start: 2021-03-02 | End: 2022-01-06

## 2021-03-02 RX ORDER — OMEPRAZOLE 40 MG/1
CAPSULE, DELAYED RELEASE ORAL
Qty: 90 CAPSULE | Refills: 1 | Status: SHIPPED | OUTPATIENT
Start: 2021-03-02 | End: 2022-01-06

## 2021-03-02 RX ORDER — DOXEPIN HYDROCHLORIDE 10 MG/1
10 CAPSULE ORAL AT BEDTIME
Qty: 90 CAPSULE | Refills: 2 | Status: SHIPPED | OUTPATIENT
Start: 2021-03-02 | End: 2021-04-23

## 2021-03-02 RX ORDER — ALBUTEROL SULFATE 90 UG/1
2 AEROSOL, METERED RESPIRATORY (INHALATION) EVERY 4 HOURS PRN
Qty: 1 INHALER | Refills: 3 | Status: SHIPPED | OUTPATIENT
Start: 2021-03-02 | End: 2022-01-07

## 2021-03-02 RX ORDER — LISINOPRIL 20 MG/1
TABLET ORAL
Qty: 45 TABLET | Refills: 3 | Status: SHIPPED | OUTPATIENT
Start: 2021-03-02 | End: 2022-01-07

## 2021-03-02 ASSESSMENT — ANXIETY QUESTIONNAIRES
GAD7 TOTAL SCORE: 1
IF YOU CHECKED OFF ANY PROBLEMS ON THIS QUESTIONNAIRE, HOW DIFFICULT HAVE THESE PROBLEMS MADE IT FOR YOU TO DO YOUR WORK, TAKE CARE OF THINGS AT HOME, OR GET ALONG WITH OTHER PEOPLE: NOT DIFFICULT AT ALL
3. WORRYING TOO MUCH ABOUT DIFFERENT THINGS: NOT AT ALL
1. FEELING NERVOUS, ANXIOUS, OR ON EDGE: SEVERAL DAYS
2. NOT BEING ABLE TO STOP OR CONTROL WORRYING: NOT AT ALL
6. BECOMING EASILY ANNOYED OR IRRITABLE: NOT AT ALL
5. BEING SO RESTLESS THAT IT IS HARD TO SIT STILL: NOT AT ALL
7. FEELING AFRAID AS IF SOMETHING AWFUL MIGHT HAPPEN: NOT AT ALL

## 2021-03-02 ASSESSMENT — PATIENT HEALTH QUESTIONNAIRE - PHQ9
5. POOR APPETITE OR OVEREATING: NOT AT ALL
SUM OF ALL RESPONSES TO PHQ QUESTIONS 1-9: 4

## 2021-03-02 NOTE — PROGRESS NOTES
Farzana is a 65 year old who is being evaluated via a billable telephone visit.      What phone number would you like to be contacted at? [phone]  How would you like to obtain your AVS? MyChart    Assessment & Plan       ICD-10-CM    1. Moderate persistent asthma without complication  J45.40 albuterol (PROAIR HFA) 108 (90 Base) MCG/ACT inhaler   2. Itching  L29.9 doxepin (SINEQUAN) 10 MG capsule   3. Hypertension goal BP (blood pressure) < 130/80  I10 lisinopril (ZESTRIL) 20 MG tablet   4. Type 2 diabetes, diet controlled (H)  E11.9 metFORMIN (GLUCOPHAGE-XR) 500 MG 24 hr tablet   5. Moderate persistent asthma, uncomplicated  J45.40 montelukast (SINGULAIR) 10 MG tablet   6. Gastroesophageal reflux disease without esophagitis  K21.9 omeprazole (PRILOSEC) 40 MG DR capsule         Review of external notes as documented elsewhere in note             Return in about 6 months (around 9/2/2021) for For Re-Assessment.    Villa Blake MD  [facility] Yanes is a 65 year old who presents for the following health issues     HPI       Hyperlipidemia Follow-Up      Are you regularly taking any medication or supplement to lower your cholesterol?   Yes- atorvastatin    Are you having muscle aches or other side effects that you think could be caused by your cholesterol lowering medication?  No    Hypertension Follow-up      Do you check your blood pressure regularly outside of the clinic? No     Are you following a low salt diet? Yes    Are your blood pressures ever more than 140 on the top number (systolic) OR more   than 90 on the bottom number (diastolic), for example 140/90?     Depression and Anxiety Follow-Up    How are you doing with your depression since your last visit? Worsened     How are you doing with your anxiety since your last visit?  Worsened     Are you having other symptoms that might be associated with depression or anxiety?     Have you had a significant life event?  OTHER:  being in hospital currently     Do you have any concerns with your use of alcohol or other drugs? No    Social History     Tobacco Use     Smoking status: Never Smoker     Smokeless tobacco: Never Used   Substance Use Topics     Alcohol use: No     Drug use: No     PHQ 7/24/2019 2/11/2020 3/2/2021   PHQ-9 Total Score 1 3 4   Q9: Thoughts of better off dead/self-harm past 2 weeks Not at all Not at all Not at all     KING-7 SCORE 4/18/2017 10/11/2017 3/2/2021   Total Score - - -   Total Score 3 6 1         Suicide Assessment Five-step Evaluation and Treatment (SAFE-T)      How many servings of fruits and vegetables do you eat daily?  4 or more    On average, how many sweetened beverages do you drink each day (Examples: soda, juice, sweet tea, etc.  Do NOT count diet or artificially sweetened beverages)?   0    How many days per week do you exercise enough to make your heart beat faster? 3 or less    How many minutes a day do you exercise enough to make your heart beat faster? 9 or less    How many days per week do you miss taking your medication? 0        Review of Systems   Constitutional, HEENT, cardiovascular, pulmonary, gi and gu systems are negative, except as otherwise noted.      Objective           Vitals:  No vitals were obtained today due to virtual visit.    Physical Exam   healthy, alert and no distress  PSYCH: Alert and oriented times 3; coherent speech, normal   rate and volume, able to articulate logical thoughts, able   to abstract reason, no tangential thoughts, no hallucinations   or delusions  Her affect is normal  RESP: No cough, no audible wheezing, able to talk in full sentences  Remainder of exam unable to be completed due to telephone visits                Phone call duration: 20 minutes

## 2021-03-03 ASSESSMENT — ANXIETY QUESTIONNAIRES: GAD7 TOTAL SCORE: 1

## 2021-03-15 ENCOUNTER — TELEPHONE (OUTPATIENT)
Dept: FAMILY MEDICINE | Facility: CLINIC | Age: 66
End: 2021-03-15

## 2021-03-15 NOTE — TELEPHONE ENCOUNTER
Reason for Call:  Other     Detailed comments: Patient would like to speak with provider on Covid 19 vaccine and that she gets reactions with the flu shot should she get this vaccine in [place] or wait to she returns to [place]    Phone Number Patient can be reached at: Home number on file [phone] (home)    Best Time:     Can we leave a detailed message on this number? YES    Call taken on 3/15/2021 at 11:01 AM by Veronica Davila

## 2021-03-18 NOTE — TELEPHONE ENCOUNTER
Called patient with provider's instructions as written. Patient states that she will likely be coming back to [place] sooner than 3-4 weeks, so she will just get the vaccine here.   Patient declined the phone number for the COVID-19 vaccine appointment scheduling line -- she will look it up when she gets home.    ANGELIA Petersen RN  [facility], [place]

## 2021-03-18 NOTE — TELEPHONE ENCOUNTER
She should still get the vaccine.  Only get it in [place] if she plans on staying there for another 3-4 weeks for the 2nd dose.  I'm not sure if they can schedule the 2nd dose in [place] after giving her the first.    Villa Blake MD

## 2021-03-24 ENCOUNTER — TELEPHONE (OUTPATIENT)
Dept: PHARMACY | Facility: CLINIC | Age: 66
End: 2021-03-24

## 2021-04-13 ENCOUNTER — TELEPHONE (OUTPATIENT)
Dept: FAMILY MEDICINE | Facility: CLINIC | Age: 66
End: 2021-04-13

## 2021-04-13 NOTE — TELEPHONE ENCOUNTER
Reached out to patient to inquire further regarding her previous message. She stated that she is back from her vacation to [place] now - prior to leaving she was advised to contact Dr. Blake upon her arrival back to [place]. She then stated that she was unsure if she needs to make an appointment or what, but she wanted to touch base? Recent virtual visit (03/02/2021) states follow up appointment not until 09/02/2021. Routing to Dr. Blake to advise further.     Colleen Beltran, BSN RN  [facility], [place]

## 2021-04-13 NOTE — TELEPHONE ENCOUNTER
Reason for Call:  Other call back    Detailed comments: Pt calling for she was advised to contact her PCP once she arrives back in town and would like a call back     Phone Number Patient can be reached at: Home number on file [phone] (home)    Best Time: anytime    Can we leave a detailed message on this number? YES    Call taken on 4/13/2021 at 9:58 AM by Pito London

## 2021-04-15 ENCOUNTER — VIRTUAL VISIT (OUTPATIENT)
Dept: NEUROLOGY | Facility: CLINIC | Age: 66
End: 2021-04-15
Payer: COMMERCIAL

## 2021-04-15 ENCOUNTER — TELEPHONE (OUTPATIENT)
Dept: NEUROLOGY | Facility: CLINIC | Age: 66
End: 2021-04-15

## 2021-04-15 ENCOUNTER — TELEPHONE (OUTPATIENT)
Dept: FAMILY MEDICINE | Facility: CLINIC | Age: 66
End: 2021-04-15

## 2021-04-15 DIAGNOSIS — G43.009 MIGRAINE WITHOUT AURA AND WITHOUT STATUS MIGRAINOSUS, NOT INTRACTABLE: Primary | ICD-10-CM

## 2021-04-15 PROCEDURE — 99203 OFFICE O/P NEW LOW 30 MIN: CPT | Mod: TEL | Performed by: PSYCHIATRY & NEUROLOGY

## 2021-04-15 RX ORDER — RIZATRIPTAN BENZOATE 10 MG/1
10 TABLET ORAL
Qty: 9 TABLET | Refills: 3 | Status: SHIPPED | OUTPATIENT
Start: 2021-04-15 | End: 2021-09-22

## 2021-04-15 RX ORDER — RIZATRIPTAN BENZOATE 5 MG/1
10 TABLET ORAL
Qty: 9 TABLET | Refills: 3 | Status: SHIPPED | OUTPATIENT
Start: 2021-04-15 | End: 2021-04-15

## 2021-04-15 RX ORDER — TOPIRAMATE 100 MG/1
100 CAPSULE, EXTENDED RELEASE ORAL DAILY
Qty: 30 CAPSULE | Refills: 2 | Status: SHIPPED | OUTPATIENT
Start: 2021-04-15

## 2021-04-15 NOTE — TELEPHONE ENCOUNTER
[facility] calls regarding rizatriptan (Maxalt) Rx sent today.  They need max tabs per day and per month please.     Leila Salmeron RN  [facility]

## 2021-04-15 NOTE — TELEPHONE ENCOUNTER
Prior Authorization Retail Medication Request    CoverMyMeds key: [id number]    Medication/Dose: Topiramate ER 100mg  ICD code (if different than what is on RX):  same  Previously Tried and Failed:      Depakote- became less effective over time  Topiramate regular strength- had nausea and vomiting  after dose was increased for effectiveness.      Rationale:  N&V resolved with ER dosing, when she's able to take less medication.  Has been on topiramate since 2012; has controlled her headaches.    Insurance Name:  [employer]  Insurance ID:  [id number]       Pharmacy Information (if different than what is on RX)  Name:  same  Phone:  same

## 2021-04-15 NOTE — PROGRESS NOTES
Farzana is a 65 year old who is being evaluated via a billable telephone visit.      What phone number would you like to be contacted at? [phone]  How would you like to obtain your AVS? Mail a copy    Assessment & Plan    Encounter Diagnosis   Name Primary?     Migraine without aura and without status migrainosus, not intractable Yes       Ms. Hughes is a pleasant 65-year-old woman with a long history of migraine headaches.  These have been successfully treated for several years with topiramate.  She requires less in terms of dosing on the extended release formulation, so I think we should continue this.  The headaches also respond to the Maxalt.  The purpose of today's visit was for Farzana to be able to reconnect with a neurologist as she is no longer able to follow-up with her previous neurologist.  I would like to obtain her interim neurology records from [facility].  We will plan on following up in clinic in a couple of months for an exam.  Farzana expressed understanding and agreement with the plan.    Patient Instructions:  -- Continue the long-acting Topamax: 100mg daily.  -- Continue the Maxalt (rivastigmine) as needed for migraine symptoms.  -- Make an appointment to follow-up in neurology clinic in 2 months.  Please request a 40-minute appointment slot since this will be our first in-person visit.  -- We will also try to get your [facility] records in the meantime.    30 minutes spent on the date of the encounter doing chart review, history and exam, documentation and further activities per the note.    No follow-ups on file.    Nivia Caputo MD  [facility] [place]    Subjective   Farzana is a 65 year old who presents for the following health issues: Migraine headaches.     HPI     Per chart review, patient previously followed with Dr. Edwards for management of her headaches.  She described frontal headaches and pain above the left eye.    History per his initial consultation  note (8.17.10):   Farzana Hughes is a 54-year-old right-handed female seen at the request of Dr. Jillian Carson regarding headaches and also question of a history of a possible stroke in the past and seizures.  She says she has had headaches since the age of 19.  She will run into problems in terms of headaches that will involve her entire head.  She describes it as it can be severe and it will feel like a sledge hammer hitting her head.  She will get light sensitive and sound sensitive with nausea with emesis.  She reports she has been on Depakote for a 9-year period of time and her  who is with her says that since she has been on Depakote there has been a dramatic reduction of the severe headaches.  She has taken Imitrex in the past but has had concerns in terms of cost with taking of Imitrex on a real regular basis and frequently will take over-the-counter medications to try to break the headache thought it is not as severe.  She says she gets headaches approximately once or twice a week and typically will take Extra Strength Tylenol, she says that it works about 20% of the time.  From her 's description it sounds like she has not had the very severe headaches for a prolonged period of time that she has needed to use Imitrex.  There has been no foods such as cheese, chocolate, red wine, smoked meats that provoke headaches.  There is no relationship to her menses.  She has not had any loss of vision or double vision.  No speech problems.  Has had some difficulty with swallowing.  In addition, there is a question of a history of a stroke 9 years ago which she related to when she was taking some Vioxx when she had numbness on the right side of her body.  She was hospitalized at [facility] for a week's time by report.  She says the records were inconclusive.  There was also a question of whether she may have had seizures.  She says that she had been evaluated at the [facility] for this and by  "what she describes it sounds as if she was diagnosed with nonepileptic events approximately 7 years ago.  She has not had any staring spells, lip smacking episodes, eyelid fluttering.  She in addition has problems with numbness of her right hand.  This has gone on for a couple of months' time.  It typically was in the morning after sleeping.  She does get some occasional neck pain, does not run into problems of shooting pain down the arm.  She says her coordination has been okay.  In addition, she has also had problems with pain with walking, she has pain in her legs with walking.  She initially said that there did not appear to be any difference if she is leaning forward, but she does say that she tends to feel better if she pushes a shopping cart.  She does have back pain.     She did well on Depakote for a couple of years, but then her headaches started to become more frequent again.  Dr. Edwards started the patient on Topamax in 3.2012.  It appears that he last saw her in 2015 and she was still on Topamax at that time and doing well from a migraine standpoint.  Brain MRI was unremarkable in 2012.    She is currently on 100mg of Topamax daily and Maxalt as needed.  Farzana says that she was previously following with Dr. Burgess (through [facility]) until recently. She also sees  Friday for restless legs syndrome and sleep apnea.  Farzana mentions that she was told her sleep apnea is mild enough that she does not need a CPAP.    She confirms that the Topamax works well for headaches. She recently switched to the long-acting, because she was requiring 100mg twice daily of the short acting formulation. She denies side effects.      Farzana asks her  to explain her headaches to me. He says that she is very sensitive to lights, has to rest and is in severe pain with her headaches. She sometimes has nausea/vomiting. She gets very grumpy,\" growls\" at her. She is also sensitive to sounds, so he tries to stay very quiet. " She says that the Maxalt still works to make the headaches less-severe, this really helps. She also takes Advil as needed for her headaches.    She says she previously had 3-4 migraines per month. Since starting the Topamax, she has about one headache every 3 or 4 months.  If she were to take nothing the headaches can last several days.  No recent change in her headache characteristics.    Review of Systems   Negative except as above.      Objective         Vitals:  No vitals were obtained today due to virtual visit.    Physical Exam   Alert and attentive. Speech is fluent. She is slightly tangential. Fair fund of knowledge.      Relevant Data:  MRI brain (3.31.12):  FINDINGS:  The brain parenchyma, ventricles and subarachnoid spaces   appear normal for age.  There is no evidence of hemorrhage, mass,   acute infarct, or anomaly. There are no gadolinium enhancing lesions.   The right sphenoid sinus is completely opacified.  The arteries at the   base of the brain and the dural venous sinuses appear patent.       IMPRESSION: No acute pathology is identified. No bleed, mass, or acute   infarct is seen.    Imaging reviewed independently by me.  Agree with radiology read.    Phone call duration: 12 minutes.    CC: Villa Barakat MD    Dragon software used in dictation of this note.

## 2021-04-15 NOTE — LETTER
[facility] [place]  [address] [address]  [phone]          2021    [facility]  [address] [address]  [place] [address]    Regarding Farzana Hughes  LINDA 59  ID #[id number]         Dear Representative,    I am writing you in regard to the recent denial of prior authorization for Ms. Hughes's Extended Release Topiramate.    Ms. Hughes has been suffering from migraines since she was 19 years old.  Her treatment history includes a 9 year duration taking Depakote.  Ms. Hughes reports that this was very effective in controlling her migraines in the beginning, but became less effective over time.  When her migraines began returning more regularly, she was switched to Topiramate.  She had fairly good migraine control with this medication, except for some apparent GI upset that resolved with ER dosing, when she's able to take less medication.  This change was made in 2020, by Ms. Hughes's neurologist at the time, Dr. Sue Brewster.  She reports that her migraines have been well-controlled since that time.      Ms. Hughes is also taking Maxalt for abortive treatment.  She had tried Imitrex in the past, but found the cost prohibitive.    I ask that you reconsider the PA denial for Ms. Hughes, given that she's done well on this formulation for the past 8 months, in terms of compliance, migraine control and tolerance.        Sincerely,         Nivia Caputo MD  Neurology

## 2021-04-15 NOTE — PATIENT INSTRUCTIONS
Patient Instructions:  -- Continue the long-acting Topamax: 100mg daily.  -- Continue the Maxalt (rivastigmine) as needed for migraine symptoms.  -- Make an appointment to follow-up in neurology clinic in 2 months.  Please request a 40-minute appointment slot since this will be our first in-person visit.  -- We will also try to get your [facility] records in the meantime.

## 2021-04-15 NOTE — NURSING NOTE
AVS mailed to patient.    MALLIKA for [facility] mailed to patient, along with self-addressed envelope.    04/28/21 MALLIKA faxed to [facility].  [phone].  Transmission confirmed via Right Fax.

## 2021-04-15 NOTE — LETTER
4/15/2021         RE: Farzana Hughes  [address]  [place] [address]        Dear Colleague,    Thank you for referring your patient, Farzana Hughes, to the [facility] [place]. Please see a copy of my visit note below.    Farzana is a 65 year old who is being evaluated via a billable telephone visit.      What phone number would you like to be contacted at? [phone]  How would you like to obtain your AVS? Mail a copy    Assessment & Plan    Encounter Diagnosis   Name Primary?     Migraine without aura and without status migrainosus, not intractable Yes       Ms. Hughes is a pleasant 65-year-old woman with a long history of migraine headaches.  These have been successfully treated for several years with topiramate.  She requires less in terms of dosing on the extended release formulation, so I think we should continue this.  The headaches also respond to the Maxalt.  The purpose of today's visit was for Farzana to be able to reconnect with a neurologist as she is no longer able to follow-up with her previous neurologist.  I would like to obtain her interim neurology records from [facility].  We will plan on following up in clinic in a couple of months for an exam.  Farzana expressed understanding and agreement with the plan.    Patient Instructions:  -- Continue the long-acting Topamax: 100mg daily.  -- Continue the Maxalt (rivastigmine) as needed for migraine symptoms.  -- Make an appointment to follow-up in neurology clinic in 2 months.  Please request a 40-minute appointment slot since this will be our first in-person visit.  -- We will also try to get your [facility] records in the meantime.    30 minutes spent on the date of the encounter doing chart review, history and exam, documentation and further activities per the note.    No follow-ups on file.    Nivia Caputo MD  [facility] [place]    Subjective   Farzana is a 65 year old who  presents for the following health issues: Migraine headaches.     HPI     Per chart review, patient previously followed with Dr. Edwards for management of her headaches.  She described frontal headaches and pain above the left eye.    History per his initial consultation note (8.17.10):   Farzana Hughes is a 54-year-old right-handed female seen at the request of Dr. Jillian Carson regarding headaches and also question of a history of a possible stroke in the past and seizures.  She says she has had headaches since the age of 19.  She will run into problems in terms of headaches that will involve her entire head.  She describes it as it can be severe and it will feel like a sledge hammer hitting her head.  She will get light sensitive and sound sensitive with nausea with emesis.  She reports she has been on Depakote for a 9-year period of time and her  who is with her says that since she has been on Depakote there has been a dramatic reduction of the severe headaches.  She has taken Imitrex in the past but has had concerns in terms of cost with taking of Imitrex on a real regular basis and frequently will take over-the-counter medications to try to break the headache thought it is not as severe.  She says she gets headaches approximately once or twice a week and typically will take Extra Strength Tylenol, she says that it works about 20% of the time.  From her 's description it sounds like she has not had the very severe headaches for a prolonged period of time that she has needed to use Imitrex.  There has been no foods such as cheese, chocolate, red wine, smoked meats that provoke headaches.  There is no relationship to her menses.  She has not had any loss of vision or double vision.  No speech problems.  Has had some difficulty with swallowing.  In addition, there is a question of a history of a stroke 9 years ago which she related to when she was taking some Vioxx when she had numbness on the right side of  her body.  She was hospitalized at [facility] for a week's time by report.  She says the records were inconclusive.  There was also a question of whether she may have had seizures.  She says that she had been evaluated at the [facility] for this and by what she describes it sounds as if she was diagnosed with nonepileptic events approximately 7 years ago.  She has not had any staring spells, lip smacking episodes, eyelid fluttering.  She in addition has problems with numbness of her right hand.  This has gone on for a couple of months' time.  It typically was in the morning after sleeping.  She does get some occasional neck pain, does not run into problems of shooting pain down the arm.  She says her coordination has been okay.  In addition, she has also had problems with pain with walking, she has pain in her legs with walking.  She initially said that there did not appear to be any difference if she is leaning forward, but she does say that she tends to feel better if she pushes a shopping cart.  She does have back pain.     She did well on Depakote for a couple of years, but then her headaches started to become more frequent again.  Dr. Edwards started the patient on Topamax in 3.2012.  It appears that he last saw her in 2015 and she was still on Topamax at that time and doing well from a migraine standpoint.  Brain MRI was unremarkable in 2012.    She is currently on 100mg of Topamax daily and Maxalt as needed.  Farzana says that she was previously following with Dr. Burgess (through [facility]) until recently. She also sees  Friday for restless legs syndrome and sleep apnea.  Farzana mentions that she was told her sleep apnea is mild enough that she does not need a CPAP.    She confirms that the Topamax works well for headaches. She recently switched to the long-acting, because she was requiring 100mg twice daily of the short acting formulation. She denies side effects.      Farzana asks her  to  "explain her headaches to me. He says that she is very sensitive to lights, has to rest and is in severe pain with her headaches. She sometimes has nausea/vomiting. She gets very grumpy,\" growls\" at her. She is also sensitive to sounds, so he tries to stay very quiet. She says that the Maxalt still works to make the headaches less-severe, this really helps. She also takes Advil as needed for her headaches.    She says she previously had 3-4 migraines per month. Since starting the Topamax, she has about one headache every 3 or 4 months.  If she were to take nothing the headaches can last several days.  No recent change in her headache characteristics.    Review of Systems   Negative except as above.      Objective         Vitals:  No vitals were obtained today due to virtual visit.    Physical Exam   Alert and attentive. Speech is fluent. She is slightly tangential. Fair fund of knowledge.      Relevant Data:  MRI brain (3.31.12):  FINDINGS:  The brain parenchyma, ventricles and subarachnoid spaces   appear normal for age.  There is no evidence of hemorrhage, mass,   acute infarct, or anomaly. There are no gadolinium enhancing lesions.   The right sphenoid sinus is completely opacified.  The arteries at the   base of the brain and the dural venous sinuses appear patent.       IMPRESSION: No acute pathology is identified. No bleed, mass, or acute   infarct is seen.    Imaging reviewed independently by me.  Agree with radiology read.    Phone call duration: 12 minutes.    CC: Villa Barakat MD    Dragon software used in dictation of this note.        Again, thank you for allowing me to participate in the care of your patient.        Sincerely,        Nivia Caputo MD    "

## 2021-04-15 NOTE — TELEPHONE ENCOUNTER
Team, please assist with scheduling. Thanks.    Jimena Ordoñez RN  [facility]

## 2021-04-15 NOTE — TELEPHONE ENCOUNTER
PA Initiation    Medication: Topiramate ER 100mg- INITIATED  Insurance Company: [employer] ([facility]) - Phone [phone] Fax [phone]  Pharmacy Filling the Rx: [facility] [address] [place] [address]  Filling Pharmacy Phone: [phone]  Filling Pharmacy Fax: [phone]  Start Date: 4/15/2021

## 2021-04-15 NOTE — TELEPHONE ENCOUNTER
PRIOR AUTHORIZATION DENIED    Medication: Topiramate ER 100mg- DENIED    Denial Date: 4/15/2021    Denial Rational: HAS NOT FAILED 3 OF THE FORMULARY DRUGS LISTED BELOW:          Appeal Information:               Central Prior Authorization Team  Phone: [phone]

## 2021-04-16 DIAGNOSIS — L29.9 ITCHING: ICD-10-CM

## 2021-04-16 DIAGNOSIS — E11.9 TYPE 2 DIABETES, DIET CONTROLLED (H): ICD-10-CM

## 2021-04-16 DIAGNOSIS — J45.40 MODERATE PERSISTENT ASTHMA WITHOUT COMPLICATION: ICD-10-CM

## 2021-04-16 DIAGNOSIS — J45.40 MODERATE PERSISTENT ASTHMA, UNCOMPLICATED: ICD-10-CM

## 2021-04-16 DIAGNOSIS — I10 HYPERTENSION GOAL BP (BLOOD PRESSURE) < 130/80: ICD-10-CM

## 2021-04-16 DIAGNOSIS — E78.5 HYPERLIPIDEMIA LDL GOAL <130: ICD-10-CM

## 2021-04-16 DIAGNOSIS — G43.009 MIGRAINE WITHOUT AURA AND WITHOUT STATUS MIGRAINOSUS, NOT INTRACTABLE: ICD-10-CM

## 2021-04-16 DIAGNOSIS — K21.9 GASTROESOPHAGEAL REFLUX DISEASE WITHOUT ESOPHAGITIS: ICD-10-CM

## 2021-04-16 RX ORDER — LISINOPRIL 20 MG/1
TABLET ORAL
Qty: 45 TABLET | Refills: 3 | OUTPATIENT
Start: 2021-04-16

## 2021-04-16 RX ORDER — METFORMIN HCL 500 MG
500 TABLET, EXTENDED RELEASE 24 HR ORAL
Qty: 90 TABLET | Refills: 1 | OUTPATIENT
Start: 2021-04-16

## 2021-04-16 RX ORDER — MONTELUKAST SODIUM 10 MG/1
TABLET ORAL
Qty: 90 TABLET | Refills: 1 | OUTPATIENT
Start: 2021-04-16

## 2021-04-16 RX ORDER — DOXEPIN HYDROCHLORIDE 10 MG/1
10 CAPSULE ORAL AT BEDTIME
Qty: 90 CAPSULE | Refills: 2 | OUTPATIENT
Start: 2021-04-16

## 2021-04-16 RX ORDER — TOPIRAMATE 100 MG/1
100 CAPSULE, EXTENDED RELEASE ORAL DAILY
Qty: 30 CAPSULE | Refills: 2 | Status: CANCELLED | OUTPATIENT
Start: 2021-04-16

## 2021-04-16 RX ORDER — OMEPRAZOLE 40 MG/1
CAPSULE, DELAYED RELEASE ORAL
Qty: 90 CAPSULE | Refills: 1 | OUTPATIENT
Start: 2021-04-16

## 2021-04-16 RX ORDER — RIZATRIPTAN BENZOATE 10 MG/1
10 TABLET ORAL
Qty: 9 TABLET | Refills: 3 | Status: CANCELLED | OUTPATIENT
Start: 2021-04-16

## 2021-04-16 RX ORDER — ALBUTEROL SULFATE 90 UG/1
2 AEROSOL, METERED RESPIRATORY (INHALATION) EVERY 4 HOURS PRN
Status: CANCELLED | OUTPATIENT
Start: 2021-04-16

## 2021-04-16 RX ORDER — ATORVASTATIN CALCIUM 40 MG/1
40 TABLET, FILM COATED ORAL DAILY
Qty: 90 TABLET | Refills: 0 | Status: SHIPPED | OUTPATIENT
Start: 2021-04-16 | End: 2021-04-23

## 2021-04-16 NOTE — TELEPHONE ENCOUNTER
RN sees this was addressed by provider and new Rx sent, so closing encounter.     Leila Salmeron RN  [facility]

## 2021-04-16 NOTE — TELEPHONE ENCOUNTER
Patient called the clinic.  She reports that she had all her refills sent to [facility] while she was there for a month with spouse.    Patient states that the [facility] in [facility] is not going to transfer the remaining refills back to [place].    Patient is requesting refills for all her medications.

## 2021-04-16 NOTE — TELEPHONE ENCOUNTER
Prescription approved per [facility] Refill Protocol.  Yaritza Powers RN

## 2021-04-23 ENCOUNTER — OFFICE VISIT (OUTPATIENT)
Dept: FAMILY MEDICINE | Facility: CLINIC | Age: 66
End: 2021-04-23
Payer: COMMERCIAL

## 2021-04-23 VITALS
DIASTOLIC BLOOD PRESSURE: 82 MMHG | WEIGHT: 189.7 LBS | BODY MASS INDEX: 37.05 KG/M2 | TEMPERATURE: 97.7 F | SYSTOLIC BLOOD PRESSURE: 124 MMHG | HEART RATE: 78 BPM | OXYGEN SATURATION: 98 %
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
## 2021-04-23 DIAGNOSIS — E78.5 HYPERLIPIDEMIA LDL GOAL <130: ICD-10-CM

## 2021-04-23 DIAGNOSIS — L29.9 ITCHING: ICD-10-CM

## 2021-04-23 DIAGNOSIS — E66.01 MORBID OBESITY (H): ICD-10-CM

## 2021-04-23 DIAGNOSIS — S91.051A CAT BITE OF RIGHT ANKLE, INITIAL ENCOUNTER: Primary | ICD-10-CM

## 2021-04-23 DIAGNOSIS — E11.649 UNCONTROLLED TYPE 2 DIABETES MELLITUS WITH HYPOGLYCEMIA WITHOUT COMA (H): ICD-10-CM

## 2021-04-23 LAB — HBA1C MFR BLD: 6.3 % (ref 0–5.6)

## 2021-04-23 PROCEDURE — 36415 COLL VENOUS BLD VENIPUNCTURE: CPT | Performed by: FAMILY MEDICINE

## 2021-04-23 PROCEDURE — 99214 OFFICE O/P EST MOD 30 MIN: CPT | Performed by: FAMILY MEDICINE

## 2021-04-23 PROCEDURE — 83036 HEMOGLOBIN GLYCOSYLATED A1C: CPT | Performed by: FAMILY MEDICINE

## 2021-04-23 RX ORDER — DOXEPIN HYDROCHLORIDE 10 MG/1
10 CAPSULE ORAL AT BEDTIME
Qty: 90 CAPSULE | Refills: 2 | Status: SHIPPED | OUTPATIENT
Start: 2021-04-23 | End: 2022-05-18

## 2021-04-23 RX ORDER — ATORVASTATIN CALCIUM 40 MG/1
40 TABLET, FILM COATED ORAL DAILY
Qty: 90 TABLET | Refills: 3 | Status: SHIPPED | OUTPATIENT
Start: 2021-04-23 | End: 2022-05-03

## 2021-04-23 NOTE — PROGRESS NOTES
Assessment & Plan       ICD-10-CM    1. Cat bite of right ankle, initial encounter  S91.051A amoxicillin-clavulanate (AUGMENTIN) 875-125 MG tablet    W55.01XA    2. Uncontrolled type 2 diabetes mellitus with hypoglycemia without coma (H)  E11.649 Hemoglobin A1c   3. Itching  L29.9 doxepin (SINEQUAN) 10 MG capsule   4. Morbid obesity (H)  E66.01    5. Hyperlipidemia LDL goal <130  E78.5 atorvastatin (LIPITOR) 40 MG tablet     Wound dressed  Antibiotics x 14 days  Go to the ED if erythema/swelling worsen over the weekend while taking antibiotics  Medication refills    Review of external notes as documented elsewhere in note          Return in about 1 week (around 4/30/2021) for For Re-Assessment.    Villa Blake MD  [facility] is a 65 year old who presents for the following health issues     HPI     Diabetes Follow-up    How often are you checking your blood sugar? Two times daily  Blood sugar testing frequency justification:  Uncontrolled diabetes  What time of day are you checking your blood sugars (select all that apply)?  Before meals and At bedtime  Have you had any blood sugars above 200?  Yes rare  Have you had any blood sugars below 70?  No    What symptoms do you notice when your blood sugar is low?  None    What concerns do you have today about your diabetes? None     Do you have any of these symptoms? (Select all that apply)  No numbness or tingling in feet.  No redness, sores or blisters on feet.  No complaints of excessive thirst.  No reports of blurry vision.  No significant changes to weight.    Have you had a diabetic eye exam in the last 12 months? No        BP Readings from Last 2 Encounters:   04/23/21 124/82   01/20/21 130/83     Hemoglobin A1C (%)   Date Value   10/28/2020 6.5 (H)   07/31/2020 6.6 (H)     LDL Cholesterol Calculated (mg/dL)   Date Value   10/28/2020 101 (H)   07/31/2020 94               Hypertension Follow-up      Do you check  your blood pressure regularly outside of the clinic? No     Are you following a low salt diet? No    Are your blood pressures ever more than 140 on the top number (systolic) OR more   than 90 on the bottom number (diastolic), for example 140/90? No      How many servings of fruits and vegetables do you eat daily?  2-3    On average, how many sweetened beverages do you drink each day (Examples: soda, juice, sweet tea, etc.  Do NOT count diet or artificially sweetened beverages)?   0    How many days per week do you exercise enough to make your heart beat faster? 3 or less    How many minutes a day do you exercise enough to make your heart beat faster? 9 or less  How many days per week do you miss taking your medication? 1    What makes it hard for you to take your medications?  remembering to take    Concern - Cat bite  Onset: Today  Description: Painful, swelling  Intensity: moderate  Progression of Symptoms:  worsening  Accompanying Signs & Symptoms: Hard to walk  Previous history of similar problem:   Precipitating factors:        Worsened by: Walking  Alleviating factors:        Improved by: Sitting  Therapies tried and outcome: Cleaned with soap and water      Cat bite occurred today  Posterior aspect of right ankle  Cat's vaccines are up to date per patient  Has pain and swelling of ankle        Review of Systems   Constitutional, HEENT, cardiovascular, pulmonary, gi and gu systems are negative, except as otherwise noted.      Objective    /82   Pulse 78   Temp 97.7  F (36.5  C) (Oral)   Wt 86 kg (189 lb 11.2 oz)   SpO2 98%   BMI 37.05 kg/m    Body mass index is 37.05 kg/m .  Physical Exam   GENERAL: healthy, alert and no distress  EYES: Eyes grossly normal to inspection, PERRL and conjunctivae and sclerae normal  NECK: no adenopathy, no asymmetry, masses, or scars and thyroid normal to palpation  MS: puncture wound x 4 on either side of right achilles above heel, closed, tender, without surrounding  erythema, normal resisted flexion/extension of right foot.  SKIN: no suspicious lesions or rashes  PSYCH: mentation appears normal, affect normal/bright

## 2021-04-23 NOTE — PATIENT INSTRUCTIONS
Patient Education     Cat Bite    A cat bite can cause a wound deep enough to break the skin. In such cases, the wound is cleaned and then sometimes closed. If the wound is closed it is usually not closed completely. This is so that fluid can drain if the wound becomes infected. Often the wound is left open to heal. In addition to wound care, a tetanus shot may be given, if needed.  Home care    Wash your hands well with soap and warm water before and after caring for the wound. This helps lower the risk of infection.    Care for the wound as directed. If a dressing was applied to the wound, be sure to change it as directed.    If the wound bleeds, place a clean, soft cloth on the wound. Then firmly apply pressure until the bleeding stops. This may take up to 5 minutes. Don't release the pressure and look at the wound during this time.    Always get medical attention for cat bites on the hand. They are highly likely to become infected.    Most wounds heal within 10 days. But an infection can occur even with proper treatment. So be sure to check the wound daily for signs of infection (see below).    Antibiotics may be prescribed. These help prevent or treat infection. If you re given antibiotics, take them as directed. Also be sure to complete the medicines.  Rabies prevention  Rabies is a virus that can be carried in certain animals. These can include domestic animals such as cats and dogs. Pets fully vaccinated against rabies (2 shots) are at very low risk of infection. But because human rabies is almost always fatal, any biting pet should be confined for 10 days as an extra precaution. In general, if there is a risk for rabies, the following steps may need to be taken:    If someone s pet cat has bitten you, it should be kept in a secure area for the next 10 days to watch for signs of illness. If the pet owner won t allow this, contact your local animal control center. If the cat becomes ill or dies during that  time, contact your local animal control center at once so the animal may be tested for rabies. If the cat stays healthy for the next 10 days, there is no danger of rabies in the animal or you.    If a stray cat bit you, contact your local animal control center. They can give information on capture, quarantine, and animal rabies testing.    If you can t find the animal that bit you in the next 2 days, and if rabies exists in your area, you may need to receive the rabies vaccine series. Call your healthcare provider right away. Or return to the emergency department promptly.    All animal bites should be reported to the local animal control center. If you were not given a form to fill out, you can report this yourself.  Follow-up care  Follow up with your healthcare provider, or as directed.  When to seek medical advice  Call your healthcare provider right away if any of these occur:    Signs of infection:  ? Spreading redness or warmth from the wound  ? Increased pain or swelling  ? Fever of 100.4 F (38 C) or higher, or as directed by your healthcare provider  ? Colored fluid or pus draining from the wound  ? Enlarged lymph nodes above the area that was bitten, such as lymph nodes in the armpit if you were bitten on the hand or arm. This may be a sign of cat-scratch disease (cat-scratch fever).    Signs of rabies infection:  ? Headache  ? Confusion  ? Strange behavior  ? Increased salivating or drooling  ? Seizure    Decreased ability to move any body part near the bite area    Bleeding that can't be stopped after 5 minutes of firm pressure  [employer] last reviewed this educational content on 5/1/2018 2000-2021 [employer]. All rights reserved. This information is not intended as a substitute for professional medical care. Always follow your healthcare professional's instructions.

## 2021-04-26 ENCOUNTER — TELEPHONE (OUTPATIENT)
Dept: FAMILY MEDICINE | Facility: CLINIC | Age: 66
End: 2021-04-26

## 2021-04-26 DIAGNOSIS — E11.9 TYPE 2 DIABETES, DIET CONTROLLED (H): ICD-10-CM
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
## 2021-04-26 NOTE — TELEPHONE ENCOUNTER
Letter faxed to [facility] Appeals [phone].  Transmission confirmed via Right Fax.

## 2021-04-26 NOTE — TELEPHONE ENCOUNTER
Received call from patient. She stated that she saw Dr. Blake 04/23/2021 for cat bite on heel. She is looking for further recommendations to help with pain at the site- it is painful when walking. Not worsening, however it is not getting any better. She is taking amoxicillin-clavulanate (AUGMENTIN) 875-125 MG tablet as prescribed. No signs of infection. Patient is already applying ice and taking Ibuprofen.    Routing to provider to please advise.    JOHN PetersenN TEJ  [facility]

## 2021-04-26 NOTE — TELEPHONE ENCOUNTER
Reason for Call:  Other     Detailed comments: Patient is having issues regarding a cat bite to the heel that she stated provider is aware of. Please advise    Phone Number Patient can be reached at: Cell number on file:    Telephone Information:   Mobile [phone]       Best Time:     Can we leave a detailed message on this number? YES    Call taken on 4/26/2021 at 2:30 PM by Veronica Davila

## 2021-04-27 DIAGNOSIS — E11.9 TYPE 2 DIABETES MELLITUS WITHOUT COMPLICATION, WITHOUT LONG-TERM CURRENT USE OF INSULIN (H): ICD-10-CM

## 2021-04-28 RX ORDER — TRAMADOL HYDROCHLORIDE 50 MG/1
50 TABLET ORAL EVERY 6 HOURS PRN
Qty: 10 TABLET | Refills: 0 | Status: SHIPPED | OUTPATIENT
Start: 2021-04-28 | End: 2021-05-01

## 2021-04-28 NOTE — TELEPHONE ENCOUNTER
Patient notified pain medication was sent to her pharmacy. She also requested refill for her jesse sensor. See telephone note from 4/27. Freestyle jesse 14 day is not available. 4/27 message has been routed to provider.     Zenaida Boo RN

## 2021-05-11 ENCOUNTER — MYC MEDICAL ADVICE (OUTPATIENT)
Dept: CARDIOLOGY | Facility: CLINIC | Age: 66
End: 2021-05-11

## 2021-05-11 DIAGNOSIS — E11.649 UNCONTROLLED TYPE 2 DIABETES MELLITUS WITH HYPOGLYCEMIA WITHOUT COMA (H): Primary | ICD-10-CM

## 2021-05-11 NOTE — TELEPHONE ENCOUNTER
Received notice that the appeal for topiramate ER was approved.      [facility]  Transaction [id number]    A copy of the approval faxed to [facility]  [phone].  Transmission confirmed via Right Fax.    I will notify Farzana of the approval.

## 2021-05-11 NOTE — TELEPHONE ENCOUNTER
Routing refill request to provider for review/approval because:  Drug not on the [facility] refill protocol     Spoke with patient she state that she needs refills on Freestyle sensors and the test strips.

## 2021-05-13 RX ORDER — FLASH GLUCOSE SENSOR
KIT MISCELLANEOUS
Qty: 2 EACH | Refills: 11 | Status: SHIPPED | OUTPATIENT
Start: 2021-05-13 | End: 2021-11-03

## 2021-05-17 ENCOUNTER — TELEPHONE (OUTPATIENT)
Dept: PHARMACY | Facility: CLINIC | Age: 66
End: 2021-05-17

## 2021-05-17 NOTE — TELEPHONE ENCOUNTER
Called to schedule MTM appt, no answer, LVM.     Pamella Roland, PharmD  Medication Therapy Management Pharmacist  [phone]  [facility]: [phone]

## 2021-06-02 ENCOUNTER — VIRTUAL VISIT (OUTPATIENT)
Dept: PHARMACY | Facility: CLINIC | Age: 66
End: 2021-06-02

## 2021-06-02 DIAGNOSIS — J34.89 SINUS PRESSURE: ICD-10-CM

## 2021-06-02 DIAGNOSIS — K29.00 ACUTE GASTRITIS WITHOUT HEMORRHAGE, UNSPECIFIED GASTRITIS TYPE: ICD-10-CM

## 2021-06-02 DIAGNOSIS — E11.9 TYPE 2 DIABETES, DIET CONTROLLED (H): Primary | ICD-10-CM

## 2021-06-02 DIAGNOSIS — J31.0 NON-ALLERGIC RHINITIS: ICD-10-CM

## 2021-06-02 DIAGNOSIS — G43.809 OTHER MIGRAINE WITHOUT STATUS MIGRAINOSUS, NOT INTRACTABLE: ICD-10-CM

## 2021-06-02 DIAGNOSIS — G25.81 RESTLESS LEGS SYNDROME (RLS): ICD-10-CM

## 2021-06-02 DIAGNOSIS — I10 BENIGN ESSENTIAL HYPERTENSION: ICD-10-CM

## 2021-06-02 DIAGNOSIS — Z78.9 TAKES DIETARY SUPPLEMENTS: ICD-10-CM

## 2021-06-02 DIAGNOSIS — E78.5 HYPERLIPIDEMIA LDL GOAL <100: ICD-10-CM

## 2021-06-02 PROCEDURE — 99606 MTMS BY PHARM EST 15 MIN: CPT | Performed by: PHARMACIST

## 2021-06-02 PROCEDURE — 99607 MTMS BY PHARM ADDL 15 MIN: CPT | Performed by: PHARMACIST

## 2021-06-02 RX ORDER — BLOOD SUGAR DIAGNOSTIC
STRIP MISCELLANEOUS
Qty: 100 STRIP | Refills: 3 | Status: SHIPPED | OUTPATIENT
Start: 2021-06-02

## 2021-06-02 RX ORDER — TOPIRAMATE 25 MG/1
50 TABLET, FILM COATED ORAL 2 TIMES DAILY
COMMUNITY

## 2021-06-02 NOTE — PROGRESS NOTES
Medication Therapy Management (MTM) Encounter    ASSESSMENT:                            Medication Adherence/Access: No issues identified    Type 2 Diabetes: Stable. A1C at goal < 7%.    Hypertension: Stable. Blood pressure at goal < 140/90.    Hyperlipidemia: Stable.  Patient is on high intensity statin which is indicated based on 2019 ACC/AHA guidelines for lipid management.      Allergic Rhinitis: Stable.     RLS: Stable.     GERD: Stable.     Itching: Education on doxepin provided- likely not for itching.     Migraines: Plan in place    Supplements: Stable.     PLAN:                            1. Sent prescription for Freestyle test strips to [facility] to see if these are covered by your new insurance (otherwise it sounds like you are paying $19 for them).    Follow-up: No follow-ups on file.      SUBJECTIVE/OBJECTIVE:                          Farzana Hughes is a 65 year old female called for a follow-up visit. She was referred to me from Villa Barakat for diabetes.  Today's visit is a follow-up MTM visit from 10/12/21.     Reason for visit: med review. New insurance has been difficult to figure out.     Allergies/ADRs: Reviewed in chart  Tobacco: She reports that she has never smoked. She has never used smokeless tobacco.  Alcohol: none  Caffeine: no caffeine  Activity: minimal, up and down stairs at home, working on walking  PMH: Reviewed in EHR    Medication Adherence/Access:   Patient uses pill box(es).  Patient takes medications 2 time(s) per day. Noon and supper.  Per patient, misses medication morning 2 times per week.   The patient fills medications at [place]: NO, fills medications at [facility].    Type 2 Diabetes:   Metformin  mg once daily     has DM, is on U500 insulin.  Pt is experiencing the following side effects: none. Previously did not tolerate regular metformin.  Blood sugar monitoring: Freestyle Bull (she'd like to get prescription for Freestyle  test strips to use with meter, currently buying OTC) checking 1x/day. Ranges (patient reported):  usually, couple in 200s - because  fell, or she got stressed out.   Symptoms of low blood sugar? n/a  Symptoms of high blood sugar? none  Diet/Exercise: see above  Aspirin: Not taking due to allergy  Statin: Yes: atorvastatin   ACEi/ARB: Yes: lisinopril.   Urine Albumin:   Lab Results   Component Value Date    UMALCR Unable to calculate due to low value 11/02/2020     Lab Results   Component Value Date    A1C 6.3 04/23/2021    A1C 6.5 10/28/2020    A1C 6.6 07/31/2020    A1C 6.4 12/05/2019    A1C 6.5 07/30/2019     Hypertension:   Lisinopril 20 mg daily    Patient does not self-monitor blood pressure.  Patient reports no current medication side effects.  BP Readings from Last 3 Encounters:   04/23/21 124/82   01/20/21 130/83   11/02/20 114/84     Hyperlipidemia:   atorvastatin 40 mg daily    Patient reports no significant myalgias or other side effects.  Recent Labs   Lab Test 10/28/20  1122 07/31/20  1527 01/26/15  0924 01/26/15  0924 03/31/14  1621   CHOL 188 176   < > 147 146   HDL 50 49*   < > 48* 43*   * 94   < > 75 82   TRIG 184* 164*   < > 118 103   CHOLHDLRATIO  --   --   --  3.1 3.0    < > = values in this interval not displayed.     Allergic Rhinitis:   Singulair 10 mg at bedtime   Pataday 0.2% eye drops PRN  Flonase 1 spray each nostril daily PRN  Epipen PRN (no recent use) (shellfish and bees)     Prefers non-pharmacological options like warm washcloth.   Primary symptoms are post-nasal drip, itchy/watery eyes and cough. States this/these are effective. Denies side effects.      RLS:   Gabapentin 200 mg in the afternoon and 300 mg at bedtime.  cyclobenzaprine 5 mg TID PRN (rare to use)    States this/these are effective. Denies side effects. Cyclobenzaprine is helpful for RLS and headaches.     GERD:   Omeprazole 40 mg daily    History of non-bleeding ulcer years ago. Pt reports no current  symptoms.  Patient feels that current regimen is effective.    Itching:    Doxepin 10 mg at bedtime     She was under the impression this was for itching, she notes she also has depression and anxiety too. Sleep is so-so, worries about her  who has sleep apnea, also recently had a foot amputated.  Denies side effects.      Migraines:    Rizatriptan 10 mg PRN  topiramate  mg daily - her new insurance company refuses to pay for (~$700/month), she's taking some old topiramate 50 mg twice daily instead. She sees her new neurologist on 6/15 and will discuss with her then.     Supplements:   Garlic 1000 mg twice daily  Probiotic daily     No reported issues at this time.     Today's Vitals: There were no vitals taken for this visit.  ----------------      I spent 27 minutes with this patient today. All changes were made via collaborative practice agreement with Villa Barakat. A copy of the visit note was provided to the patient's primary care provider.    The patient was sent via [employer] a summary of these recommendations.     Pamella Roland, PharmD  Medication Therapy Management Pharmacist  [phone]    Telemedicine Visit Details  Type of service:  Telephone visit  Start Time: 1:31 PM  End Time: 1:58 PM  Originating Location (patient location): Home  Distant Location (provider location):  [facility]      Medication Therapy Recommendations  Type 2 diabetes, diet controlled (H)    Current Medication: blood glucose (FREESTYLE TEST STRIPS) test strip   Rationale: More cost-effective medication available - Cost - Adherence   Recommendation: Change Medication - sent as Rx   Status: Accepted per CPA

## 2021-06-02 NOTE — Clinical Note
JOANN LEOS note, thanks!    Pamella Roland, PharmD  Medication Therapy Management Pharmacist  [phone]

## 2021-06-15 ENCOUNTER — VIRTUAL VISIT (OUTPATIENT)
Dept: NEUROLOGY | Facility: CLINIC | Age: 66
End: 2021-06-15
Payer: COMMERCIAL

## 2021-06-15 VITALS — BODY MASS INDEX: 36.32 KG/M2 | WEIGHT: 185 LBS | HEIGHT: 60 IN

## 2021-06-15 DIAGNOSIS — Z79.899 ENCOUNTER FOR LONG-TERM (CURRENT) USE OF MEDICATIONS: ICD-10-CM

## 2021-06-15 DIAGNOSIS — G43.009 MIGRAINE WITHOUT AURA AND WITHOUT STATUS MIGRAINOSUS, NOT INTRACTABLE: Primary | ICD-10-CM

## 2021-06-15 PROCEDURE — 99214 OFFICE O/P EST MOD 30 MIN: CPT | Mod: TEL | Performed by: PSYCHIATRY & NEUROLOGY

## 2021-06-15 ASSESSMENT — MIFFLIN-ST. JEOR: SCORE: 1315.65

## 2021-06-15 NOTE — PATIENT INSTRUCTIONS
Plan:  -- Start Emgality (galcanezumab) injections: 240mg first dose, 120mg monthly thereafter.   -- Continue the Topamax for now: 50mg twice daily.   -- Continue the rizatriptan as needed for migraines.  -- Make an in-person appointment in Neurology clinic for 3 months from now. Please request a 40-minute appointment slot for first in-person visit.

## 2021-06-15 NOTE — LETTER
6/15/2021         RE: Farzana Hughes  [address] [facility] [place] [address]        Dear Colleague,    Thank you for referring your patient, Farzana Hughes, to the [facility]UR[facility] [place]. Please see a copy of my visit note below.    ESTABLISHED PATIENT NEUROLOGY NOTE    DATE OF VISIT: 6/15/2021  MRN: [id number]  PATIENT NAME: Farzana Hughes  YOB: 1955    Chief Complaint   Patient presents with     Headache     Restless legs     SUBJECTIVE:                                                      HISTORY OF PRESENT ILLNESS:  Farzana is here for follow up regarding migraine headache management.  Farzana has a long history of migraines and has done well for several years on long-acting topiramate.  She takes Maxalt as needed.  She was previously on Depakote and Imitrex for her migraines.  The headache started to become more frequent again after a couple of years on Depakote, which is why the Topamax was started in 2012.  She has additional history of restless leg syndrome for which she takes gabapentin.  She was previously followed at [facility] for this.  Brain MRI from 2012 was unremarkable.  No particular headache triggers.  She has additional history of sleep apnea, but this is reportedly mild so that she does not need CPAP.  I spoke with the patient a couple of months ago, she said that she has her typical migraine headaches about once every 3-4 months.  She reported that Maxalt was still helpful to make the headaches at least less severe.  She also was taking Advil as needed for headaches.  If she does not take anything to abort the headache, she told me the headaches can last for several days. There is history of possible stroke in 2001.     There is family history of brain aneurysm in the patient's sister, as well as stroke.  There is history of Alzheimer's in the patient's mother.  Parkinson's in the patient's sister.    Her insurance is no longer covering the long-acting  topiramate, so we have her on 50mg of the short acting formulation twice daily.    I spoke with Farzana over the phone.  She asked if this appointment could be switched to a virtual rather than our planned in-clinic visit.  She says her  is going through a bout of depression, so she has been more stressed and does not want to leave him alone. He had to have a foot removed, and the stump is not healing.  Because it is not healing, she says he cannot get his prosthetic yet.  This is causing him to be depressed.      She has been having more frequent headaches, but she is able to manage these for the most part. She has taken a couple of doses of her rizatriptan over the past few weeks. This helps as long as she rests after taking it.  Typically she will sleep for a while and then wake without the headache.  She says that there is some construction going on outside of her house and it has been very noisy.  This is also leading to increase in her stress and headaches.  She says she can sometimes sit on the bed and take some breaths to calm herself.     He asks about the new migraine medications that she has seen commercials for.  She says she has heard bad things about Botox. She recalls having trouble when she was trying to start the Topamax and taper off of the Depakote.   She tells me that her MyChart access is hit or miss.      We did not discuss her restless leg symptoms today.    Please see my previous consultation note dated 4.15.21 for additional historical information.    CURRENT MEDICATIONS:   albuterol (PROAIR HFA) 108 (90 Base) MCG/ACT inhaler, Inhale 2 puffs into the lungs every 4 hours as needed  atorvastatin (LIPITOR) 40 MG tablet, Take 1 tablet (40 mg) by mouth daily Generic please  blood glucose (FREESTYLE TEST STRIPS) test strip, Use to test blood sugar 1 times daily or as directed. To be used with Freestyle Bull CGM meter.  Continuous Blood Gluc Sensor (FREESTYLE BULL 14 DAY SENSOR) MISC,  Change every 14 days.  Continuous Blood Gluc Sensor (FREESTYLE LESLIE 2 SENSOR) [facility], 1 each every 14 days 1 each every 14 days. Change every 14 days.  cyclobenzaprine (FLEXERIL) 5 MG tablet, Take 1 tablet (5 mg) by mouth 3 times daily as needed for muscle spasms  doxepin (SINEQUAN) 10 MG capsule, Take 1 capsule (10 mg) by mouth At Bedtime  EPINEPHrine (EPIPEN 2-SALBADOR) 0.3 MG/0.3ML injection 2-pack, Inject 0.3 mLs (0.3 mg) into the muscle as needed for anaphylaxis  fluticasone (FLONASE) 50 MCG/ACT nasal spray, Spray 1 spray into both nostrils daily as needed for rhinitis or allergies  gabapentin (NEURONTIN) 100 MG capsule, Take 200 mg by mouth daily 100- 200mg in the Afternoon. Also takes 300 mg at bedtime  gabapentin (NEURONTIN) 300 MG capsule, Take 300 mg by mouth At Bedtime Also takes 200 mg in afternoon.  GARLIC 1000 MG OR CAPS, 1 tablet twice daily  ipratropium (ATROVENT) 0.06 % nasal spray, Spray 1-2 sprays into both nostrils 2 times daily  lisinopril (ZESTRIL) 20 MG tablet, TAKE HALF TABLET BY MOUTH DAILY  metFORMIN (GLUCOPHAGE-XR) 500 MG 24 hr tablet, Take 1 tablet (500 mg) by mouth daily (with dinner)  montelukast (SINGULAIR) 10 MG tablet, TAKE ONE TABLET BY MOUTH AT BEDTIME  olopatadine (PATADAY) 0.2 % ophthalmic solution, Place 1 drop into both eyes daily (Patient taking differently: Place 1 drop into both eyes daily as needed )  omeprazole (PRILOSEC) 40 MG DR capsule, TAKE 1 CAPSULE BY MOUTH DAILY. TAKE 30 TO 60 MINUTES BEFORE A MEAL  order for DME, Equipment being ordered: 4 wheeled walker replacement  Probiotic Product (ALIGN PO), Once daily  rizatriptan (MAXALT) 10 MG tablet, Take 1 tablet (10 mg) by mouth at onset of headache for migraine May repeat in 2 hours. Max 3 tablets/24 hours.  scopolamine (TRANSDERM-SCOP) 1.5 MG patch 72 hr, Place onto the skin every 72 hours (Patient taking differently: Place onto the skin every 72 hours Takes PRN for travel)  topiramate (TOPAMAX) 25 MG tablet, Take 50 mg by  mouth 2 times daily  order for DME, Equipment being ordered: Silicone heel cup (Patient not taking: Reported on 6/15/2021)  order for DME, Hinged knee brace - medium (Patient not taking: Reported on 6/15/2021)  order for DME, Equipment being ordered: Hinged knee brace (Patient not taking: Reported on 6/15/2021)  topiramate ER (QUDEXY XR) 100 MG 24 hr capsule, Take 1 capsule (100 mg) by mouth daily (Patient not taking: Reported on 4/23/2021)    No current facility-administered medications on file prior to visit.       RECENT DIAGNOSTIC STUDIES:   Labs: No results found for any visits on 06/15/21.      REVIEW OF SYSTEMS:                                                      10-point review of systems is negative except as mentioned above in HPI.    EXAM:                                                      Physical Exam:   Vitals: Ht 1.524 m (5')   Wt 83.9 kg (185 lb)   BMI 36.13 kg/m    BMI= Body mass index is 36.13 kg/m .  GENERAL: NAD.  HEENT: NC/AT.  CV: RRR. S1, S2.   NECK: No bruits.  PULM: Non-labored breathing.   Neurologic:  Alert, mostly attentive. Tangential. Speech is fluent. Adequate fund of knowledge.    ASSESSMENT and PLAN:                                                      Assessment:    ICD-10-CM    1. Migraine without aura and without status migrainosus, not intractable  G43.009 galcanezumab-gnlm (EMGALITY) 120 MG/ML injection     galcanezumab-gnlm (EMGALITY) 120 MG/ML injection   2. Encounter for long-term (current) use of medications  Z79.899         Ms. Hughes is a pleasant 66 yo woman followed in Neurology for migraine headaches.  Although we did not discuss this today, I have a feeling I will need to take over the prescription for Farzana's gabapentin for restless legs syndrome in the future as well.  Unfortunately, Farzana's insurance does not cover long-acting topiramate (which worked well for her in the past), so she is taking the short-acting formulation but her headaches are currently not  under good control.  This is in the setting of increased stress.  We discussed doing a trial of a CGRP inhibitor for headache prevention.  The plan will be to taper her off of the topiramate once she has started Emgality.  I explained to Farzana that there are short-acting formulations of this medication as well, but since her headaches respond well to the rizatriptan, I would not recommend trying a different abortive treatment at this time. I would like to see Farzana in clinic for our first in-persn visit in ~3 months. She understands and agrees with the plan.    Plan:  -- Start Emgality (galcanezumab) injections: 240mg first dose, 120mg monthly thereafter.   -- Continue the Topamax for now: 50mg twice daily.   -- Continue the rizatriptan as needed for migraines.  -- Make an in-person appointment in Neurology clinic for 3 months from now. Please request a 40-minute appointment slot for first in-person visit.     Total Time: 25 minutes were spent with the patient and in chart review/documentation (as described above in Assessment and Plan)/coordinating the care. Phone call duration: 13 minutes.     Nivia Caputo MD  Neurology    [employer] medical software used int he dictation of this note.                       Again, thank you for allowing me to participate in the care of your patient.        Sincerely,        Nivia Caputo MD

## 2021-06-15 NOTE — PROGRESS NOTES
ESTABLISHED PATIENT NEUROLOGY NOTE    DATE OF VISIT: 6/15/2021  MRN: [id number]  PATIENT NAME: Farzana Hughes  YOB: 1955    Chief Complaint   Patient presents with     Headache     Restless legs     SUBJECTIVE:                                                      HISTORY OF PRESENT ILLNESS:  Farzana is here for follow up regarding migraine headache management.  Farzana has a long history of migraines and has done well for several years on long-acting topiramate.  She takes Maxalt as needed.  She was previously on Depakote and Imitrex for her migraines.  The headache started to become more frequent again after a couple of years on Depakote, which is why the Topamax was started in 2012.  She has additional history of restless leg syndrome for which she takes gabapentin.  She was previously followed at [facility] for this.  Brain MRI from 2012 was unremarkable.  No particular headache triggers.  She has additional history of sleep apnea, but this is reportedly mild so that she does not need CPAP.  I spoke with the patient a couple of months ago, she said that she has her typical migraine headaches about once every 3-4 months.  She reported that Maxalt was still helpful to make the headaches at least less severe.  She also was taking Advil as needed for headaches.  If she does not take anything to abort the headache, she told me the headaches can last for several days. There is history of possible stroke in 2001.     There is family history of brain aneurysm in the patient's sister, as well as stroke.  There is history of Alzheimer's in the patient's mother.  Parkinson's in the patient's sister.    Her insurance is no longer covering the long-acting topiramate, so we have her on 50mg of the short acting formulation twice daily.    I spoke with Farzana over the phone.  She asked if this appointment could be switched to a virtual rather than our planned in-clinic visit.  She says her  is going through a bout  of depression, so she has been more stressed and does not want to leave him alone. He had to have a foot removed, and the stump is not healing.  Because it is not healing, she says he cannot get his prosthetic yet.  This is causing him to be depressed.      She has been having more frequent headaches, but she is able to manage these for the most part. She has taken a couple of doses of her rizatriptan over the past few weeks. This helps as long as she rests after taking it.  Typically she will sleep for a while and then wake without the headache.  She says that there is some construction going on outside of her house and it has been very noisy.  This is also leading to increase in her stress and headaches.  She says she can sometimes sit on the bed and take some breaths to calm herself.     He asks about the new migraine medications that she has seen commercials for.  She says she has heard bad things about Botox. She recalls having trouble when she was trying to start the Topamax and taper off of the Depakote.   She tells me that her MyChart access is hit or miss.      We did not discuss her restless leg symptoms today.    Please see my previous consultation note dated 4.15.21 for additional historical information.    CURRENT MEDICATIONS:   albuterol (PROAIR HFA) 108 (90 Base) MCG/ACT inhaler, Inhale 2 puffs into the lungs every 4 hours as needed  atorvastatin (LIPITOR) 40 MG tablet, Take 1 tablet (40 mg) by mouth daily Generic please  blood glucose (FREESTYLE TEST STRIPS) test strip, Use to test blood sugar 1 times daily or as directed. To be used with Freestyle Bull CGM meter.  Continuous Blood Gluc Sensor (FREESTYLE BULL 14 DAY SENSOR) MISC, Change every 14 days.  Continuous Blood Gluc Sensor (FREESTYLE BULL 2 SENSOR) MISC, 1 each every 14 days 1 each every 14 days. Change every 14 days.  cyclobenzaprine (FLEXERIL) 5 MG tablet, Take 1 tablet (5 mg) by mouth 3 times daily as needed for muscle spasms  doxepin  (SINEQUAN) 10 MG capsule, Take 1 capsule (10 mg) by mouth At Bedtime  EPINEPHrine (EPIPEN 2-SALBADOR) 0.3 MG/0.3ML injection 2-pack, Inject 0.3 mLs (0.3 mg) into the muscle as needed for anaphylaxis  fluticasone (FLONASE) 50 MCG/ACT nasal spray, Spray 1 spray into both nostrils daily as needed for rhinitis or allergies  gabapentin (NEURONTIN) 100 MG capsule, Take 200 mg by mouth daily 100- 200mg in the Afternoon. Also takes 300 mg at bedtime  gabapentin (NEURONTIN) 300 MG capsule, Take 300 mg by mouth At Bedtime Also takes 200 mg in afternoon.  GARLIC 1000 MG OR CAPS, 1 tablet twice daily  ipratropium (ATROVENT) 0.06 % nasal spray, Spray 1-2 sprays into both nostrils 2 times daily  lisinopril (ZESTRIL) 20 MG tablet, TAKE HALF TABLET BY MOUTH DAILY  metFORMIN (GLUCOPHAGE-XR) 500 MG 24 hr tablet, Take 1 tablet (500 mg) by mouth daily (with dinner)  montelukast (SINGULAIR) 10 MG tablet, TAKE ONE TABLET BY MOUTH AT BEDTIME  olopatadine (PATADAY) 0.2 % ophthalmic solution, Place 1 drop into both eyes daily (Patient taking differently: Place 1 drop into both eyes daily as needed )  omeprazole (PRILOSEC) 40 MG DR capsule, TAKE 1 CAPSULE BY MOUTH DAILY. TAKE 30 TO 60 MINUTES BEFORE A MEAL  order for DME, Equipment being ordered: 4 wheeled walker replacement  Probiotic Product (ALIGN PO), Once daily  rizatriptan (MAXALT) 10 MG tablet, Take 1 tablet (10 mg) by mouth at onset of headache for migraine May repeat in 2 hours. Max 3 tablets/24 hours.  scopolamine (TRANSDERM-SCOP) 1.5 MG patch 72 hr, Place onto the skin every 72 hours (Patient taking differently: Place onto the skin every 72 hours Takes PRN for travel)  topiramate (TOPAMAX) 25 MG tablet, Take 50 mg by mouth 2 times daily  order for DME, Equipment being ordered: Silicone heel cup (Patient not taking: Reported on 6/15/2021)  order for DME, Hinged knee brace - medium (Patient not taking: Reported on 6/15/2021)  order for DME, Equipment being ordered: Hinged knee brace  (Patient not taking: Reported on 6/15/2021)  topiramate ER (QUDEXY XR) 100 MG 24 hr capsule, Take 1 capsule (100 mg) by mouth daily (Patient not taking: Reported on 4/23/2021)    No current facility-administered medications on file prior to visit.       RECENT DIAGNOSTIC STUDIES:   Labs: No results found for any visits on 06/15/21.      REVIEW OF SYSTEMS:                                                      10-point review of systems is negative except as mentioned above in HPI.    EXAM:                                                      Physical Exam:   Vitals: Ht 1.524 m (5')   Wt 83.9 kg (185 lb)   BMI 36.13 kg/m    BMI= Body mass index is 36.13 kg/m .  GENERAL: NAD.  HEENT: NC/AT.  CV: RRR. S1, S2.   NECK: No bruits.  PULM: Non-labored breathing.   Neurologic:  Alert, mostly attentive. Tangential. Speech is fluent. Adequate fund of knowledge.    ASSESSMENT and PLAN:                                                      Assessment:    ICD-10-CM    1. Migraine without aura and without status migrainosus, not intractable  G43.009 galcanezumab-gnlm (EMGALITY) 120 MG/ML injection     galcanezumab-gnlm (EMGALITY) 120 MG/ML injection   2. Encounter for long-term (current) use of medications  Z79.899         Ms. Hughes is a pleasant 64 yo woman followed in Neurology for migraine headaches.  Although we did not discuss this today, I have a feeling I will need to take over the prescription for Farzana's gabapentin for restless legs syndrome in the future as well.  Unfortunately, Farzana's insurance does not cover long-acting topiramate (which worked well for her in the past), so she is taking the short-acting formulation but her headaches are currently not under good control.  This is in the setting of increased stress.  We discussed doing a trial of a CGRP inhibitor for headache prevention.  The plan will be to taper her off of the topiramate once she has started Emgality.  I explained to Farzana that there are  short-acting formulations of this medication as well, but since her headaches respond well to the rizatriptan, I would not recommend trying a different abortive treatment at this time. I would like to see Farzana in clinic for our first in-persn visit in ~3 months. She understands and agrees with the plan.    Plan:  -- Start Emgality (galcanezumab) injections: 240mg first dose, 120mg monthly thereafter.   -- Continue the Topamax for now: 50mg twice daily.   -- Continue the rizatriptan as needed for migraines.  -- Make an in-person appointment in Neurology clinic for 3 months from now. Please request a 40-minute appointment slot for first in-person visit.     Total Time: 25 minutes were spent with the patient and in chart review/documentation (as described above in Assessment and Plan)/coordinating the care. Phone call duration: 13 minutes.     Nivia Caputo MD  Neurology    [employer] software used int he dictation of this note.

## 2021-06-15 NOTE — NURSING NOTE
Chief Complaint   Patient presents with     Headache     Restless legs     Adelina Darby RN on 6/15/2021 at 10:33 AM

## 2021-06-16 ENCOUNTER — TELEPHONE (OUTPATIENT)
Dept: NEUROLOGY | Facility: CLINIC | Age: 66
End: 2021-06-16

## 2021-06-16 NOTE — TELEPHONE ENCOUNTER
PRIOR AUTHORIZATION DENIED    Medication: galcanezumab-gnlm (EMGALITY) 120 MG/ML injection -DENIED    Denial Date:      Denial Rational: PATIENT NEEDS TO MEET BELOW CRITERIA      Appeal Information:       d

## 2021-06-16 NOTE — LETTER
[facility] NEUROLOGY CLINIC [address] [address]  [phone]    2021    [employer]  Case #: [id number]    Re:  Farzana Hughes                                                                                                                     [address] [facility] [address]  : 1955      To whom it may concern,    Farzana is a patient of mine followed in neurology for episodic migraine headaches.  She is having 8-14 days of migraine headaches per month.  She has done a trial of both Depakote and topiramate.  Both medications were tried for at least several months.  Exact dates of Depakote use are unknown, as this was tried prior to my meeting the patient.  She has been on topiramate for several years, but is no longer achieving effectiveness with this.    For these reasons, I believe the patient should qualify for coverage of the Emgality.  Medical necessity exists.    Please let me know if there are any additional questions.    Sincerely,         Nivia Caputo MD

## 2021-06-17 NOTE — TELEPHONE ENCOUNTER
Emgality prior authorization request has been denied by insurance.  Would you like to dictate detailed letter of medical necessity to appeal or is there other advice at this time?  Adelina Darby RN on 6/17/2021 at 10:53 AM

## 2021-06-23 NOTE — TELEPHONE ENCOUNTER
Medication Appeal Initiation    We have initiated an appeal for the requested medication:  Medication: galcanezumab-gnlm (EMGALITY) 120 MG/ML injection -DENIED  Appeal Start Date: 6/23/21     Insurance Company:  [facility]  Comments:     Sent appeal to [facility] appeals dept  Fax:[phone]  Will follow up Friday

## 2021-06-23 NOTE — TELEPHONE ENCOUNTER
Please see Dr. Caputo 6- letter of medical necessity and let me know if there is anything further I need to do at this time for appeal.  Adelina Darby RN on 6/23/2021 at 11:56 AM

## 2021-06-25 NOTE — TELEPHONE ENCOUNTER
Called to check status -   INS rep - Timothy - stated turn around time for standard appeal ( since it does not meet their criteria for urgent) is about 15 calendar days    Will follow up again on July 1st    [employer]# [phone]

## 2021-07-01 NOTE — TELEPHONE ENCOUNTER
[id number]  Approved  6/26/21 - 7/26/21  Ext 12/26/21  [id number]    Called to check status - appeal approved  They are sending fax -   Will update once received    Called pharmacy to have them re-run the claim -   They are still getting a rejected claim    Put a call into INS - waiting to hear back from them

## 2021-07-08 NOTE — TELEPHONE ENCOUNTER
Called to check status  They are still in the process and stated we should have it fixed by tomorrow -   There was an issue with the INS and a rejection that was placed on file  I will call and let the pharmacy know tomorrow

## 2021-07-12 NOTE — TELEPHONE ENCOUNTER
Received approval letter  Notified pharmacy   They will process claim- if they have any issues - I faxed them the approval letter and help desk #  Pharmacy will notify PT when ready

## 2021-08-24 ENCOUNTER — NURSE TRIAGE (OUTPATIENT)
Dept: FAMILY MEDICINE | Facility: CLINIC | Age: 66
End: 2021-08-24

## 2021-08-24 NOTE — TELEPHONE ENCOUNTER
Reason for Disposition    Patient wants to be seen    Additional Information    Negative: Severe difficulty breathing (e.g., struggling for each breath, speaks in single words)    Negative: Bluish (or gray) lips or face    Negative: Wheezing started suddenly after medicine, an allergic food, or bee sting    Negative: Passed out (i.e., fainted, collapsed and was not responding)    Negative: Sounds like a life-threatening emergency to the triager    Negative: SEVERE asthma attack (e.g., very SOB at rest, speaks in single words, loud wheezes)    Negative: SEVERE wheezing or coughing and doesn't have nebulizer or inhaler available    Negative: Peak flow rate less than 50% of baseline level (RED zone)    Negative: Hospitalized before with asthma; now feels same    Negative: Chest pain    Negative: Patient sounds very sick or weak to the triager    Negative: MODERATE asthma attack (e.g., SOB at rest, speaks in phrases, audible wheezes) and not resolved after 2 nebulizer or inhaler treatments given 20 minutes apart    Negative: Peak flow rate 50-80% of baseline level (YELLOW zone) after using 2 nebulizer or inhaler treatments given 20 minutes) apart    Negative: Fever present > 3 days (72 hours)    Negative: Asthma medicine (nebulizer or inhaler) is needed more frequently than every 4 hours to keep you comfortable    Negative: Continuous (nonstop) coughing that keeps patient from working or sleeping, and not improved after inhaler or nebulizer    Negative: Fever > 101 F (38.3 C) and over 60 years of age    Negative: Fever > 103 F (39.4 C)    Protocols used: ASTHMA ATTACK-A-OH

## 2021-08-24 NOTE — TELEPHONE ENCOUNTER
Spoke with pt. States the last couple of days when laying down flat is coughing. Not sure if it's her sinuses, allergies or asthma. Hears a little wheezing, but only if lying in a certain position. No difficulty breathing. No runny nose or congestion. Cough is non productive. No fever or chills. No known exposure to covid 19 or ill contacts.  Is leaving Friday to see friends in [place] for her birthday. Pt wanted to be seen prior to make sure everything is ok. Pt is not sure if she has refills on her albuterol inhaler. She will check and let us know if she needs an urgent refill.

## 2021-08-25 ENCOUNTER — VIRTUAL VISIT (OUTPATIENT)
Dept: FAMILY MEDICINE | Facility: CLINIC | Age: 66
End: 2021-08-25
Payer: COMMERCIAL

## 2021-08-25 ENCOUNTER — LAB (OUTPATIENT)
Dept: URGENT CARE | Facility: URGENT CARE | Age: 66
End: 2021-08-25
Attending: NURSE PRACTITIONER
Payer: COMMERCIAL

## 2021-08-25 DIAGNOSIS — J06.9 VIRAL UPPER RESPIRATORY TRACT INFECTION: Primary | ICD-10-CM

## 2021-08-25 DIAGNOSIS — J06.9 VIRAL UPPER RESPIRATORY TRACT INFECTION: ICD-10-CM

## 2021-08-25 PROCEDURE — U0005 INFEC AGEN DETEC AMPLI PROBE: HCPCS

## 2021-08-25 PROCEDURE — 99213 OFFICE O/P EST LOW 20 MIN: CPT | Mod: GT | Performed by: NURSE PRACTITIONER

## 2021-08-25 PROCEDURE — U0003 INFECTIOUS AGENT DETECTION BY NUCLEIC ACID (DNA OR RNA); SEVERE ACUTE RESPIRATORY SYNDROME CORONAVIRUS 2 (SARS-COV-2) (CORONAVIRUS DISEASE [COVID-19]), AMPLIFIED PROBE TECHNIQUE, MAKING USE OF HIGH THROUGHPUT TECHNOLOGIES AS DESCRIBED BY CMS-2020-01-R: HCPCS

## 2021-08-25 RX ORDER — PREDNISONE 20 MG/1
20 TABLET ORAL DAILY
Qty: 5 TABLET | Refills: 0 | Status: SHIPPED | OUTPATIENT
Start: 2021-08-25 | End: 2021-08-30

## 2021-08-25 NOTE — PROGRESS NOTES
Farzana is a 65 year old who is being evaluated via a billable video visit.      How would you like to obtain your AVS? Mail a copy  If the video visit is dropped, the invitation should be resent by: Text to cell phone: [phone]  Will anyone else be joining your video visit? No      Video Start Time: 11:25 AM    Assessment & Plan     Viral upper respiratory tract infection  Rule out Covid-19.  - Symptomatic COVID-19 Virus (Coronavirus) by PCR; Future  - predniSONE (DELTASONE) 20 MG tablet; Take 1 tablet (20 mg) by mouth daily for 5 days    Ordering of each unique test  Prescription drug management         BMI:   Estimated body mass index is 36.13 kg/m  as calculated from the following:    Height as of 6/15/21: 1.524 m (5').    Weight as of 6/15/21: 83.9 kg (185 lb).           No follow-ups on file.    DREW Mccabe CNP  [facility]   Farzaan is a 65 year old who presents for the following health issues     HPI     Acute Illness  Acute illness concerns: cough when lying down   Onset/Duration: 2 days  Symptoms:  Fever: no  Chills/Sweats: no  Headache (location?): YES  Sinus Pressure: YES  Conjunctivitis:  no  Ear Pain: yes  Rhinorrhea: no  Congestion: YES  Sore Throat: no  Cough: YES  Wheeze: YES  Decreased Appetite: no  Nausea: no  Vomiting: no  Diarrhea: no  Dysuria/Freq.: no  Dysuria or Hematuria: no  Fatigue/Achiness: no  Sick/Strep Exposure: no  Therapies tried and outcome: Mucinex, flonase, albuterol      Review of Systems   Constitutional, HEENT, cardiovascular, pulmonary, gi and gu systems are negative, except as otherwise noted.      Objective           Vitals:  No vitals were obtained today due to virtual visit.    Physical Exam   GENERAL: Healthy, alert and no distress  EYES: Eyes grossly normal to inspection.  No discharge or erythema, or obvious scleral/conjunctival abnormalities.  RESP: No audible wheeze, cough, or visible cyanosis.  No visible retractions  or increased work of breathing.    SKIN: Visible skin clear. No significant rash, abnormal pigmentation or lesions.  NEURO: Cranial nerves grossly intact.  Mentation and speech appropriate for age.  PSYCH: Mentation appears normal, affect normal/bright, judgement and insight intact, normal speech and appearance well-groomed.                Video-Visit Details    Type of service:  Video Visit    Video End Time:11:33 AM    Originating Location (pt. Location): Home    Distant Location (provider location):  [facility]     Platform used for Video Visit: Bridgette

## 2021-08-26 LAB — SARS-COV-2 RNA RESP QL NAA+PROBE: NEGATIVE

## 2021-08-26 ASSESSMENT — ASTHMA QUESTIONNAIRES: ACT_TOTALSCORE: 18

## 2021-08-26 NOTE — RESULT ENCOUNTER NOTE
Dear Farzana,    Your recent test results are attached.      No Covid-19    If you have any questions please feel free to contact [phone] or myself via [employer].    Sincerely,  Tequila Collado, CNP

## 2021-09-12 ENCOUNTER — HEALTH MAINTENANCE LETTER (OUTPATIENT)
Age: 66
End: 2021-09-12

## 2021-09-22 ENCOUNTER — TELEPHONE (OUTPATIENT)
Dept: NEUROLOGY | Facility: CLINIC | Age: 66
End: 2021-09-22

## 2021-09-22 ENCOUNTER — OFFICE VISIT (OUTPATIENT)
Dept: NEUROLOGY | Facility: CLINIC | Age: 66
End: 2021-09-22
Payer: COMMERCIAL

## 2021-09-22 VITALS
WEIGHT: 193.4 LBS | BODY MASS INDEX: 37.97 KG/M2 | DIASTOLIC BLOOD PRESSURE: 73 MMHG | SYSTOLIC BLOOD PRESSURE: 140 MMHG | HEIGHT: 60 IN | HEART RATE: 69 BPM

## 2021-09-22 DIAGNOSIS — G43.009 MIGRAINE WITHOUT AURA AND WITHOUT STATUS MIGRAINOSUS, NOT INTRACTABLE: Primary | ICD-10-CM

## 2021-09-22 PROCEDURE — 99214 OFFICE O/P EST MOD 30 MIN: CPT | Performed by: PSYCHIATRY & NEUROLOGY

## 2021-09-22 RX ORDER — ERENUMAB-AOOE 70 MG/ML
70 INJECTION SUBCUTANEOUS
Qty: 1 ML | Refills: 3 | Status: SHIPPED | OUTPATIENT
Start: 2021-09-22

## 2021-09-22 RX ORDER — TOPIRAMATE 50 MG/1
50 TABLET, FILM COATED ORAL 2 TIMES DAILY
Qty: 60 TABLET | Refills: 2 | Status: SHIPPED | OUTPATIENT
Start: 2021-09-22 | End: 2022-01-04

## 2021-09-22 RX ORDER — RIZATRIPTAN BENZOATE 10 MG/1
10 TABLET ORAL
Qty: 9 TABLET | Refills: 3 | Status: SHIPPED | OUTPATIENT
Start: 2021-09-22

## 2021-09-22 ASSESSMENT — MIFFLIN-ST. JEOR: SCORE: 1348.76

## 2021-09-22 NOTE — PATIENT INSTRUCTIONS
Plan:  -- Continue the Topamax: 50mg twice daily.  -- Continue the as-needed rizatriptan.  -- Let's see if your insurance will cover Aimovig monthly injections.  -- Return to Neurology clinic in 3-4 months.

## 2021-09-22 NOTE — LETTER
[facility] [address] [address]  [phone]        September 27, 2021      Farzana Hughes                                                                                                                     [address] [facility] [address]        To Whom it May Concern,    Farzana is followed in my neurology clinic for mixed headaches, including migraine headaches.  I would like to appeal the decision with regards to authorization for her Aimovig.  I believe that she qualifies for use of this medication given that she has at least 8-10 of her migraine headaches per month and well over 15 days of headaches per month in general.    Please let us know if you have any additional questions.      Thank you for your consideration,         Nivia Caputo MD

## 2021-09-22 NOTE — TELEPHONE ENCOUNTER
Central Prior Authorization Team   Phone: [phone]      PA Initiation    Medication: erenumab-aooe (AIMOVIG) 70 MG/ML injection  Insurance Company: [employer] Part D - Phone [phone] Fax [phone]  Pharmacy Filling the Rx: [facility] [address] [place] [address]  Filling Pharmacy Phone: [phone]  Filling Pharmacy Fax:    Start Date: 9/22/2021

## 2021-09-22 NOTE — NURSING NOTE
Chief Complaint   Patient presents with     Headache     Insurance has not approved Emgality. Taking Topiramate.     Adelina Darby RN on 9/22/2021 at 12:20 PM

## 2021-09-22 NOTE — PROGRESS NOTES
ESTABLISHED PATIENT NEUROLOGY NOTE    DATE OF VISIT: 9/22/2021  MRN: [id number]  PATIENT NAME: Farzana Hughes  YOB: 1955    Chief Complaint   Patient presents with     Headache     Insurance has not approved Emgality. Taking Topiramate.     SUBJECTIVE:                                                      HISTORY OF PRESENT ILLNESS:  Farzana is here for follow up regarding migraine headaches.    History as previously documented by me (6.15.21):  Farzana has a long history of migraines and has done well for several years on long-acting topiramate.  She takes Maxalt as needed.  She was previously on Depakote and Imitrex for her migraines.  The headache started to become more frequent again after a couple of years on Depakote, which is why the Topamax was started in 2012.  She has additional history of restless leg syndrome for which she takes gabapentin.  She was previously followed at [facility] for this.  Brain MRI from 2012 was unremarkable.  No particular headache triggers.  She has additional history of sleep apnea, but this is reportedly mild so that she does not need CPAP.  I spoke with the patient a couple of months ago, she said that she has her typical migraine headaches about once every 3-4 months.  She reported that Maxalt was still helpful to make the headaches at least less severe.  She also was taking Advil as needed for headaches.  If she does not take anything to abort the headache, she told me the headaches can last for several days. There is history of possible stroke in 2001.      There is family history of brain aneurysm in the patient's sister, as well as stroke.  There is history of Alzheimer's in the patient's mother.  Parkinson's in the patient's sister.     Her insurance is no longer covering the long-acting topiramate, so we have her on 50mg of the short acting formulation twice daily.     I spoke with Farzana over the phone.  She asked if this appointment could be switched to a  virtual rather than our planned in-clinic visit.  She says her  is going through a bout of depression, so she has been more stressed and does not want to leave him alone. He had to have a foot removed, and the stump is not healing.  Because it is not healing, she says he cannot get his prosthetic yet.  This is causing him to be depressed.       She has been having more frequent headaches, but she is able to manage these for the most part. She has taken a couple of doses of her rizatriptan over the past few weeks. This helps as long as she rests after taking it.  Typically she will sleep for a while and then wake without the headache.  She says that there is some construction going on outside of her house and it has been very noisy.  This is also leading to increase in her stress and headaches.  She says she can sometimes sit on the bed and take some breaths to calm herself.      He asks about the new migraine medications that she has seen commercials for.  She says she has heard bad things about Botox. She recalls having trouble when she was trying to start the Topamax and taper off of the Depakote.   She tells me that her MyChart access is hit or miss.       We did not discuss her restless leg symptoms today.     Please see my previous consultation note dated 4.15.21 for additional historical information.    I recommended we try a CGRP inhibitor and ordered Emgality.  Unfortunately, her insurance has not approved this for her.  We were hoping to taper her off of the topiramate, as this was no longer providing as much headache prevention.     Farzana is in clinic alone today.  She says that the Topamax does seem to be making the headaches at least tolerable right now. She got it from the [facility] and they are covering the cost because the current prescription is under her 's name and this is where he receives medical care.  She is almost out of the Topamax now.     She is not sure how many headaches she is  having these days.    She says that it has been a while since she had a severe migraine (a couple of years). These typically involve visual changes, dizziness and need to rest. She still has almost daily headaches, but less severe. Can be Right-sided. She says Advil helps some, but for some headaches she has to take the rizatriptan. The rizatriptan does seem to take the level of headache down, so that she can function. A few times she has taken two.     She admits that she has been under increased stress. Her  wants to move to [place] for health reasons. They are weighing the benefits of this. She is worried about her husbands's medical care down there so after much deliberation they have decided not to move.    Her pharmacist said that she may be able to get coverage for Emgality if she schedules clinic appointments for the first couple of shots.  I have not heard of this as a strategy for insurance coverage for the medication.  She is open to trying a different similar medication.    No history of neck or back surgeries.    CURRENT MEDICATIONS:   albuterol (PROAIR HFA) 108 (90 Base) MCG/ACT inhaler, Inhale 2 puffs into the lungs every 4 hours as needed  atorvastatin (LIPITOR) 40 MG tablet, Take 1 tablet (40 mg) by mouth daily Generic please  Continuous Blood Gluc Sensor (FREESTYLE LESLIE 14 DAY SENSOR) [facility], Change every 14 days.  Continuous Blood Gluc Sensor (FREESTYLE LESLIE 2 SENSOR) [facility], 1 each every 14 days 1 each every 14 days. Change every 14 days.  cyclobenzaprine (FLEXERIL) 5 MG tablet, Take 1 tablet (5 mg) by mouth 3 times daily as needed for muscle spasms  doxepin (SINEQUAN) 10 MG capsule, Take 1 capsule (10 mg) by mouth At Bedtime  EPINEPHrine (EPIPEN 2-SALBADOR) 0.3 MG/0.3ML injection 2-pack, Inject 0.3 mLs (0.3 mg) into the muscle as needed for anaphylaxis  fluticasone (FLONASE) 50 MCG/ACT nasal spray, Spray 1 spray into both nostrils daily as needed for rhinitis or allergies  gabapentin (NEURONTIN)  100 MG capsule, Take 200 mg by mouth daily 100- 200mg in the Afternoon. Also takes 300 mg at bedtime  gabapentin (NEURONTIN) 300 MG capsule, Take 300 mg by mouth At Bedtime Also takes 200 mg in afternoon.  GARLIC 1000 MG OR CAPS, 1 tablet twice daily  lisinopril (ZESTRIL) 20 MG tablet, TAKE HALF TABLET BY MOUTH DAILY  metFORMIN (GLUCOPHAGE-XR) 500 MG 24 hr tablet, Take 1 tablet (500 mg) by mouth daily (with dinner)  montelukast (SINGULAIR) 10 MG tablet, TAKE ONE TABLET BY MOUTH AT BEDTIME  omeprazole (PRILOSEC) 40 MG DR capsule, TAKE 1 CAPSULE BY MOUTH DAILY. TAKE 30 TO 60 MINUTES BEFORE A MEAL  order for DME, Equipment being ordered: 4 wheeled walker replacement  Probiotic Product (ALIGN PO), Once daily  rizatriptan (MAXALT) 10 MG tablet, Take 1 tablet (10 mg) by mouth at onset of headache for migraine May repeat in 2 hours. Max 3 tablets/24 hours.  topiramate (TOPAMAX) 25 MG tablet, Take 50 mg by mouth 2 times daily  blood glucose (FREESTYLE TEST STRIPS) test strip, Use to test blood sugar 1 times daily or as directed. To be used with Freestyle Bull CGM meter. (Patient not taking: Reported on 9/22/2021)  ipratropium (ATROVENT) 0.06 % nasal spray, Spray 1-2 sprays into both nostrils 2 times daily (Patient not taking: Reported on 8/25/2021)  olopatadine (PATADAY) 0.2 % ophthalmic solution, Place 1 drop into both eyes daily (Patient not taking: Reported on 8/25/2021)  order for DME, Equipment being ordered: Silicone heel cup (Patient not taking: Reported on 6/15/2021)  order for DME, Hinged knee brace - medium (Patient not taking: Reported on 6/15/2021)  order for DME, Equipment being ordered: Hinged knee brace (Patient not taking: Reported on 6/15/2021)  scopolamine (TRANSDERM-SCOP) 1.5 MG patch 72 hr, Place onto the skin every 72 hours (Patient not taking: Reported on 8/25/2021)  topiramate ER (QUDEXY XR) 100 MG 24 hr capsule, Take 1 capsule (100 mg) by mouth daily (Patient not taking: Reported on 4/23/2021)    No  current facility-administered medications on file prior to visit.      RECENT DIAGNOSTIC STUDIES:   Labs: No results found for any visits on 09/22/21.    REVIEW OF SYSTEMS:                                                      10-point review of systems is negative except as mentioned above in HPI.    EXAM:                                                      Physical Exam:   Vitals: BP (!) 140/73 (BP Location: Right arm)   Pulse 69   Ht 1.524 m (5')   Wt 87.7 kg (193 lb 6.4 oz)   BMI 37.77 kg/m    BMI= Body mass index is 37.77 kg/m .  GENERAL: NAD.  HEENT: NC/AT.  Few teeth.  CV: RRR. S1, S2.   NECK: No bruits.  PULM: Non-labored breathing.   Neurologic:  MENTAL STATUS: Alert, attentive. Speech is fluent. Normal comprehension.  Normal concentration. Adequate fund of knowledge.   CRANIAL NERVES: Discs flat. Visual fields intact to confrontation. Pupils equally, round and reactive to light. Facial sensation and movement normal. EOM full. Hearing intact to conversation. Trapezius strength intact. Palate moves symmetrically. Tongue midline.  MOTOR: 5/5 in proximal and distal muscle groups of upper and lower extremities. Tone and bulk normal.   DTRs: Intact and symmetric in biceps, BR, triceps and patellae. Ankle jerks present. Babinski down-going bilaterally.   SENSATION: Normal light touch and pinprick. Intact proprioception at great toes. Vibration: Slightly decreased at both ankles.   COORDINATION: Normal finger nose finger. Finger tapping normal. Knee heel shin normal.  STATION AND GAIT: Romberg Negative. Good postural reflexes. Casual gait is antalgic, otherwise normal.    ASSESSMENT and PLAN:                                                      Assessment:    ICD-10-CM    1. Migraine without aura and without status migrainosus, not intractable  G43.009 erenumab-aooe (AIMOVIG) 70 MG/ML injection     topiramate (TOPAMAX) 50 MG tablet        Ms. Hughes is a pleasant 66-year-old woman followed in Neurology for  migraine headache management.  She was previously on long-acting topiramate which worked very well for headache prevention.  Unfortunately her insurance no longer covers this so she has been taking the short acting formulation instead.  She describes reasonable control of her headaches right now at least in terms of intensity.  She would appreciate having fewer headaches if possible, because she has at least some form of head pain almost every day.  We tried starting Emgality but this was not covered by her insurance.  We could try another CGRP inhibitor in case they would potentially provide coverage for that.  In the meantime we will continue the Topamax and the rizatriptan, as needed.  She still finds the rizatriptan effective for cutting down the intensity of her headaches.  I would like to see Farzana back in clinic again in a few months.  She expressed understanding and agreement with the plan.    Plan:  -- Continue the Topamax: 50mg twice daily.  -- Continue the as-needed rizatriptan.  -- Let's see if your insurance will cover Aimovig monthly injections.  -- Return to Neurology clinic in 3-4 months.     Total Time: 30 minutes were spent with the patient and in chart review/documentation (as described above in Assessment and Plan)/coordinating the care on date of service.    Nivia Caputo MD  Neurology    Dragon software used in the dictation of this note.

## 2021-09-22 NOTE — LETTER
9/22/2021         RE: Farzana Hughes  [address] [facility] [place] [address]        Dear Colleague,    Thank you for referring your patient, Farzana Hughes, to the [facility] [place]. Please see a copy of my visit note below.    ESTABLISHED PATIENT NEUROLOGY NOTE    DATE OF VISIT: 9/22/2021  MRN: [id number]  PATIENT NAME: Farzana Hughes  YOB: 1955    Chief Complaint   Patient presents with     Headache     Insurance has not approved Emgality. Taking Topiramate.     SUBJECTIVE:                                                      HISTORY OF PRESENT ILLNESS:  Farzana is here for follow up regarding migraine headaches.    History as previously documented by me (6.15.21):  Farzana has a long history of migraines and has done well for several years on long-acting topiramate.  She takes Maxalt as needed.  She was previously on Depakote and Imitrex for her migraines.  The headache started to become more frequent again after a couple of years on Depakote, which is why the Topamax was started in 2012.  She has additional history of restless leg syndrome for which she takes gabapentin.  She was previously followed at [facility] for this.  Brain MRI from 2012 was unremarkable.  No particular headache triggers.  She has additional history of sleep apnea, but this is reportedly mild so that she does not need CPAP.  I spoke with the patient a couple of months ago, she said that she has her typical migraine headaches about once every 3-4 months.  She reported that Maxalt was still helpful to make the headaches at least less severe.  She also was taking Advil as needed for headaches.  If she does not take anything to abort the headache, she told me the headaches can last for several days. There is history of possible stroke in 2001.      There is family history of brain aneurysm in the patient's sister, as well as stroke.  There is history of Alzheimer's in the patient's mother.   Parkinson's in the patient's sister.     Her insurance is no longer covering the long-acting topiramate, so we have her on 50mg of the short acting formulation twice daily.     I spoke with Farzana over the phone.  She asked if this appointment could be switched to a virtual rather than our planned in-clinic visit.  She says her  is going through a bout of depression, so she has been more stressed and does not want to leave him alone. He had to have a foot removed, and the stump is not healing.  Because it is not healing, she says he cannot get his prosthetic yet.  This is causing him to be depressed.       She has been having more frequent headaches, but she is able to manage these for the most part. She has taken a couple of doses of her rizatriptan over the past few weeks. This helps as long as she rests after taking it.  Typically she will sleep for a while and then wake without the headache.  She says that there is some construction going on outside of her house and it has been very noisy.  This is also leading to increase in her stress and headaches.  She says she can sometimes sit on the bed and take some breaths to calm herself.      He asks about the new migraine medications that she has seen commercials for.  She says she has heard bad things about Botox. She recalls having trouble when she was trying to start the Topamax and taper off of the Depakote.   She tells me that her MyChart access is hit or miss.       We did not discuss her restless leg symptoms today.     Please see my previous consultation note dated 4.15.21 for additional historical information.    I recommended we try a CGRP inhibitor and ordered Emgality.  Unfortunately, her insurance has not approved this for her.  We were hoping to taper her off of the topiramate, as this was no longer providing as much headache prevention.     Farzana is in clinic alone today.  She says that the Topamax does seem to be making the headaches at least  tolerable right now. She got it from the [facility] and they are covering the cost because the current prescription is under her 's name and this is where he receives medical care.  She is almost out of the Topamax now.     She is not sure how many headaches she is having these days.    She says that it has been a while since she had a severe migraine (a couple of years). These typically involve visual changes, dizziness and need to rest. She still has almost daily headaches, but less severe. Can be Right-sided. She says Advil helps some, but for some headaches she has to take the rizatriptan. The rizatriptan does seem to take the level of headache down, so that she can function. A few times she has taken two.     She admits that she has been under increased stress. Her  wants to move to [place] for health reasons. They are weighing the benefits of this. She is worried about her husbands's medical care down there so after much deliberation they have decided not to move.    Her pharmacist said that she may be able to get coverage for Emgality if she schedules clinic appointments for the first couple of shots.  I have not heard of this as a strategy for insurance coverage for the medication.  She is open to trying a different similar medication.    No history of neck or back surgeries.    CURRENT MEDICATIONS:   albuterol (PROAIR HFA) 108 (90 Base) MCG/ACT inhaler, Inhale 2 puffs into the lungs every 4 hours as needed  atorvastatin (LIPITOR) 40 MG tablet, Take 1 tablet (40 mg) by mouth daily Generic please  Continuous Blood Gluc Sensor (FREESTYLE LESLIE 14 DAY SENSOR) [facility], Change every 14 days.  Continuous Blood Gluc Sensor (FREESTYLE LESLIE 2 SENSOR) [facility], 1 each every 14 days 1 each every 14 days. Change every 14 days.  cyclobenzaprine (FLEXERIL) 5 MG tablet, Take 1 tablet (5 mg) by mouth 3 times daily as needed for muscle spasms  doxepin (SINEQUAN) 10 MG capsule, Take 1 capsule (10 mg) by mouth At  Bedtime  EPINEPHrine (EPIPEN 2-SALBADOR) 0.3 MG/0.3ML injection 2-pack, Inject 0.3 mLs (0.3 mg) into the muscle as needed for anaphylaxis  fluticasone (FLONASE) 50 MCG/ACT nasal spray, Spray 1 spray into both nostrils daily as needed for rhinitis or allergies  gabapentin (NEURONTIN) 100 MG capsule, Take 200 mg by mouth daily 100- 200mg in the Afternoon. Also takes 300 mg at bedtime  gabapentin (NEURONTIN) 300 MG capsule, Take 300 mg by mouth At Bedtime Also takes 200 mg in afternoon.  GARLIC 1000 MG OR CAPS, 1 tablet twice daily  lisinopril (ZESTRIL) 20 MG tablet, TAKE HALF TABLET BY MOUTH DAILY  metFORMIN (GLUCOPHAGE-XR) 500 MG 24 hr tablet, Take 1 tablet (500 mg) by mouth daily (with dinner)  montelukast (SINGULAIR) 10 MG tablet, TAKE ONE TABLET BY MOUTH AT BEDTIME  omeprazole (PRILOSEC) 40 MG DR capsule, TAKE 1 CAPSULE BY MOUTH DAILY. TAKE 30 TO 60 MINUTES BEFORE A MEAL  order for DME, Equipment being ordered: 4 wheeled walker replacement  Probiotic Product (ALIGN PO), Once daily  rizatriptan (MAXALT) 10 MG tablet, Take 1 tablet (10 mg) by mouth at onset of headache for migraine May repeat in 2 hours. Max 3 tablets/24 hours.  topiramate (TOPAMAX) 25 MG tablet, Take 50 mg by mouth 2 times daily  blood glucose (FREESTYLE TEST STRIPS) test strip, Use to test blood sugar 1 times daily or as directed. To be used with Freestyle Bull CGM meter. (Patient not taking: Reported on 9/22/2021)  ipratropium (ATROVENT) 0.06 % nasal spray, Spray 1-2 sprays into both nostrils 2 times daily (Patient not taking: Reported on 8/25/2021)  olopatadine (PATADAY) 0.2 % ophthalmic solution, Place 1 drop into both eyes daily (Patient not taking: Reported on 8/25/2021)  order for DME, Equipment being ordered: Silicone heel cup (Patient not taking: Reported on 6/15/2021)  order for DME, Hinged knee brace - medium (Patient not taking: Reported on 6/15/2021)  order for DME, Equipment being ordered: Hinged knee brace (Patient not taking: Reported  on 6/15/2021)  scopolamine (TRANSDERM-SCOP) 1.5 MG patch 72 hr, Place onto the skin every 72 hours (Patient not taking: Reported on 8/25/2021)  topiramate ER (QUDEXY XR) 100 MG 24 hr capsule, Take 1 capsule (100 mg) by mouth daily (Patient not taking: Reported on 4/23/2021)    No current facility-administered medications on file prior to visit.      RECENT DIAGNOSTIC STUDIES:   Labs: No results found for any visits on 09/22/21.    REVIEW OF SYSTEMS:                                                      10-point review of systems is negative except as mentioned above in HPI.    EXAM:                                                      Physical Exam:   Vitals: BP (!) 140/73 (BP Location: Right arm)   Pulse 69   Ht 1.524 m (5')   Wt 87.7 kg (193 lb 6.4 oz)   BMI 37.77 kg/m    BMI= Body mass index is 37.77 kg/m .  GENERAL: NAD.  HEENT: NC/AT.  Few teeth.  CV: RRR. S1, S2.   NECK: No bruits.  PULM: Non-labored breathing.   Neurologic:  MENTAL STATUS: Alert, attentive. Speech is fluent. Normal comprehension.  Normal concentration. Adequate fund of knowledge.   CRANIAL NERVES: Discs flat. Visual fields intact to confrontation. Pupils equally, round and reactive to light. Facial sensation and movement normal. EOM full. Hearing intact to conversation. Trapezius strength intact. Palate moves symmetrically. Tongue midline.  MOTOR: 5/5 in proximal and distal muscle groups of upper and lower extremities. Tone and bulk normal.   DTRs: Intact and symmetric in biceps, BR, triceps and patellae. Ankle jerks present. Babinski down-going bilaterally.   SENSATION: Normal light touch and pinprick. Intact proprioception at great toes. Vibration: Slightly decreased at both ankles.   COORDINATION: Normal finger nose finger. Finger tapping normal. Knee heel shin normal.  STATION AND GAIT: Romberg Negative. Good postural reflexes. Casual gait is antalgic, otherwise normal.    ASSESSMENT and PLAN:                                                       Assessment:    ICD-10-CM    1. Migraine without aura and without status migrainosus, not intractable  G43.009 erenumab-aooe (AIMOVIG) 70 MG/ML injection     topiramate (TOPAMAX) 50 MG tablet        Ms. Hughes is a pleasant 66-year-old woman followed in Neurology for migraine headache management.  She was previously on long-acting topiramate which worked very well for headache prevention.  Unfortunately her insurance no longer covers this so she has been taking the short acting formulation instead.  She describes reasonable control of her headaches right now at least in terms of intensity.  She would appreciate having fewer headaches if possible, because she has at least some form of head pain almost every day.  We tried starting Emgality but this was not covered by her insurance.  We could try another CGRP inhibitor in case they would potentially provide coverage for that.  In the meantime we will continue the Topamax and the rizatriptan, as needed.  She still finds the rizatriptan effective for cutting down the intensity of her headaches.  I would like to see Farzana back in clinic again in a few months.  She expressed understanding and agreement with the plan.    Plan:  -- Continue the Topamax: 50mg twice daily.  -- Continue the as-needed rizatriptan.  -- Let's see if your insurance will cover Aimovig monthly injections.  -- Return to Neurology clinic in 3-4 months.     Total Time: 30 minutes were spent with the patient and in chart review/documentation (as described above in Assessment and Plan)/coordinating the care on date of service.    Nivia Caputo MD  Neurology    Dragon software used in the dictation of this note.                      Again, thank you for allowing me to participate in the care of your patient.        Sincerely,        Nivia Caputo MD

## 2021-09-23 NOTE — TELEPHONE ENCOUNTER
PRIOR AUTHORIZATION DENIED    Medication: erenumab-aooe (AIMOVIG) 70 MG/ML injection-DENIED    Denial Date: 9/23/2021    Denial Rational:           Appeal Information:

## 2021-09-24 NOTE — TELEPHONE ENCOUNTER
In reading my last clinic visit note, I believe Farzana is having more than 15 headaches per month, but I want to clarify that she is having at least 8 of her migraine-type headaches (even if not severe), per month.  It looks like these 2 criteria are required for authorization of the Aimovig. Please let me know.     Thank you!  MALIKA

## 2021-09-24 NOTE — TELEPHONE ENCOUNTER
Isra who was with her  informed that her insurance has denied the prior authorization request for Aimovig and that Dr. Caputo would like to know how many migraines isra is having per month.  Isra and Henry report that she has 10 to 12 migraines per month.  Isra informed that I will give this information to Dr. Caputo.  Adelina Darby RN on 9/24/2021 at 3:26 PM

## 2021-09-27 NOTE — TELEPHONE ENCOUNTER
Medication Appeal Initiation    We have initiated an appeal for the requested medication:  Medication: erenumab-aooe (AIMOVIG) 70 MG/ML injection-APPEAL Pending  Appeal Start Date:  9/27/2021  Insurance Company: Other (see comments)  Comments:  Appeal and denial letter faxed

## 2021-10-06 DIAGNOSIS — E11.649 UNCONTROLLED TYPE 2 DIABETES MELLITUS WITH HYPOGLYCEMIA WITHOUT COMA (H): ICD-10-CM

## 2021-10-06 NOTE — TELEPHONE ENCOUNTER
MEDICATION APPEAL APPROVED    Medication: erenumab-aooe (AIMOVIG) 70 MG/ML injection-APPEAL APPROVED  Authorization Effective Date: 9/30/2021  Authorization Expiration Date: 3/29/2022  Approved Dose/Quantity:   Reference #:     Insurance Company:   Expected CoPay:  $652.04  CoPay Card Available:      Foundation Assistance Needed:    Which Pharmacy is filling the prescription (Not needed for infusion/clinic administered): [facility] [address] [place] [address]    Pharmacy processed medication but there is a high co-pay of $652.04.

## 2021-10-07 NOTE — TELEPHONE ENCOUNTER
Routing refill request to provider for review/approval because:  Drug not on the [facility] refill protocol       Requested Prescriptions   Pending Prescriptions Disp Refills     Continuous Blood Gluc Sensor (FREESTYLE LESLIE 14 DAY SENSOR) MISC [Pharmacy Med Name: FreeStyle Leslie 14 Day Sensor Miscellaneous] 2 each 0     Sig: use with leslie reader. change every 14 days       There is no refill protocol information for this order        Laura Gonzales RN on 10/7/2021 at 10:51 AM

## 2021-10-08 RX ORDER — FLASH GLUCOSE SENSOR
KIT MISCELLANEOUS
Qty: 2 EACH | Refills: 0 | Status: SHIPPED | OUTPATIENT
Start: 2021-10-08 | End: 2021-12-24

## 2021-10-08 NOTE — TELEPHONE ENCOUNTER
Dr. Caputo-  Per PA team regarding Aimovig:    Appeal has been approved and pharmacy has been notified to reprocess medication, but  there is a high co-pay of $652.04. Please discuss with patient if needed. Please close encounter when finished.     Thanks,   Central Prior Auth Team        Any other options for pt?

## 2021-10-14 DIAGNOSIS — E11.649 UNCONTROLLED TYPE 2 DIABETES MELLITUS WITH HYPOGLYCEMIA WITHOUT COMA (H): ICD-10-CM

## 2021-10-15 NOTE — TELEPHONE ENCOUNTER
Routing refill request to provider for review/approval because:  Drug not on the [facility] refill protocol       Requested Prescriptions   Pending Prescriptions Disp Refills     Continuous Blood Gluc Sensor (FREESTYLE LESLIE 14 DAY SENSOR) MISC [Pharmacy Med Name: FreeStyle Leslie 14 Day Sensor Miscellaneous] 2 each 0     Sig: use with leslie reader. change every 14 days       There is no refill protocol information for this order        Laura Gonzales RN on 10/15/2021 at 11:12 AM

## 2021-10-18 RX ORDER — FLASH GLUCOSE SENSOR
KIT MISCELLANEOUS
Qty: 2 EACH | Refills: 0 | OUTPATIENT
Start: 2021-10-18

## 2021-10-21 NOTE — PROGRESS NOTES
Subjective     Farzana Hughes is a 64 year old female who presents to clinic today for the following health issues:    HPI   Chief Complaint   Patient presents with     RECHECK     Needs colonoscopy due to positive cologuard     Health Maintenance     PPSV23       Positive cologuard - needs colonoscopy set up  Needs eye exam  Farzana presents for diabetes follow-up visit.  Currently taking diet controlled,  Blood glucose not being checked.  Patient has been getting regular exercise and is monitoring processed carb intake.     BP Readings from Last 3 Encounters:   10/14/19 118/72   08/22/19 103/71   07/30/19 126/78    Wt Readings from Last 3 Encounters:   10/14/19 89.6 kg (197 lb 8 oz)   08/22/19 86.2 kg (190 lb)   07/30/19 86.4 kg (190 lb 8 oz)        Reviewed and updated as needed this visit by Provider  Tobacco  Allergies  Meds  Problems  Med Hx  Surg Hx  Fam Hx         Review of Systems   ROS COMP: Constitutional, HEENT, cardiovascular, pulmonary, gi and gu systems are negative, except as otherwise noted.      Objective    /72   Pulse 82   Temp 97.8  F (36.6  C) (Oral)   Resp 16   Ht 1.524 m (5')   Wt 89.6 kg (197 lb 8 oz)   SpO2 98%   BMI 38.57 kg/m    Body mass index is 38.57 kg/m .  Physical Exam   GENERAL: healthy, alert and no distress  EYES: Eyes grossly normal to inspection, PERRL and conjunctivae and sclerae normal  NECK: no adenopathy, no asymmetry, masses, or scars and thyroid normal to palpation  RESP: lungs clear to auscultation - no rales, rhonchi or wheezes  CV: regular rate and rhythm, normal S1 S2, no S3 or S4, no murmur, click or rub, no peripheral edema and peripheral pulses strong  SKIN: no suspicious lesions or rashes  PSYCH: mentation appears normal, affect normal/bright          Assessment & Plan       ICD-10-CM    1. Type 2 diabetes, diet controlled (H) E11.9 RetinaVue Eye Scan   2. Non-allergic rhinitis J31.0    3. Screen for colon cancer Z12.11 GASTROENTEROLOGY ADULT  REF PROCEDURE ONLY [facility] [phone]; No Preference - GI Provider Only   4. Need for Streptococcus pneumoniae vaccination Z23 Pneumococcal vaccine 23 valent PPSV23  (Pneumovax) [06097]          Follow-up in 3-6 months  Villa Blake MD  [facility]       done

## 2021-10-26 NOTE — TELEPHONE ENCOUNTER
I left message asking Farzana to call back so I can get an update on what she decided regarding Aimovig since there is a high copay.  Adelina Darby RN on 10/26/2021 at 10:26 AM

## 2021-10-26 NOTE — TELEPHONE ENCOUNTER
Farzana returned call and says that she is unable to afford the almost $700 co pay for Aimovig.  Farzana commented that she is unable to take regular Topamax because it makes her dizzy/ unable to function and extended release is not covered by the insurance. Farzana informed I will send message to Dr. Caputo to check if there is any other option for her at this time.  Adelina Darby RN on 10/26/2021 at 12:51 PM

## 2021-10-27 NOTE — TELEPHONE ENCOUNTER
We could try increasing her Topamax dosing to see if this helps.  Otherwise, if Farzana is willing I think it might be helpful to have [facility]  I am happy to refer her if she would like.    Please let me know what the patient prefers.    Thank you,  Dr. Caputo

## 2021-11-02 NOTE — TELEPHONE ENCOUNTER
Manuela called back today and I gave her info from below and right now she said she will need to pass on her medication increase and also the referral to a headache specialist as she is dealing with her husbands health right now and appointments for him at [facility].  She will call back when wanting to proceed with anything

## 2021-11-03 DIAGNOSIS — E11.9 TYPE 2 DIABETES MELLITUS WITHOUT COMPLICATION, WITHOUT LONG-TERM CURRENT USE OF INSULIN (H): ICD-10-CM

## 2021-11-03 RX ORDER — FLASH GLUCOSE SENSOR
KIT MISCELLANEOUS
Qty: 2 EACH | Refills: 0 | Status: SHIPPED | OUTPATIENT
Start: 2021-11-03 | End: 2021-12-24

## 2021-11-03 NOTE — TELEPHONE ENCOUNTER
Prescription approved per [facility] Refill Protocol.    Jimena Ordoñez RN  [facility]

## 2021-11-07 ENCOUNTER — HEALTH MAINTENANCE LETTER (OUTPATIENT)
Age: 66
End: 2021-11-07

## 2021-11-22 ENCOUNTER — TELEPHONE (OUTPATIENT)
Dept: NEUROLOGY | Facility: CLINIC | Age: 66
End: 2021-11-22
Payer: COMMERCIAL

## 2021-11-22 DIAGNOSIS — G43.009 MIGRAINE WITHOUT AURA AND WITHOUT STATUS MIGRAINOSUS, NOT INTRACTABLE: Primary | ICD-10-CM

## 2021-11-22 RX ORDER — GABAPENTIN 300 MG/1
300 CAPSULE ORAL AT BEDTIME
Qty: 90 CAPSULE | Refills: 0 | Status: SHIPPED | OUTPATIENT
Start: 2021-11-22 | End: 2022-01-26

## 2021-11-22 NOTE — TELEPHONE ENCOUNTER
Health Call Center    Phone Message    May a detailed message be left on voicemail: yes     Reason for Call: Medication Refill Request    Has the patient contacted the pharmacy for the refill? Yes   Patient requesting refills of gabapentin 300mg and topiramate  50mg be sent to [facility] off [address]    Patient states she is out. She did call pharmacy and they said they sent the refill request to the wrong doctor. Needing Dr. Lazar to refill please asap.     Action Taken: Message routed to:  [facility] [place] [facility]    Travel Screening: Not Applicable

## 2021-11-22 NOTE — TELEPHONE ENCOUNTER
Spoke with Farzana- she does not need Topamax but needs gabapentin 300mg at bedtime  I see this on her medication list but not prescribed by Dr. Caputo. She states that  Fridaniella (a neurologist outside of [place]) was prescribing this for her but she doesn't go to that clinic anymore. Looks like 300mg capsules were last dispensed at [facility] 10/14/21. She takes this at night to help her RLS and was wondering if you could take over proscribing?  Maryan Maurer, MADELAINE on 11/22/2021 at 2:16 PM

## 2021-11-26 DIAGNOSIS — E11.9 TYPE 2 DIABETES, DIET CONTROLLED (H): ICD-10-CM

## 2021-11-28 DIAGNOSIS — E11.9 TYPE 2 DIABETES MELLITUS WITHOUT COMPLICATION, WITHOUT LONG-TERM CURRENT USE OF INSULIN (H): Primary | ICD-10-CM

## 2021-11-28 RX ORDER — METFORMIN HCL 500 MG
500 TABLET, EXTENDED RELEASE 24 HR ORAL
Qty: 90 TABLET | Refills: 0 | Status: SHIPPED | OUTPATIENT
Start: 2021-11-28 | End: 2022-01-07

## 2021-11-30 NOTE — TELEPHONE ENCOUNTER
Routing refill request to provider for review/approval because:  Drug not on the [facility] refill protocol           Pending Prescriptions:                       Disp   Refills    Continuous Blood Gluc Sensor (FREESTYLE LI*2 each 0        Sig: CHANGE EVERY 14 DAYS.        Hoang Zuñiga RN

## 2021-12-01 ENCOUNTER — VIRTUAL VISIT (OUTPATIENT)
Dept: FAMILY MEDICINE | Facility: CLINIC | Age: 66
End: 2021-12-01
Payer: COMMERCIAL

## 2021-12-01 DIAGNOSIS — E11.9 TYPE 2 DIABETES MELLITUS WITHOUT COMPLICATION, WITHOUT LONG-TERM CURRENT USE OF INSULIN (H): ICD-10-CM

## 2021-12-01 DIAGNOSIS — Z20.822 SUSPECTED 2019 NOVEL CORONAVIRUS INFECTION: Primary | ICD-10-CM

## 2021-12-01 DIAGNOSIS — E66.01 MORBID OBESITY (H): ICD-10-CM

## 2021-12-01 PROCEDURE — 99214 OFFICE O/P EST MOD 30 MIN: CPT | Mod: TEL | Performed by: PREVENTIVE MEDICINE

## 2021-12-01 RX ORDER — BENZONATATE 200 MG/1
200 CAPSULE ORAL 3 TIMES DAILY PRN
Qty: 30 CAPSULE | Refills: 0 | Status: SHIPPED | OUTPATIENT
Start: 2021-12-01

## 2021-12-01 NOTE — PATIENT INSTRUCTIONS
"Discharge Instructions for COVID-19 Patients  You have--or may have--COVID-19. Please follow the instructions listed below.   If you have a weakened immune system, discuss with your doctor any other actions you need to take.  How can I protect others?  If you have symptoms (fever, cough, body aches or trouble breathing):    Stay home and away from others (self-isolate) until:  ? Your other symptoms have resolved (gotten better). And   ? You've had no fever--and no medicine that reduces fever--for 1 full day (24 hours). And   ? At least 10 days have passed since your symptoms started. (You may need to wait 20 days. Follow the advice of your care team.)  If you don't show symptoms, but testing showed that you have COVID-19:    Stay home and away from others (self-isolate) until at least 10 days have passed since the date of your first positive COVID-19 test.  During this time    Stay in your own room, even for meals. Use your own bathroom if you can.    Stay away from others in your home. No hugging, kissing or shaking hands. No visitors.    Don't go to work, school or anywhere else.    Clean \"high touch\" surfaces often (doorknobs, counters, handles). Use household cleaning spray or wipes.    You'll find a full list of  on the EPA website: www.epa.gov/pesticide-registration/list-n-disinfectants-use-against-sars-cov-2.    Cover your mouth and nose with a mask or other face covering to avoid spreading germs.    Wash your hands and face often. Use soap and water.    Caregivers in these groups are at risk for severe illness due to COVID-19:  ? People 65 years and older  ? People who live in a nursing home or long-term care facility  ? People with chronic disease (lung, heart, cancer, diabetes, kidney, liver, immunologic)  ? People who have a weakened immune system, including those who:    Are in cancer treatment    Take medicine that weakens the immune system, such as corticosteroids    Had a bone marrow or organ " transplant    Have an immune deficiency    Have poorly controlled HIV or AIDS    Are obese (body mass index of 40 or higher)    Smoke regularly    Caregivers should wear gloves while washing dishes, handling laundry and cleaning bedrooms and bathrooms.    Use caution when washing and drying laundry: Don't shake dirty laundry and use the warmest water setting that you can.    For more tips on managing your health at home, go to www.cdc.gov/coronavirus/2019-ncov/downloads/10Things.pdf.  How can I take care of myself at home?  1. Get lots of rest. Drink extra fluids (unless a doctor has told you not to).  2. Take Tylenol (acetaminophen) for fever or pain. If you have liver or kidney problems, ask your family doctor if it's okay to take Tylenol.   Adults can take either:   ? 650 mg (two 325 mg pills) every 4 to 6 hours, or   ? 1,000 mg (two 500 mg pills) every 8 hours as needed.  ? Note: Don't take more than 3,000 mg in one day. Acetaminophen is found in many medicines (both prescribed and over-the-counter medicines). Read all labels to be sure you don't take too much.   For children, check the Tylenol bottle for the right dose. The dose is based on the child's age or weight.  3. If you have other health problems (like cancer, heart failure, an organ transplant or severe kidney disease): Call your specialty clinic if you don't feel better in the next 2 days.  4. Know when to call 911. Emergency warning signs include:  ? Trouble breathing or shortness of breath  ? Pain or pressure in the chest that doesn't go away  ? Feeling confused like you haven't felt before, or not being able to wake up  ? Bluish-colored lips or face  5. Your doctor may have prescribed a blood thinner medicine. Follow their instructions.  Where can I get more information?     [employer] [facility] - About COVID-19:   [email or web address]    CDC - What to Do If You're Sick:  www.cdc.gov/coronavirus/2019-ncov/about/steps-when-sick.html    CDC - Ending Home Isolation: www.cdc.gov/coronavirus/2019-ncov/hcp/disposition-in-home-patients.html    CDC - Caring for Someone: www.cdc.gov/coronavirus/2019-ncov/if-you-are-sick/care-for-someone.html    [facility] - Interim Guidance for Hospital Discharge to Home: www.health.[facility].[place]./diseases/coronavirus/hcp/hospdischarge.pdf    Below are the COVID-19 hotlines at the [place] Department of Health ([facility]). Interpreters are available.  ? For health questions: Call 138-734-1780 or 1-993.648.6734 (7 a.m. to 7 p.m.)  ? For questions about schools and childcare: Call 037-101-7037 or 1-478.501.1976 (7 a.m. to 7 p.m.)    For informational purposes only. Not to replace the advice of your health care provider. Clinically reviewed by Dr. Jose Ashraf.   Copyright   2020 [facility]. All rights reserved. [employer] [id number] - REV 01/05/21.

## 2021-12-01 NOTE — PROGRESS NOTES
"Farzana is a 66 year old who is being evaluated via a billable telephone visit.      What phone number would you like to be contacted at? Home number   How would you like to obtain your AVS? MyChart    Assessment & Plan     Suspected 2019 novel coronavirus infection  -symptoms started 11/28/21  - benzonatate (TESSALON) 200 MG capsule  Dispense: 30 capsule; Refill: 0  - Symptomatic COVID-19 Virus (Coronavirus) by PCR Nose  -ER precautions: chest pain, shortness of breath.      Discharge Instructions for COVID-19 Patients  You have--or may have--COVID-19. Please follow the instructions listed below.   If you have a weakened immune system, discuss with your doctor any other actions you need to take.  How can I protect others?  If you have symptoms (fever, cough, body aches or trouble breathing):    Stay home and away from others (self-isolate) until:  ? Your other symptoms have resolved (gotten better). And   ? You've had no fever--and no medicine that reduces fever--for 1 full day (24 hours). And   ? At least 10 days have passed since your symptoms started. (You may need to wait 20 days. Follow the advice of your care team.)  If you don't show symptoms, but testing showed that you have COVID-19:    Stay home and away from others (self-isolate) until at least 10 days have passed since the date of your first positive COVID-19 test.  During this time    Stay in your own room, even for meals. Use your own bathroom if you can.    Stay away from others in your home. No hugging, kissing or shaking hands. No visitors.    Don't go to work, school or anywhere else.    Clean \"high touch\" surfaces often (doorknobs, counters, handles). Use household cleaning spray or wipes.    You'll find a full list of  on the EPA website: www.epa.gov/pesticide-registration/list-n-disinfectants-use-against-sars-cov-2.    Cover your mouth and nose with a mask or other face covering to avoid spreading germs.    Wash your hands and face " often. Use soap and water.    Caregivers in these groups are at risk for severe illness due to COVID-19:  ? People 65 years and older  ? People who live in a nursing home or long-term care facility  ? People with chronic disease (lung, heart, cancer, diabetes, kidney, liver, immunologic)  ? People who have a weakened immune system, including those who:    Are in cancer treatment    Take medicine that weakens the immune system, such as corticosteroids    Had a bone marrow or organ transplant    Have an immune deficiency    Have poorly controlled HIV or AIDS    Are obese (body mass index of 40 or higher)    Smoke regularly    Caregivers should wear gloves while washing dishes, handling laundry and cleaning bedrooms and bathrooms.    Use caution when washing and drying laundry: Don't shake dirty laundry and use the warmest water setting that you can.    For more tips on managing your health at home, go to www.cdc.gov/coronavirus/2019-ncov/downloads/10Things.pdf.  How can I take care of myself at home?  1. Get lots of rest. Drink extra fluids (unless a doctor has told you not to).  2. Take Tylenol (acetaminophen) for fever or pain. If you have liver or kidney problems, ask your family doctor if it's okay to take Tylenol.   Adults can take either:   ? 650 mg (two 325 mg pills) every 4 to 6 hours, or   ? 1,000 mg (two 500 mg pills) every 8 hours as needed.  ? Note: Don't take more than 3,000 mg in one day. Acetaminophen is found in many medicines (both prescribed and over-the-counter medicines). Read all labels to be sure you don't take too much.   For children, check the Tylenol bottle for the right dose. The dose is based on the child's age or weight.  3. If you have other health problems (like cancer, heart failure, an organ transplant or severe kidney disease): Call your specialty clinic if you don't feel better in the next 2 days.  4. Know when to call 911. Emergency warning signs include:  ? Trouble breathing or  shortness of breath  ? Pain or pressure in the chest that doesn't go away  ? Feeling confused like you haven't felt before, or not being able to wake up  ? Bluish-colored lips or face  5. Your doctor may have prescribed a blood thinner medicine. Follow their instructions.  Where can I get more information?    [facility] - About COVID-19:   [email or web address]    CDC - What to Do If You're Sick: www.cdc.gov/coronavirus/2019-ncov/about/steps-when-sick.html    CDC - Ending Home Isolation: www.cdc.gov/coronavirus/2019-ncov/hcp/disposition-in-home-patients.html    CDC - Caring for Someone: www.cdc.gov/coronavirus/2019-ncov/if-you-are-sick/care-for-someone.html    [facility] - Interim Guidance for Hospital Discharge to Home: www.health.[facility].[place].us/diseases/coronavirus/hcp/hospdischarge.pdf    Below are the COVID-19 hotlines at the [place] Department of Health ([facility]). Interpreters are available.  ? For health questions: Call 610-331-2751 or 1-950.544.8185 (7 a.m. to 7 p.m.)  ? For questions about schools and childcare: Call 861-717-0914 or 1-157.580.9121 (7 a.m. to 7 p.m.)    For informational purposes only. Not to replace the advice of your health care provider. Clinically reviewed by Dr. Jose Ashraf.   Copyright   2020 [facility]. All rights reserved. [employer] [id number] - REV 01/05/21.      Type 2 diabetes mellitus without complication, without long-term current use of insulin (H)  -has been checking glucose, so far in a normal range    Lab Results   Component Value Date    A1C 6.3 04/23/2021    A1C 6.5 10/28/2020    A1C 6.6 07/31/2020    A1C 6.4 12/05/2019    A1C 6.5 07/30/2019       Morbid obesity (H)  Wt Readings from Last 2 Encounters:   09/22/21 87.7 kg (193 lb 6.4 oz)   06/15/21 83.9 kg (185 lb)         Ordering of each unique test  Prescription drug management  15 minutes spent on the date of the encounter doing chart review, history and exam, documentation and further  activities per the note       BMI:   Estimated body mass index is 37.77 kg/m  as calculated from the following:    Height as of 9/22/21: 1.524 m (5').    Weight as of 9/22/21: 87.7 kg (193 lb 6.4 oz).     Return in about 1 day (around 12/2/2021) for labs.    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 No boosters.    is also sick.   No chest pain  No wheezing  No sore throat  No new rash     Diabetes:  Blood sugars have been low, 123 right now, has been in the 80s and 90s.                   Review of Systems   Constitutional, HEENT, cardiovascular, pulmonary, gi and gu systems are negative, except as otherwise noted.      Objective           Vitals:  No vitals were obtained today due to virtual visit.    Physical Exam   healthy, alert and no distress  PSYCH: Alert and oriented times 3; coherent speech, normal   rate and volume, able to articulate logical thoughts, able   to abstract reason, no tangential thoughts, no hallucinations   or delusions  Her affect is normal  RESP: No cough, no audible wheezing, able to talk in full sentences  Remainder of exam unable to be completed due to telephone visits        Phone call duration: 7 minutes

## 2021-12-02 ENCOUNTER — LAB (OUTPATIENT)
Dept: URGENT CARE | Facility: URGENT CARE | Age: 66
End: 2021-12-02
Attending: PREVENTIVE MEDICINE
Payer: COMMERCIAL

## 2021-12-02 DIAGNOSIS — Z20.822 SUSPECTED 2019 NOVEL CORONAVIRUS INFECTION: ICD-10-CM

## 2021-12-02 PROCEDURE — U0003 INFECTIOUS AGENT DETECTION BY NUCLEIC ACID (DNA OR RNA); SEVERE ACUTE RESPIRATORY SYNDROME CORONAVIRUS 2 (SARS-COV-2) (CORONAVIRUS DISEASE [COVID-19]), AMPLIFIED PROBE TECHNIQUE, MAKING USE OF HIGH THROUGHPUT TECHNOLOGIES AS DESCRIBED BY CMS-2020-01-R: HCPCS

## 2021-12-02 PROCEDURE — U0005 INFEC AGEN DETEC AMPLI PROBE: HCPCS

## 2021-12-03 ENCOUNTER — TELEPHONE (OUTPATIENT)
Dept: FAMILY MEDICINE | Facility: CLINIC | Age: 66
End: 2021-12-03
Payer: COMMERCIAL

## 2021-12-03 LAB — SARS-COV-2 RNA RESP QL NAA+PROBE: POSITIVE

## 2021-12-03 RX ORDER — FLASH GLUCOSE SENSOR
KIT MISCELLANEOUS
Qty: 2 EACH | Refills: 0 | Status: SHIPPED | OUTPATIENT
Start: 2021-12-03

## 2021-12-03 NOTE — RESULT ENCOUNTER NOTE
Santiago Yanes test was positive.     Please do not hesitate to call us at [phone] if you have any questions or concerns.    Thank you,    Eri Lee MD MPH

## 2021-12-03 NOTE — TELEPHONE ENCOUNTER
asking for covid19 results while this nurse was notifying patient of his covid19 results.  No consent to communicate on file.   has access to wife's mychart and will look there for results

## 2021-12-20 ENCOUNTER — NURSE TRIAGE (OUTPATIENT)
Dept: FAMILY MEDICINE | Facility: CLINIC | Age: 66
End: 2021-12-20
Payer: COMMERCIAL

## 2021-12-20 DIAGNOSIS — E11.9 TYPE 2 DIABETES MELLITUS WITHOUT COMPLICATION, WITHOUT LONG-TERM CURRENT USE OF INSULIN (H): ICD-10-CM

## 2021-12-20 NOTE — TELEPHONE ENCOUNTER
Patient called the clinic.  Patient and spouse are recovering from COVID and continues to have a dry cough (epecialy with cold weather and talking for a long period), easily fatigued and tired and chronic upset stomach.  The shortness of breath improved.  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  Patient denies any other symptoms or concerns at this time.    Review the quarantine requirements.  Patient was encouraged to update her family of her recovery status and abilities.    Patient requested to schedule an appointment for a physical and medication refill.  Assisted with scheduling an appointment.    Patient was advised to call the clinic [phone] or seek medical assistance at the nearest ER or , if the symptoms persist or worsen before scheduled appointment.    Patient verbalized understanding and has no further questions or concerns at this time.      Reason for Disposition    [1] COVID-19 diagnosed by positive lab test AND [2] mild symptoms (e.g., cough, fever, others) AND [3] no complications or SOB    Additional Information    Negative: SEVERE difficulty breathing (e.g., struggling for each breath, speaks in single words)    Negative: Difficult to awaken or acting confused (e.g., disoriented, slurred speech)    Negative: Bluish (or gray) lips or face now    Negative: Shock suspected (e.g., cold/pale/clammy skin, too weak to stand, low BP, rapid pulse)    Negative: Sounds like a life-threatening emergency to the triager    Negative: [1] COVID-19 exposure AND [2] no symptoms    Negative: COVID-19 vaccine reaction suspected (e.g., fever, headache, muscle aches) occurring 1 to 3 days after getting vaccine    Negative: COVID-19 vaccine, questions about    Negative: [1] Lives with someone known to have influenza  (flu test positive) AND [2] flu-like symptoms (e.g., cough, runny nose, sore throat, SOB; with or without fever)    Negative: [1] Adult with possible COVID-19 symptoms AND [2] triager concerned about severity of symptoms or other causes    Negative: COVID-19 and breastfeeding, questions about    Negative: SEVERE or constant chest pain or pressure (Exception: mild central chest pain, present only when coughing)    Negative: MODERATE difficulty breathing (e.g., speaks in phrases, SOB even at rest, pulse 100-120)    Negative: [1] Headache AND [2] stiff neck (can't touch chin to chest)    Negative: MILD difficulty breathing (e.g., minimal/no SOB at rest, SOB with walking, pulse <100)    Negative: Chest pain or pressure    Negative: Patient sounds very sick or weak to the triager    Negative: Fever > 103 F (39.4 C)    Negative: [1] Fever > 101 F (38.3 C) AND [2] age > 60 years    Negative: [1] Fever > 100.0 F (37.8 C) AND [2] bedridden (e.g., nursing home patient, CVA, chronic illness, recovering from surgery)    Negative: HIGH RISK for severe COVID complications (e.g., age > 64 years, obesity with BMI > 25, pregnant, chronic lung disease or other chronic medical condition)  (Exception: Already seen by PCP and no new or worsening symptoms.)    Negative: [1] HIGH RISK patient AND [2] influenza is widespread in the community AND [3] ONE OR MORE respiratory symptoms: cough, sore throat, runny or stuffy nose    Negative: [1] HIGH RISK patient AND [2] influenza exposure within the last 7 days AND [3] ONE OR MORE respiratory symptoms: cough, sore throat, runny or stuffy nose    Negative: [1] COVID-19 infection suspected by caller or triager AND [2] mild symptoms (cough, fever, or others) AND [3] negative COVID-19 rapid test    Negative: Fever present > 3 days (72 hours)    Negative: [1] Fever returns after gone for over 24 hours AND [2] symptoms worse or not improved    Negative: [1] Continuous (nonstop) coughing interferes  with work or school AND [2] no improvement using cough treatment per protocol    Negative: Cough present > 3 weeks    Negative: [1] COVID-19 diagnosed by positive lab test AND [2] NO symptoms (e.g., cough, fever, others)    Protocols used: CORONAVIRUS (COVID-19) DIAGNOSED OR [id number] 8.25.2021

## 2021-12-20 NOTE — TELEPHONE ENCOUNTER
Patient is requesting refill for the Continuous Blood Gluc Sensor (FREESTYLE LESLIE 14 DAY SENSOR) [facility] [place].    Patient has not applied the sensor since COVID diagnosis.

## 2021-12-24 NOTE — TELEPHONE ENCOUNTER
Requested Prescriptions   Pending Prescriptions Disp Refills     Continuous Blood Gluc Sensor (FREESTYLE LESLIE 14 DAY SENSOR) MISC 6 each 3     Sig: Change every 14 days.       There is no refill protocol information for this order        Routing refill request to provider for review/approval because:  Drug not on the [facility] refill protocol

## 2021-12-29 RX ORDER — FLASH GLUCOSE SENSOR
KIT MISCELLANEOUS
Qty: 6 EACH | Refills: 3 | Status: SHIPPED | OUTPATIENT
Start: 2021-12-29

## 2022-01-02 ENCOUNTER — HEALTH MAINTENANCE LETTER (OUTPATIENT)
Age: 67
End: 2022-01-02

## 2022-01-04 DIAGNOSIS — K21.9 GASTROESOPHAGEAL REFLUX DISEASE WITHOUT ESOPHAGITIS: ICD-10-CM

## 2022-01-04 DIAGNOSIS — J45.40 MODERATE PERSISTENT ASTHMA, UNCOMPLICATED: ICD-10-CM

## 2022-01-04 RX ORDER — TOPIRAMATE 50 MG/1
50 TABLET, FILM COATED ORAL 2 TIMES DAILY
Qty: 60 TABLET | Refills: 0 | Status: SHIPPED | OUTPATIENT
Start: 2022-01-04 | End: 2022-01-07

## 2022-01-04 NOTE — TELEPHONE ENCOUNTER
Refill request for Topamax 50mg. Pt last seen in 9/22/21 and has follow up scheduled for 1/26/22. Will send refill to get pt through to next appt.     Tracy Smith RN on 1/4/2022 at 3:43 PM

## 2022-01-06 RX ORDER — MONTELUKAST SODIUM 10 MG/1
TABLET ORAL
Qty: 90 TABLET | Refills: 0 | Status: SHIPPED | OUTPATIENT
Start: 2022-01-06 | End: 2022-01-07

## 2022-01-06 RX ORDER — OMEPRAZOLE 40 MG/1
CAPSULE, DELAYED RELEASE ORAL
Qty: 90 CAPSULE | Refills: 0 | Status: SHIPPED | OUTPATIENT
Start: 2022-01-06 | End: 2022-01-07

## 2022-01-07 ENCOUNTER — ANCILLARY PROCEDURE (OUTPATIENT)
Dept: GENERAL RADIOLOGY | Facility: CLINIC | Age: 67
End: 2022-01-07
Attending: FAMILY MEDICINE
Payer: COMMERCIAL

## 2022-01-07 ENCOUNTER — OFFICE VISIT (OUTPATIENT)
Dept: FAMILY MEDICINE | Facility: CLINIC | Age: 67
End: 2022-01-07
Payer: COMMERCIAL

## 2022-01-07 VITALS
DIASTOLIC BLOOD PRESSURE: 76 MMHG | SYSTOLIC BLOOD PRESSURE: 121 MMHG | WEIGHT: 190 LBS | HEIGHT: 60 IN | TEMPERATURE: 98.8 F | BODY MASS INDEX: 37.3 KG/M2 | HEART RATE: 81 BPM | OXYGEN SATURATION: 97 %

## 2022-01-07 DIAGNOSIS — F33.42 MAJOR DEPRESSIVE DISORDER, RECURRENT, IN FULL REMISSION (H): ICD-10-CM

## 2022-01-07 DIAGNOSIS — K21.9 GASTROESOPHAGEAL REFLUX DISEASE WITHOUT ESOPHAGITIS: ICD-10-CM

## 2022-01-07 DIAGNOSIS — J45.40 MODERATE PERSISTENT ASTHMA, UNCOMPLICATED: ICD-10-CM

## 2022-01-07 DIAGNOSIS — R07.89 CHEST WALL PAIN: ICD-10-CM

## 2022-01-07 DIAGNOSIS — J45.40 MODERATE PERSISTENT ASTHMA WITHOUT COMPLICATION: ICD-10-CM

## 2022-01-07 DIAGNOSIS — Z11.4 SCREENING FOR HIV (HUMAN IMMUNODEFICIENCY VIRUS): ICD-10-CM

## 2022-01-07 DIAGNOSIS — E66.01 MORBID OBESITY (H): ICD-10-CM

## 2022-01-07 DIAGNOSIS — Z12.31 ENCOUNTER FOR SCREENING MAMMOGRAM FOR MALIGNANT NEOPLASM OF BREAST: ICD-10-CM

## 2022-01-07 DIAGNOSIS — G43.009 MIGRAINE WITHOUT AURA AND WITHOUT STATUS MIGRAINOSUS, NOT INTRACTABLE: ICD-10-CM

## 2022-01-07 DIAGNOSIS — E11.649 UNCONTROLLED TYPE 2 DIABETES MELLITUS WITH HYPOGLYCEMIA WITHOUT COMA (H): ICD-10-CM

## 2022-01-07 DIAGNOSIS — Z00.00 ROUTINE GENERAL MEDICAL EXAMINATION AT A HEALTH CARE FACILITY: Primary | ICD-10-CM

## 2022-01-07 DIAGNOSIS — I10 HYPERTENSION GOAL BP (BLOOD PRESSURE) < 130/80: ICD-10-CM

## 2022-01-07 DIAGNOSIS — Z13.220 SCREENING FOR HYPERLIPIDEMIA: ICD-10-CM

## 2022-01-07 DIAGNOSIS — E11.9 TYPE 2 DIABETES, DIET CONTROLLED (H): ICD-10-CM

## 2022-01-07 LAB
ALBUMIN SERPL-MCNC: 3.9 G/DL (ref 3.4–5)
ALP SERPL-CCNC: 113 U/L (ref 40–150)
ALT SERPL W P-5'-P-CCNC: 53 U/L (ref 0–50)
ANION GAP SERPL CALCULATED.3IONS-SCNC: 4 MMOL/L (ref 3–14)
AST SERPL W P-5'-P-CCNC: 24 U/L (ref 0–45)
BILIRUB SERPL-MCNC: 0.2 MG/DL (ref 0.2–1.3)
BUN SERPL-MCNC: 18 MG/DL (ref 7–30)
CALCIUM SERPL-MCNC: 9.2 MG/DL (ref 8.5–10.1)
CHLORIDE BLD-SCNC: 113 MMOL/L (ref 94–109)
CHOLEST SERPL-MCNC: 199 MG/DL
CO2 SERPL-SCNC: 25 MMOL/L (ref 20–32)
CREAT SERPL-MCNC: 0.85 MG/DL (ref 0.52–1.04)
CREAT UR-MCNC: 58 MG/DL
FASTING STATUS PATIENT QL REPORTED: NO
GFR SERPL CREATININE-BSD FRML MDRD: 76 ML/MIN/1.73M2
GLUCOSE BLD-MCNC: 128 MG/DL (ref 70–99)
HBA1C MFR BLD: 6.6 % (ref 0–5.6)
HDLC SERPL-MCNC: 50 MG/DL
LDLC SERPL CALC-MCNC: 105 MG/DL
MICROALBUMIN UR-MCNC: 6 MG/L
MICROALBUMIN/CREAT UR: 10.34 MG/G CR (ref 0–25)
NONHDLC SERPL-MCNC: 149 MG/DL
POTASSIUM BLD-SCNC: 4.7 MMOL/L (ref 3.4–5.3)
PROT SERPL-MCNC: 7.6 G/DL (ref 6.8–8.8)
SODIUM SERPL-SCNC: 142 MMOL/L (ref 133–144)
TRIGL SERPL-MCNC: 220 MG/DL

## 2022-01-07 PROCEDURE — 80061 LIPID PANEL: CPT | Performed by: FAMILY MEDICINE

## 2022-01-07 PROCEDURE — 80053 COMPREHEN METABOLIC PANEL: CPT | Performed by: FAMILY MEDICINE

## 2022-01-07 PROCEDURE — 83036 HEMOGLOBIN GLYCOSYLATED A1C: CPT | Performed by: FAMILY MEDICINE

## 2022-01-07 PROCEDURE — 99396 PREV VISIT EST AGE 40-64: CPT | Performed by: FAMILY MEDICINE

## 2022-01-07 PROCEDURE — 99214 OFFICE O/P EST MOD 30 MIN: CPT | Mod: 25 | Performed by: FAMILY MEDICINE

## 2022-01-07 PROCEDURE — 87389 HIV-1 AG W/HIV-1&-2 AB AG IA: CPT | Performed by: FAMILY MEDICINE

## 2022-01-07 PROCEDURE — 82043 UR ALBUMIN QUANTITATIVE: CPT | Performed by: FAMILY MEDICINE

## 2022-01-07 PROCEDURE — 36415 COLL VENOUS BLD VENIPUNCTURE: CPT | Performed by: FAMILY MEDICINE

## 2022-01-07 PROCEDURE — 71046 X-RAY EXAM CHEST 2 VIEWS: CPT | Performed by: RADIOLOGY

## 2022-01-07 RX ORDER — MONTELUKAST SODIUM 10 MG/1
TABLET ORAL
Qty: 90 TABLET | Refills: 1 | Status: SHIPPED | OUTPATIENT
Start: 2022-01-07

## 2022-01-07 RX ORDER — METFORMIN HCL 500 MG
500 TABLET, EXTENDED RELEASE 24 HR ORAL
Qty: 90 TABLET | Refills: 1 | Status: SHIPPED | OUTPATIENT
Start: 2022-01-07

## 2022-01-07 RX ORDER — TOPIRAMATE 50 MG/1
50 TABLET, FILM COATED ORAL 2 TIMES DAILY
Qty: 60 TABLET | Refills: 0 | Status: SHIPPED | OUTPATIENT
Start: 2022-01-07 | End: 2022-01-26

## 2022-01-07 RX ORDER — OMEPRAZOLE 40 MG/1
CAPSULE, DELAYED RELEASE ORAL
Qty: 90 CAPSULE | Refills: 1 | Status: SHIPPED | OUTPATIENT
Start: 2022-01-07

## 2022-01-07 RX ORDER — ALBUTEROL SULFATE 90 UG/1
2 AEROSOL, METERED RESPIRATORY (INHALATION) EVERY 4 HOURS PRN
Qty: 18 G | Refills: 3 | Status: SHIPPED | OUTPATIENT
Start: 2022-01-07

## 2022-01-07 RX ORDER — LISINOPRIL 20 MG/1
TABLET ORAL
Qty: 45 TABLET | Refills: 3 | Status: SHIPPED | OUTPATIENT
Start: 2022-01-07

## 2022-01-07 ASSESSMENT — ASTHMA QUESTIONNAIRES
QUESTION_2 LAST FOUR WEEKS HOW OFTEN HAVE YOU HAD SHORTNESS OF BREATH: ONCE OR TWICE A WEEK
QUESTION_5 LAST FOUR WEEKS HOW WOULD YOU RATE YOUR ASTHMA CONTROL: WELL CONTROLLED
QUESTION_3 LAST FOUR WEEKS HOW OFTEN DID YOUR ASTHMA SYMPTOMS (WHEEZING, COUGHING, SHORTNESS OF BREATH, CHEST TIGHTNESS OR PAIN) WAKE YOU UP AT NIGHT OR EARLIER THAN USUAL IN THE MORNING: NOT AT ALL
ACT_TOTALSCORE: 23
QUESTION_4 LAST FOUR WEEKS HOW OFTEN HAVE YOU USED YOUR RESCUE INHALER OR NEBULIZER MEDICATION (SUCH AS ALBUTEROL): NOT AT ALL
QUESTION_1 LAST FOUR WEEKS HOW MUCH OF THE TIME DID YOUR ASTHMA KEEP YOU FROM GETTING AS MUCH DONE AT WORK, SCHOOL OR AT HOME: NONE OF THE TIME

## 2022-01-07 ASSESSMENT — ENCOUNTER SYMPTOMS
BREAST MASS: 0
FREQUENCY: 0
SHORTNESS OF BREATH: 0
CONSTIPATION: 0
HEMATURIA: 0
FEVER: 0
ABDOMINAL PAIN: 0
MYALGIAS: 0
JOINT SWELLING: 0
PARESTHESIAS: 0
CHILLS: 0
NAUSEA: 0
PALPITATIONS: 0
COUGH: 0
SORE THROAT: 0
DYSURIA: 0
EYE PAIN: 0
HEMATOCHEZIA: 0
DIARRHEA: 0
DIZZINESS: 0
WEAKNESS: 0
HEARTBURN: 0

## 2022-01-07 ASSESSMENT — PATIENT HEALTH QUESTIONNAIRE - PHQ9
SUM OF ALL RESPONSES TO PHQ QUESTIONS 1-9: 8
10. IF YOU CHECKED OFF ANY PROBLEMS, HOW DIFFICULT HAVE THESE PROBLEMS MADE IT FOR YOU TO DO YOUR WORK, TAKE CARE OF THINGS AT HOME, OR GET ALONG WITH OTHER PEOPLE: SOMEWHAT DIFFICULT
SUM OF ALL RESPONSES TO PHQ QUESTIONS 1-9: 8

## 2022-01-07 ASSESSMENT — MIFFLIN-ST. JEOR: SCORE: 1333.33
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
## 2022-01-08 LAB — HIV 1+2 AB+HIV1 P24 AG SERPL QL IA: NONREACTIVE

## 2022-01-08 ASSESSMENT — ASTHMA QUESTIONNAIRES: ACT_TOTALSCORE: 23

## 2022-01-08 ASSESSMENT — PATIENT HEALTH QUESTIONNAIRE - PHQ9: SUM OF ALL RESPONSES TO PHQ QUESTIONS 1-9: 8

## 2022-01-11 ENCOUNTER — OFFICE VISIT (OUTPATIENT)
Dept: FAMILY MEDICINE | Facility: CLINIC | Age: 67
End: 2022-01-11
Payer: COMMERCIAL

## 2022-01-11 VITALS
WEIGHT: 188.8 LBS | OXYGEN SATURATION: 94 % | DIASTOLIC BLOOD PRESSURE: 76 MMHG | SYSTOLIC BLOOD PRESSURE: 144 MMHG | BODY MASS INDEX: 36.87 KG/M2 | TEMPERATURE: 98.5 F | HEART RATE: 79 BPM

## 2022-01-11 DIAGNOSIS — Z12.4 CERVICAL CANCER SCREENING: Primary | ICD-10-CM

## 2022-01-11 DIAGNOSIS — E11.649 UNCONTROLLED TYPE 2 DIABETES MELLITUS WITH HYPOGLYCEMIA WITHOUT COMA (H): ICD-10-CM

## 2022-01-11 DIAGNOSIS — F33.42 MAJOR DEPRESSIVE DISORDER, RECURRENT, IN FULL REMISSION (H): ICD-10-CM

## 2022-01-11 DIAGNOSIS — E66.01 MORBID OBESITY (H): ICD-10-CM

## 2022-01-11 PROCEDURE — G0145 SCR C/V CYTO,THINLAYER,RESCR: HCPCS | Performed by: FAMILY MEDICINE

## 2022-01-11 PROCEDURE — 87624 HPV HI-RISK TYP POOLED RSLT: CPT | Performed by: FAMILY MEDICINE

## 2022-01-11 PROCEDURE — 99213 OFFICE O/P EST LOW 20 MIN: CPT | Performed by: FAMILY MEDICINE

## 2022-01-11 NOTE — PROGRESS NOTES
Assessment & Plan       ICD-10-CM    1. Cervical cancer screening  Z12.4 Pap imaged thin layer screen with HPV - recommended age 30 - 65 years (select HPV order below)   2. Major depressive disorder, recurrent, in full remission (H)  F33.42    3. Uncontrolled type 2 diabetes mellitus with hypoglycemia without coma (H)  E11.649    4. Morbid obesity (H)  E66.01          Review of external notes as documented elsewhere in note      Villa Blake MD  [facility] KENDRA Yanes is a 66 year old who presents for the following health issues     HPI     Chief Complaint   Patient presents with     Gyn Exam     Patient presents for pap        Review of Systems   Constitutional, HEENT, cardiovascular, pulmonary, gi and gu systems are negative, except as otherwise noted.      Objective    BP (!) 144/76   Pulse 79   Temp 98.5  F (36.9  C) (Oral)   Wt 85.6 kg (188 lb 12.8 oz)   SpO2 94%   BMI 36.87 kg/m    Body mass index is 36.87 kg/m .  Physical Exam   GENERAL: healthy, alert and no distress   (female): normal female external genitalia, normal urethral meatus, vaginal mucosa pink, moist, well rugated, and normal cervix/adnexa/uterus without masses or discharge  PSYCH: mentation appears normal, affect normal/bright

## 2022-01-13 LAB
BKR LAB AP GYN ADEQUACY: NORMAL
BKR LAB AP GYN INTERPRETATION: NORMAL
PATH REPORT.COMMENTS IMP SPEC: NORMAL
PATH REPORT.COMMENTS IMP SPEC: NORMAL

## 2022-01-25 ENCOUNTER — TELEPHONE (OUTPATIENT)
Dept: FAMILY MEDICINE | Facility: CLINIC | Age: 67
End: 2022-01-25
Payer: COMMERCIAL

## 2022-01-25 NOTE — TELEPHONE ENCOUNTER
Hi Dr Barakat,    I have sent Farzana's pap result to you with a question regarding adding HPV. I sent the first message to you on 1/14/22 and have not received a response. I am only able to add the HPV for 14 days so today would be the last day I can add that.     Please see inbasket messages for pts pap and respond today if possible.      Respectfully,    JOHN WymanN, RN  Pap Tracking Nurse

## 2022-01-26 ENCOUNTER — OFFICE VISIT (OUTPATIENT)
Dept: NEUROLOGY | Facility: CLINIC | Age: 67
End: 2022-01-26
Payer: COMMERCIAL

## 2022-01-26 ENCOUNTER — TELEPHONE (OUTPATIENT)
Dept: NEUROLOGY | Facility: CLINIC | Age: 67
End: 2022-01-26

## 2022-01-26 VITALS
DIASTOLIC BLOOD PRESSURE: 64 MMHG | SYSTOLIC BLOOD PRESSURE: 124 MMHG | WEIGHT: 188 LBS | HEIGHT: 60 IN | BODY MASS INDEX: 36.91 KG/M2 | HEART RATE: 89 BPM

## 2022-01-26 DIAGNOSIS — G43.009 MIGRAINE WITHOUT AURA AND WITHOUT STATUS MIGRAINOSUS, NOT INTRACTABLE: Primary | ICD-10-CM

## 2022-01-26 DIAGNOSIS — R40.4 SPELL OF ALTERED CONSCIOUSNESS: ICD-10-CM

## 2022-01-26 DIAGNOSIS — Z00.00 ROUTINE GENERAL MEDICAL EXAMINATION AT A HEALTH CARE FACILITY: ICD-10-CM

## 2022-01-26 DIAGNOSIS — Z79.899 ENCOUNTER FOR LONG-TERM (CURRENT) USE OF MEDICATIONS: ICD-10-CM

## 2022-01-26 LAB
HUMAN PAPILLOMA VIRUS 16 DNA: NEGATIVE
HUMAN PAPILLOMA VIRUS 18 DNA: NEGATIVE
HUMAN PAPILLOMA VIRUS FINAL DIAGNOSIS: NORMAL
HUMAN PAPILLOMA VIRUS OTHER HR: NEGATIVE

## 2022-01-26 PROCEDURE — 99214 OFFICE O/P EST MOD 30 MIN: CPT | Performed by: PSYCHIATRY & NEUROLOGY

## 2022-01-26 RX ORDER — GABAPENTIN 300 MG/1
300 CAPSULE ORAL AT BEDTIME
Qty: 90 CAPSULE | Refills: 1 | Status: SHIPPED | OUTPATIENT
Start: 2022-01-26 | End: 2022-08-25

## 2022-01-26 RX ORDER — GABAPENTIN 100 MG/1
200 CAPSULE ORAL DAILY
Status: CANCELLED | OUTPATIENT
Start: 2022-01-26

## 2022-01-26 RX ORDER — GABAPENTIN 100 MG/1
200 CAPSULE ORAL DAILY
Qty: 30 CAPSULE | Refills: 1 | Status: SHIPPED | OUTPATIENT
Start: 2022-01-26 | End: 2022-01-26

## 2022-01-26 RX ORDER — GABAPENTIN 100 MG/1
CAPSULE ORAL
Qty: 60 CAPSULE | Refills: 1 | Status: SHIPPED | OUTPATIENT
Start: 2022-01-26

## 2022-01-26 RX ORDER — TOPIRAMATE 50 MG/1
50 TABLET, FILM COATED ORAL 2 TIMES DAILY
Qty: 60 TABLET | Refills: 4 | Status: SHIPPED | OUTPATIENT
Start: 2022-01-26 | End: 2023-02-15

## 2022-01-26 RX ORDER — GABAPENTIN 300 MG/1
300 CAPSULE ORAL AT BEDTIME
Qty: 90 CAPSULE | Refills: 0 | Status: CANCELLED | OUTPATIENT
Start: 2022-01-26

## 2022-01-26 ASSESSMENT — MIFFLIN-ST. JEOR: SCORE: 1324.26

## 2022-01-26 NOTE — TELEPHONE ENCOUNTER
Health Call Center    Phone Message    May a detailed message be left on voicemail: yes     Reason for Call: Medication Question or concern regarding medication   Prescription Clarification  Name of Medication: gabapentin (NEURONTIN) 100 MG capsule  Prescribing Provider: Dr. Nagi Caputo   Pharmacy: [facility] [address] [place] [address]   What on the order needs clarification? Pharmacy needs clarification on the directions.  Please call pharmacy and speak to a pharmacist. Thank you.          Action Taken: Message sent to : [facility]  Travel Screening: Not Applicable

## 2022-01-26 NOTE — TELEPHONE ENCOUNTER
"Per Dr. Blake, the original order says \"with HPV\" and is unsure why it would need to be added.    Per chart review, \"HPV High Risk Types DNA Cervical: [id number]\" order was placed 1/11/22 in addition to \"Pap imaged thin layer screen with HPV - recommended age 30 - 65 years (select HPV order below): [id number]\" also ordered 1/11/22 by Dr. Ayon.    Will route back to Nathan Hernandez RN for update.    Sarita POWELL RN, BSN  [facility]           "

## 2022-01-26 NOTE — PROGRESS NOTES
ESTABLISHED PATIENT NEUROLOGY NOTE    DATE OF VISIT: 1/26/2022  MRN: [id number]  PATIENT NAME: Farzana Hughes  YOB: 1955    Chief Complaint   Patient presents with     Headache     Follow up      SUBJECTIVE:                                                      HISTORY OF PRESENT ILLNESS:  Farzana is here for follow up regarding headaches.  She has a long history of migraines, relatively well controlled for several years on long-acting topiramate.  We have tried to start her on Emgality, but this has been an issue insurance wise.  In addition, it has been hard to convince them to continue the XR topiramate.  I also tried putting in a prescription for Aimovig but this too was denied.  In the meantime, we have been trying the topiramate short acting formulation.  When I saw Farzana in 9.2021, she said that the headaches were tolerable on the current Topamax formulation.  She also had some concerns at that time about increased stress, with her  wanting to move to [place].  She mentioned that her pharmacist said she could possibly get coverage for the Emgality if the first couple of injections were actually done in clinic.  It was thereafter that the request for Aimovig failed.    Farzana is also on as needed rizatriptan.  She was previously on Depakote and Imitrex.  Topamax was started in 2012.  She has additional history of restless leg syndrome for which she takes gabapentin.  Brain MRI in 2012 was unremarkable.  We had discussed Botox as an option but she had some concerns about this.    Since her previous visit, we discussed increasing her Topamax.  I also mentioned that I had be happy to send her to one of my colleagues in the headache clinic at the [facility] if desired.    Farzana says that her headaches are okay. She says that the topiramate still seems to be helpful in headache prevention.     She has not changed her topiramate dose, continues at 50 mg twice daily.  No side effects . She  recalls having some possible side effects on higher doses of the long-acting formulation.   She has occasional dizzy spells.  She also describes some episodes of being out of it.  Her  will be talking to her and she can hear him but does not respond.  She feels a little confused afterwards.  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    CURRENT MEDICATIONS:   albuterol (PROAIR HFA) 108 (90 Base) MCG/ACT inhaler, Inhale 2 puffs into the lungs every 4 hours as needed for shortness of breath / dyspnea  atorvastatin (LIPITOR) 40 MG tablet, Take 1 tablet (40 mg) by mouth daily Generic please  benzonatate (TESSALON) 200 MG capsule, Take 1 capsule (200 mg) by mouth 3 times daily as needed for cough  Continuous Blood Gluc Sensor (FREESTYLE LESLIE 14 DAY SENSOR) MISC, Change every 14 days.  Continuous Blood Gluc Sensor (FREESTYLE LESLIE 14 DAY SENSOR) MISC, CHANGE EVERY 14 DAYS.  cyclobenzaprine (FLEXERIL) 5 MG tablet, Take 1 tablet (5 mg) by mouth 3 times daily as needed for muscle spasms  diclofenac (VOLTAREN) 1 % topical gel, Apply 4 g topically 4 times daily as needed for moderate pain  doxepin (SINEQUAN) 10 MG capsule, Take 1 capsule (10 mg) by mouth At Bedtime  EPINEPHrine (EPIPEN 2-SALBADOR) 0.3 MG/0.3ML injection 2-pack, Inject 0.3 mLs (0.3 mg) into the muscle as needed for anaphylaxis  fluticasone (FLONASE) 50 MCG/ACT nasal spray, Spray 1 spray into both nostrils daily as needed for rhinitis or allergies  gabapentin (NEURONTIN) 100 MG capsule, Take 200 mg by mouth daily 100- 200mg in the Afternoon. Also takes 300 mg at bedtime  gabapentin (NEURONTIN) 300 MG capsule, Take 1 capsule (300 mg) by mouth At Bedtime  GARLIC 1000 MG OR CAPS, 1 tablet twice daily  lisinopril (ZESTRIL) 20 MG tablet, TAKE HALF TABLET BY MOUTH DAILY  metFORMIN (GLUCOPHAGE-XR) 500 MG 24 hr tablet, Take 1 tablet (500 mg) by mouth daily (with dinner)  montelukast (SINGULAIR) 10 MG tablet,  TAKE ONE TABLET BY MOUTH AT BEDTIME  olopatadine (PATADAY) 0.2 % ophthalmic solution, Place 1 drop into both eyes daily  omeprazole (PRILOSEC) 40 MG DR capsule, TAKE 1 CAPSULE BY MOUTH DAILY. TAKE 30 TO 60 MINUTES BEFORE A MEAL  order for DME, Equipment being ordered: 4 wheeled walker replacement  order for DME, Hinged knee brace - medium  order for DME, Equipment being ordered: Hinged knee brace  Probiotic Product (ALIGN PO), Once daily  rizatriptan (MAXALT) 10 MG tablet, Take 1 tablet (10 mg) by mouth at onset of headache for migraine May repeat in 2 hours. Max 3 tablets/24 hours.  scopolamine (TRANSDERM-SCOP) 1.5 MG patch 72 hr, Place onto the skin every 72 hours  topiramate (TOPAMAX) 25 MG tablet, Take 50 mg by mouth 2 times daily  blood glucose (FREESTYLE TEST STRIPS) test strip, Use to test blood sugar 1 times daily or as directed. To be used with Freestyle Bull CGM meter. (Patient not taking: Reported on 1/26/2022)  erenumab-aooe (AIMOVIG) 70 MG/ML injection, Inject 1 mL (70 mg) Subcutaneous every 30 days (Patient not taking: Reported on 1/26/2022)  ipratropium (ATROVENT) 0.06 % nasal spray, Spray 1-2 sprays into both nostrils 2 times daily (Patient not taking: Reported on 8/25/2021)  order for DME, Equipment being ordered: Silicone heel cup (Patient not taking: Reported on 6/15/2021)  Semaglutide 3 MG TABS, Take 1 tablet by mouth daily (Patient not taking: Reported on 1/26/2022)  topiramate ER (QUDEXY XR) 100 MG 24 hr capsule, Take 1 capsule (100 mg) by mouth daily (Patient not taking: Reported on 4/23/2021)    No current facility-administered medications on file prior to visit.      RECENT DIAGNOSTIC STUDIES:   Labs: No results found for any visits on 01/26/22.      REVIEW OF SYSTEMS:                                                      10-point review of systems is negative except as mentioned above in HPI.    EXAM:                                                      Physical Exam:   Vitals: /64  (BP Location: Left arm, Patient Position: Sitting)   Pulse 89   Ht 1.524 m (5')   Wt 85.3 kg (188 lb)   BMI 36.72 kg/m    BMI= Body mass index is 36.72 kg/m .  GENERAL: NAD.  HEENT: NC/AT.  CV: RRR. S1, S2.   NECK: No bruits.  PULM: Non-labored breathing.   Focused Neurologic:  MENTAL STATUS: Alert, attentive. Speech is fluent.  Voice slightly hoarse.  Normal comprehension.  Normal concentration. Adequate fund of knowledge.  Perseverative about wearing the mask that we require.  CRANIAL NERVES: Discs flat. Visual fields intact to confrontation. Pupils equally, round and reactive to light. Facial sensation and movement normal. EOM full. Hearing intact to conversation. Trapezius strength intact. Palate moves symmetrically. Tongue midline.  MOTOR: 5/5 in proximal and distal muscle groups of upper and lower extremities. Tone and bulk normal.   SENSATION: Normal light touch throughout.    COORDINATION: Normal finger nose finger.   STATION AND GAIT: Casual gait is antalgic, otherwise normal.    ASSESSMENT and PLAN:                                                      Assessment:    ICD-10-CM    1. Migraine without aura and without status migrainosus, not intractable  G43.009 Topiramate Level     topiramate (TOPAMAX) 50 MG tablet   2. Routine general medical examination at a health care facility  Z00.00 topiramate (TOPAMAX) 50 MG tablet   3. Spell of altered consciousness  R40.4 Topiramate Level     EEG Routine   4. Encounter for long-term (current) use of medications  Z79.899 Topiramate Level        Ms. Hughes is a pleasant 66-year-old woman with a long history of migraine headaches here for follow-up.  The headaches seem to be under relatively good control on the short-acting topiramate at this time.  She also has as-needed rizatriptan.  I am concerned about some spacing out spells she describes, possible seizures.  We will go ahead and do an EEG and check her topiramate level.  I may want to adjust the dosing to  attempt to provide protection against seizure as well.  I would like to see Farzana back in clinic in 3-4 months.  She understands and agrees with the plan.    Plan:  Continue the topiramate at the current dose for now: 50mg twice daily.  I would like to check a topiramate level.  In addition, I recommend we do a routine EEG.  We will notify you of the test results and let you know if any changes are recommended.  Return to neurology clinic in 3-4 months.     Total Time: 30 minutes were spent with the patient and in chart review/documentation (as described above in Assessment and Plan)/coordinating the care on date of service.    Nivia Caputo MD  Neurology    [employer] software used in the dictation of this note.

## 2022-01-26 NOTE — PATIENT INSTRUCTIONS
Plan:  Continue the topiramate at the current dose for now: 50mg twice daily.  I would like to check a topiramate level.  In addition, I recommend we do a routine EEG.  We will notify you of the test results and let you know if any changes are recommended.  Return to neurology clinic in 3-4 months.

## 2022-01-26 NOTE — LETTER
1/26/2022         RE: Farzana Hughes  [address]  [place] [address]        Dear Colleague,    Thank you for referring your patient, Farzaan Hughes, to the [facility] [place]. Please see a copy of my visit note below.    ESTABLISHED PATIENT NEUROLOGY NOTE    DATE OF VISIT: 1/26/2022  MRN: [id number]  PATIENT NAME: Farzana Hughes  YOB: 1955    Chief Complaint   Patient presents with     Headache     Follow up      SUBJECTIVE:                                                      HISTORY OF PRESENT ILLNESS:  Farzana is here for follow up regarding headaches.  She has a long history of migraines, relatively well controlled for several years on long-acting topiramate.  We have tried to start her on Emgality, but this has been an issue insurance wise.  In addition, it has been hard to convince them to continue the XR topiramate.  I also tried putting in a prescription for Aimovig but this too was denied.  In the meantime, we have been trying the topiramate short acting formulation.  When I saw Farzana in 9.2021, she said that the headaches were tolerable on the current Topamax formulation.  She also had some concerns at that time about increased stress, with her  wanting to move to [place].  She mentioned that her pharmacist said she could possibly get coverage for the Emgality if the first couple of injections were actually done in clinic.  It was thereafter that the request for Aimovig failed.    Farzana is also on as needed rizatriptan.  She was previously on Depakote and Imitrex.  Topamax was started in 2012.  She has additional history of restless leg syndrome for which she takes gabapentin.  Brain MRI in 2012 was unremarkable.  We had discussed Botox as an option but she had some concerns about this.    Since her previous visit, we discussed increasing her Topamax.  I also mentioned that I had be happy to send her to one of my colleagues in the headache  clinic at the [facility] if desired.    Farzana says that her headaches are okay. She says that the topiramate still seems to be helpful in headache prevention.     She has not changed her topiramate dose, continues at 50 mg twice daily.  No side effects . She recalls having some possible side effects on higher doses of the long-acting formulation.   She has occasional dizzy spells.  She also describes some episodes of being out of it.  Her  will be talking to her and she can hear him but does not respond.  She feels a little confused afterwards.  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    CURRENT MEDICATIONS:   albuterol (PROAIR HFA) 108 (90 Base) MCG/ACT inhaler, Inhale 2 puffs into the lungs every 4 hours as needed for shortness of breath / dyspnea  atorvastatin (LIPITOR) 40 MG tablet, Take 1 tablet (40 mg) by mouth daily Generic please  benzonatate (TESSALON) 200 MG capsule, Take 1 capsule (200 mg) by mouth 3 times daily as needed for cough  Continuous Blood Gluc Sensor (FREESTYLE LESLIE 14 DAY SENSOR) MISC, Change every 14 days.  Continuous Blood Gluc Sensor (FREESTYLE LESLIE 14 DAY SENSOR) MISC, CHANGE EVERY 14 DAYS.  cyclobenzaprine (FLEXERIL) 5 MG tablet, Take 1 tablet (5 mg) by mouth 3 times daily as needed for muscle spasms  diclofenac (VOLTAREN) 1 % topical gel, Apply 4 g topically 4 times daily as needed for moderate pain  doxepin (SINEQUAN) 10 MG capsule, Take 1 capsule (10 mg) by mouth At Bedtime  EPINEPHrine (EPIPEN 2-SALBADOR) 0.3 MG/0.3ML injection 2-pack, Inject 0.3 mLs (0.3 mg) into the muscle as needed for anaphylaxis  fluticasone (FLONASE) 50 MCG/ACT nasal spray, Spray 1 spray into both nostrils daily as needed for rhinitis or allergies  gabapentin (NEURONTIN) 100 MG capsule, Take 200 mg by mouth daily 100- 200mg in the Afternoon. Also takes 300 mg at bedtime  gabapentin (NEURONTIN) 300 MG capsule, Take 1 capsule (300 mg) by mouth At  Bedtime  GARLIC 1000 MG OR CAPS, 1 tablet twice daily  lisinopril (ZESTRIL) 20 MG tablet, TAKE HALF TABLET BY MOUTH DAILY  metFORMIN (GLUCOPHAGE-XR) 500 MG 24 hr tablet, Take 1 tablet (500 mg) by mouth daily (with dinner)  montelukast (SINGULAIR) 10 MG tablet, TAKE ONE TABLET BY MOUTH AT BEDTIME  olopatadine (PATADAY) 0.2 % ophthalmic solution, Place 1 drop into both eyes daily  omeprazole (PRILOSEC) 40 MG DR capsule, TAKE 1 CAPSULE BY MOUTH DAILY. TAKE 30 TO 60 MINUTES BEFORE A MEAL  order for DME, Equipment being ordered: 4 wheeled walker replacement  order for DME, Hinged knee brace - medium  order for DME, Equipment being ordered: Hinged knee brace  Probiotic Product (ALIGN PO), Once daily  rizatriptan (MAXALT) 10 MG tablet, Take 1 tablet (10 mg) by mouth at onset of headache for migraine May repeat in 2 hours. Max 3 tablets/24 hours.  scopolamine (TRANSDERM-SCOP) 1.5 MG patch 72 hr, Place onto the skin every 72 hours  topiramate (TOPAMAX) 25 MG tablet, Take 50 mg by mouth 2 times daily  blood glucose (FREESTYLE TEST STRIPS) test strip, Use to test blood sugar 1 times daily or as directed. To be used with Freestyle Bull CGM meter. (Patient not taking: Reported on 1/26/2022)  erenumab-aooe (AIMOVIG) 70 MG/ML injection, Inject 1 mL (70 mg) Subcutaneous every 30 days (Patient not taking: Reported on 1/26/2022)  ipratropium (ATROVENT) 0.06 % nasal spray, Spray 1-2 sprays into both nostrils 2 times daily (Patient not taking: Reported on 8/25/2021)  order for DME, Equipment being ordered: Silicone heel cup (Patient not taking: Reported on 6/15/2021)  Semaglutide 3 MG TABS, Take 1 tablet by mouth daily (Patient not taking: Reported on 1/26/2022)  topiramate ER (QUDEXY XR) 100 MG 24 hr capsule, Take 1 capsule (100 mg) by mouth daily (Patient not taking: Reported on 4/23/2021)    No current facility-administered medications on file prior to visit.      RECENT DIAGNOSTIC STUDIES:   Labs: No results found for any visits  on 01/26/22.      REVIEW OF SYSTEMS:                                                      10-point review of systems is negative except as mentioned above in HPI.    EXAM:                                                      Physical Exam:   Vitals: /64 (BP Location: Left arm, Patient Position: Sitting)   Pulse 89   Ht 1.524 m (5')   Wt 85.3 kg (188 lb)   BMI 36.72 kg/m    BMI= Body mass index is 36.72 kg/m .  GENERAL: NAD.  HEENT: NC/AT.  CV: RRR. S1, S2.   NECK: No bruits.  PULM: Non-labored breathing.   Focused Neurologic:  MENTAL STATUS: Alert, attentive. Speech is fluent.  Voice slightly hoarse.  Normal comprehension.  Normal concentration. Adequate fund of knowledge.  Perseverative about wearing the mask that we require.  CRANIAL NERVES: Discs flat. Visual fields intact to confrontation. Pupils equally, round and reactive to light. Facial sensation and movement normal. EOM full. Hearing intact to conversation. Trapezius strength intact. Palate moves symmetrically. Tongue midline.  MOTOR: 5/5 in proximal and distal muscle groups of upper and lower extremities. Tone and bulk normal.   SENSATION: Normal light touch throughout.    COORDINATION: Normal finger nose finger.   STATION AND GAIT: Casual gait is antalgic, otherwise normal.    ASSESSMENT and PLAN:                                                      Assessment:    ICD-10-CM    1. Migraine without aura and without status migrainosus, not intractable  G43.009 Topiramate Level     topiramate (TOPAMAX) 50 MG tablet   2. Routine general medical examination at a health care facility  Z00.00 topiramate (TOPAMAX) 50 MG tablet   3. Spell of altered consciousness  R40.4 Topiramate Level     EEG Routine   4. Encounter for long-term (current) use of medications  Z79.899 Topiramate Level        Ms. Hughes is a pleasant 66-year-old woman with a long history of migraine headaches here for follow-up.  The headaches seem to be under relatively good control on the  short-acting topiramate at this time.  She also has as-needed rizatriptan.  I am concerned about some spacing out spells she describes, possible seizures.  We will go ahead and do an EEG and check her topiramate level.  I may want to adjust the dosing to attempt to provide protection against seizure as well.  I would like to see Farzana back in clinic in 3-4 months.  She understands and agrees with the plan.    Plan:  Continue the topiramate at the current dose for now: 50mg twice daily.  I would like to check a topiramate level.  In addition, I recommend we do a routine EEG.  We will notify you of the test results and let you know if any changes are recommended.  Return to neurology clinic in 3-4 months.     Total Time: 30 minutes were spent with the patient and in chart review/documentation (as described above in Assessment and Plan)/coordinating the care on date of service.    Nivia Caputo MD  Neurology    Dragon software used in the dictation of this note.                        Again, thank you for allowing me to participate in the care of your patient.        Sincerely,        Nivia Caputo MD

## 2022-01-26 NOTE — TELEPHONE ENCOUNTER
Sherman Stein,  I am just guessing it was perhaps not ordered correctly for some reason? . I had checked Epic and it was not in process , nor was it on the pap sheet as ordered. I called lab and it had not been ordered.     I went ahead and ordered it yesterday even though I had not heard back from the provider because if I had waited until today, we could no longer order it and I did not want the pt to have to return for another visit just to get an HPV done.    Thanks!  Nathan Hernandez, BSN, RN  Pap Tracking Nurse

## 2022-01-26 NOTE — TELEPHONE ENCOUNTER
Called patient's pharmacy and given si-200mg in the Afternoon. Pharmacist verbalized understanding.     Geo Olivier CMA@ on 2022 at 2:48 PM

## 2022-01-26 NOTE — NURSING NOTE
Chief Complaint   Patient presents with     Headache     Follow up      Geo Olivier CMA@ on 1/26/2022 at 12:48 PM

## 2022-02-10 ENCOUNTER — LAB (OUTPATIENT)
Dept: LAB | Facility: CLINIC | Age: 67
End: 2022-02-10
Payer: COMMERCIAL

## 2022-02-10 DIAGNOSIS — R40.4 SPELL OF ALTERED CONSCIOUSNESS: ICD-10-CM

## 2022-02-10 DIAGNOSIS — Z79.899 ENCOUNTER FOR LONG-TERM (CURRENT) USE OF MEDICATIONS: ICD-10-CM

## 2022-02-10 DIAGNOSIS — G43.009 MIGRAINE WITHOUT AURA AND WITHOUT STATUS MIGRAINOSUS, NOT INTRACTABLE: ICD-10-CM

## 2022-02-10 PROCEDURE — 99000 SPECIMEN HANDLING OFFICE-LAB: CPT

## 2022-02-10 PROCEDURE — 80201 ASSAY OF TOPIRAMATE: CPT | Mod: 90

## 2022-02-10 PROCEDURE — 36415 COLL VENOUS BLD VENIPUNCTURE: CPT

## 2022-02-12 LAB — TOPIRAMATE SERPL-MCNC: 2.5 UG/ML

## 2022-02-15 NOTE — RESULT ENCOUNTER NOTE
Please let Farzana know that her Topamax level came back low.  This is fine, and reasonable with the current dosing.  It does give us room to increase the dose if the headaches become more frequent again.  We will decide about need for increasing the dose based on the results of the EEG.  It does not look like she has this scheduled as of yet so I would encourage her to do so.  I am not seeing the order, so let me know if a new one is needed.    Thank you,Dr. Caputo

## 2022-03-01 ENCOUNTER — ANCILLARY PROCEDURE (OUTPATIENT)
Dept: MAMMOGRAPHY | Facility: CLINIC | Age: 67
End: 2022-03-01
Attending: FAMILY MEDICINE
Payer: COMMERCIAL

## 2022-03-01 DIAGNOSIS — Z12.31 ENCOUNTER FOR SCREENING MAMMOGRAM FOR MALIGNANT NEOPLASM OF BREAST: ICD-10-CM

## 2022-03-01 DIAGNOSIS — Z00.00 ROUTINE GENERAL MEDICAL EXAMINATION AT A HEALTH CARE FACILITY: ICD-10-CM

## 2022-03-01 PROCEDURE — 77067 SCR MAMMO BI INCL CAD: CPT | Mod: TC | Performed by: RADIOLOGY

## 2022-03-30 ENCOUNTER — TELEPHONE (OUTPATIENT)
Dept: PHARMACY | Facility: CLINIC | Age: 67
End: 2022-03-30
Payer: COMMERCIAL

## 2022-03-30 NOTE — TELEPHONE ENCOUNTER
Patient's current insurance does not cover MTM, we will stop reaching out to the patient at this time.    Pamella Roland, PharmD  Medication Therapy Management Pharmacist  [phone]

## 2022-05-01 DIAGNOSIS — E78.5 HYPERLIPIDEMIA LDL GOAL <130: ICD-10-CM

## 2022-05-01 NOTE — PATIENT INSTRUCTIONS
For your urinary symptoms, please increase your water intake and see if this helps.  If you continue to have symptoms please follow-up with your primary care provider for further evaluation.  Thank you   We wish you continued good healing. If you have any questions or concerns, please do not hesitate to contact us at [phone]    Please remember to call and schedule a follow up appointment if one was recommended at your earliest convenience.   PODIATRY CLINIC HOURS  TELEPHONE NUMBER    Dr. Jose Maria Corbett D.P.M [facility]    Clinics:  [facility]    [facility]   Tuesday 1PM-6PM  [place]  Wednesday 7AM-2PM  [facility]  Thursday 10AM-6PM  [place]  Friday 7AM-3PM  [place]  Specialty schedulers:   [phone] to make an appointment with any Specialty Provider.        Urgent Care locations:    [facility] Monday-Friday 5 pm - 9 pm. Saturday-Sunday 9 am -5pm    Monday-Friday 11 am - 9 pm Saturday 9 am - 5 pm     Monday-Sunday 12 noon-8PM [phone]     If you need a medication refill, please contact us you may need lab work and/or a follow up visit prior to your refill (i.e. Antifungal medications).    [employer] (secure e-mail communication and access to your chart) to send a message or to make an appointment.    If MRI needed please call [facility] at [phone]        Weight management plan: Patient was referred to their PCP to discuss a diet and exercise plan.     Patient to follow up with Primary Care provider regarding elevated blood pressure.

## 2022-05-03 RX ORDER — ATORVASTATIN CALCIUM 40 MG/1
TABLET, FILM COATED ORAL
Qty: 90 TABLET | Refills: 1 | Status: SHIPPED | OUTPATIENT
Start: 2022-05-03

## 2022-05-03 NOTE — TELEPHONE ENCOUNTER
Prescription approved per [facility] Refill Protocol.  Yaritza Powers RN

## 2022-05-17 DIAGNOSIS — L29.9 ITCHING: ICD-10-CM

## 2022-05-18 RX ORDER — DOXEPIN HYDROCHLORIDE 10 MG/1
10 CAPSULE ORAL AT BEDTIME
Qty: 90 CAPSULE | Refills: 0 | Status: SHIPPED | OUTPATIENT
Start: 2022-05-18 | End: 2022-12-13

## 2022-05-18 NOTE — TELEPHONE ENCOUNTER
"Requested Prescriptions   Signed Prescriptions Disp Refills    doxepin (SINEQUAN) 10 MG capsule 90 capsule 0     Sig: Take 1 capsule (10 mg) by mouth At Bedtime       Tricyclic Antidepressants Protocol Passed - 5/17/2022  2:00 AM        Passed - Blood pressure under 140/90 in past 12 months     BP Readings from Last 3 Encounters:   01/26/22 124/64   01/11/22 (!) 144/76   01/07/22 121/76                 Passed - Recent (12 mo) or future (30 days) visit within the authorizing provider's specialty      Patient has had an office visit with the authorizing provider or a provider within the authorizing providers department within the previous 12 mos or has a future within next 30 days. See \"Patient Info\" tab in inbasket, or \"Choose Columns\" in Meds & Orders section of the refill encounter.              Passed - Medication is active on med list        Passed - Patient is age 18 or older        Passed - No active pregnancy on record        Passed - No positive pregnancy test in past 12 months           Deana Cancino RN  [facility]     "

## 2022-08-14 ENCOUNTER — HEALTH MAINTENANCE LETTER (OUTPATIENT)
Age: 67
End: 2022-08-14

## 2022-08-25 DIAGNOSIS — G43.009 MIGRAINE WITHOUT AURA AND WITHOUT STATUS MIGRAINOSUS, NOT INTRACTABLE: ICD-10-CM

## 2022-08-25 RX ORDER — GABAPENTIN 300 MG/1
300 CAPSULE ORAL AT BEDTIME
Qty: 30 CAPSULE | Refills: 0 | Status: SHIPPED | OUTPATIENT
Start: 2022-08-25 | End: 2022-10-10

## 2022-08-25 NOTE — TELEPHONE ENCOUNTER
Refill request: gabapentin (NEURONTIN) 300 MG capsule    Last seen in clinic: 01/26/2022    Follow up appointment: NONE     Patient given 30 days of gabapentin. Letter sent to patient to make a follow up appointment.     Medication T'd for review and signature  Geo Olivier CMA@ on 8/25/2022 at 2:21 PM

## 2022-08-25 NOTE — LETTER
August 25, 2022      Farzana Hughes  [address] [place] [address]            Dear Farzana,     We recently provided you with medication refills.  Many medications require routine follow-up with your doctor.     Your prescription(s) have been refilled for 30 days so you may have time for the above noted follow-up. Please call to schedule soon so we can assure you have an appointment before your next refills are needed. If you have already made a follow up appointment, please disregard this letter.      Sincerely,  [facility]                                 (Formerly known as [facility])        Sincerely,        Geo BURKETT/Nivia Caputo MD

## 2022-10-10 ENCOUNTER — TELEPHONE (OUTPATIENT)
Dept: NEUROLOGY | Facility: CLINIC | Age: 67
End: 2022-10-10

## 2022-10-10 DIAGNOSIS — G43.009 MIGRAINE WITHOUT AURA AND WITHOUT STATUS MIGRAINOSUS, NOT INTRACTABLE: ICD-10-CM

## 2022-10-10 RX ORDER — GABAPENTIN 300 MG/1
300 CAPSULE ORAL AT BEDTIME
Qty: 30 CAPSULE | Refills: 0 | Status: SHIPPED | OUTPATIENT
Start: 2022-10-10

## 2022-10-10 NOTE — TELEPHONE ENCOUNTER
Signed Prescriptions:                        Disp   Refills    gabapentin (NEURONTIN) 300 MG capsule      30 cap*0        Sig: Take 1 capsule (300 mg) by mouth At Bedtime  Authorizing Provider: LINDA DAVE  Ordering User: SHEA CHUNG    Gabapentin refilled for 30 days. Pt has f/u scheduled 10/24/22    Shea Chung RN, BSN  [facility] Neurology

## 2022-10-30 ENCOUNTER — HEALTH MAINTENANCE LETTER (OUTPATIENT)
Age: 67
End: 2022-10-30

## 2022-12-12 DIAGNOSIS — L29.9 ITCHING: ICD-10-CM

## 2022-12-13 RX ORDER — DOXEPIN HYDROCHLORIDE 10 MG/1
CAPSULE ORAL
Qty: 60 CAPSULE | Refills: 0 | Status: SHIPPED | OUTPATIENT
Start: 2022-12-13 | End: 2023-03-17

## 2023-01-11 DIAGNOSIS — E78.5 HYPERLIPIDEMIA LDL GOAL <130: ICD-10-CM

## 2023-01-12 RX ORDER — ATORVASTATIN CALCIUM 40 MG/1
TABLET, FILM COATED ORAL
Qty: 120 TABLET | Refills: 0 | OUTPATIENT
Start: 2023-01-12

## 2023-01-24 NOTE — TELEPHONE ENCOUNTER
Called and spoke to patient. Informed patient to message from Dr. Barakat. Patient verbally understand. Patient stats that she has moved to [place], [place] and since she has switched to a closer clinic there. Patients stats she have told her pharmacy many times to send refills request to new clinic. Advised patient to reach out to pharmacy again to notify of the change.  Therese Whiting CMA

## 2023-02-15 DIAGNOSIS — G43.009 MIGRAINE WITHOUT AURA AND WITHOUT STATUS MIGRAINOSUS, NOT INTRACTABLE: ICD-10-CM

## 2023-02-15 DIAGNOSIS — Z00.00 ROUTINE GENERAL MEDICAL EXAMINATION AT A HEALTH CARE FACILITY: ICD-10-CM

## 2023-02-15 RX ORDER — TOPIRAMATE 50 MG/1
50 TABLET, FILM COATED ORAL 2 TIMES DAILY
Qty: 60 TABLET | Refills: 0 | Status: SHIPPED | OUTPATIENT
Start: 2023-02-15 | End: 2023-05-19

## 2023-02-15 NOTE — TELEPHONE ENCOUNTER
Refill request for: topiramate 50mg  Directions: Take 1 tablet (50 mg) by mouth 2 times daily     LOV: 01/26/22  NOV: No future appt scheduled. Last 2 appts were cancelled. Letter mailed to pt to remind to schedule.    30 day supply with 0 refills Medication T'd for review and signature    Saundra Guardado LPN on 2/15/2023 at 2:29 PM

## 2023-02-15 NOTE — LETTER
2/15/2023        RE: Farzana Hughes  [address]  [place] [address]                Dear Farzana,        We recently provided you with medication refills.  Many medications require routine follow-up with your doctor.    Your prescription(s) have been refilled for 30 days so you may have time for the above noted follow-up. Please call to schedule soon so we can assure you have an appointment before your next refills are needed. If you have already made a follow up appointment, please disregard this letter.           Sincerely,        [facility] [place]     (Formerly known as [facility])  Dr. Caputo Care Team

## 2023-04-08 ENCOUNTER — HEALTH MAINTENANCE LETTER (OUTPATIENT)
Age: 68
End: 2023-04-08

## 2023-05-19 DIAGNOSIS — G43.009 MIGRAINE WITHOUT AURA AND WITHOUT STATUS MIGRAINOSUS, NOT INTRACTABLE: ICD-10-CM

## 2023-05-19 DIAGNOSIS — Z00.00 ROUTINE GENERAL MEDICAL EXAMINATION AT A HEALTH CARE FACILITY: ICD-10-CM

## 2023-05-19 RX ORDER — TOPIRAMATE 50 MG/1
50 TABLET, FILM COATED ORAL 2 TIMES DAILY
Qty: 28 TABLET | Refills: 0 | Status: SHIPPED | OUTPATIENT
Start: 2023-05-19 | End: 2023-06-27

## 2023-05-19 NOTE — TELEPHONE ENCOUNTER
Refill request for: topiramate 50mg  Directions: Take 1 tablet (50 mg) by mouth 2 times daily      LOV: 01/26/22  NOV: No future appt scheduled. Last 2 appts were cancelled. Letter mailed to pt to remind to schedule.     14 day supply with 0 refills Medication T'd for review and signature  Maryan Maurer CMA on 5/19/2023 at 8:50 AM

## 2023-06-01 ENCOUNTER — HEALTH MAINTENANCE LETTER (OUTPATIENT)
Age: 68
End: 2023-06-01

## 2023-06-27 DIAGNOSIS — Z00.00 ROUTINE GENERAL MEDICAL EXAMINATION AT A HEALTH CARE FACILITY: ICD-10-CM

## 2023-06-27 DIAGNOSIS — G43.009 MIGRAINE WITHOUT AURA AND WITHOUT STATUS MIGRAINOSUS, NOT INTRACTABLE: ICD-10-CM

## 2023-06-27 RX ORDER — TOPIRAMATE 50 MG/1
50 TABLET, FILM COATED ORAL 2 TIMES DAILY
Qty: 14 TABLET | Refills: 0 | Status: SHIPPED | OUTPATIENT
Start: 2023-06-27 | End: 2023-07-04

## 2023-06-27 NOTE — TELEPHONE ENCOUNTER
Refill request for: topiramate 50mg  Directions: Take 1 tablet (50 mg) by mouth 2 times daily      LOV: 01/26/22  NOV: No future appt scheduled. Last 2 appts were cancelled. Letter mailed to pt to remind to schedule.     7 day supply with 0 refills Medication T'd for review and signature  Maryan Maurer CMA on 6/27/2023 at 9:09 AM

## 2023-08-16 ENCOUNTER — TELEPHONE (OUTPATIENT)
Dept: FAMILY MEDICINE | Facility: CLINIC | Age: 68
End: 2023-08-16
Payer: COMMERCIAL

## 2023-08-16 NOTE — TELEPHONE ENCOUNTER
Patient Quality Outreach    Patient is due for the following:   Diabetes -  A1C, LDL (Fasting), Microalbumin, Statin, BP Check, Diabetic Follow-Up Visit, and Foot Exam  Asthma  -  Asthma follow-up visit  Hypertension -  Hypertension follow-up visit  Colon Cancer Screening  Breast Cancer Screening - Mammogram  Depression  -  PHQ-9 needed and Depression follow-up visit  Physical Annual Wellness Visit      Topic Date Due    Zoster (Shingles) Vaccine (1 of 2) Never done    Pneumococcal Vaccine (2 - PCV) 10/14/2020    COVID-19 Vaccine (2 - Booster for Isak series) 06/07/2021       Next Steps:   None. Patient transfers care in 09/22 to [facility] in [place] with Daniel Fowler PA-C.     Type of outreach:    none    Next Steps:  Reach out within 90 days via  none .    Max number of attempts reached: Yes. Will try again in 90 days if patient still on fail list.    Questions for provider review:    None           An Henok, [facility]  Chart routed to none.

## 2023-11-09 DIAGNOSIS — Z12.11 COLON CANCER SCREENING: ICD-10-CM

## 2023-11-18 ENCOUNTER — HEALTH MAINTENANCE LETTER (OUTPATIENT)
Age: 68
End: 2023-11-18

## 2023-11-23 ENCOUNTER — LAB (OUTPATIENT)
Dept: FAMILY MEDICINE | Facility: CLINIC | Age: 68
End: 2023-11-23
Payer: COMMERCIAL

## 2023-11-23 DIAGNOSIS — Z12.11 COLON CANCER SCREENING: ICD-10-CM

## 2023-12-06 LAB — NONINV COLON CA DNA+OCC BLD SCRN STL QL: NEGATIVE

## 2024-01-04 NOTE — PATIENT INSTRUCTIONS
Recommendations from today's MTM visit:                                                       1. Sent prescription for Freestyle test strips to [facility] to see if these are covered by your new insurance (otherwise it sounds like you are paying $19 for them).    Follow-up: Return in about 6 months (around 12/2/2021) for Medication Therapy Management.    It was great to speak with you today.  I value your experience and would be very thankful for your time with providing feedback on our clinic survey. You may receive a survey via email or text message in the next few days.     To schedule another MTM appointment, please call the clinic directly or you may call the MTM scheduling line at [phone] or toll-free at [phone].     My Clinical Pharmacist's contact information:                                                      Please feel free to contact me with any questions or concerns you have.      Pamella Roland, PharmD  Medication Therapy Management Pharmacist  [phone]       15

## 2024-02-21 DIAGNOSIS — L29.9 ITCHING: ICD-10-CM

## 2024-02-22 RX ORDER — DOXEPIN HYDROCHLORIDE 10 MG/1
CAPSULE ORAL
Qty: 60 CAPSULE | Refills: 1 | Status: SHIPPED | OUTPATIENT
Start: 2024-02-22 | End: 2024-06-18

## 2024-04-26 NOTE — TELEPHONE ENCOUNTER
Left message for patient that provider denied refill request.  Patient should be seen in clinic if she is experiencing symptoms she feels requires an antibiotic.  Scheduling numbers left for both [facility] and [facility].    Luiza Byers RN    
Requested Prescriptions   Pending Prescriptions Disp Refills     doxycycline hyclate (VIBRAMYCIN) 100 MG capsule 42 capsule 0     Sig: Take 1 capsule (100 mg) by mouth 2 times daily       There is no refill protocol information for this order          
Requested Prescriptions   Pending Prescriptions Disp Refills     doxycycline hyclate (VIBRAMYCIN) 100 MG capsule 42 capsule 0     Sig: Take 1 capsule (100 mg) by mouth 2 times daily       There is no refill protocol information for this order        Routing refill request to provider for review/approval because:  Drug not on the [facility] refill protocol     Clarita Parsons RN    
n/a

## 2024-06-15 ENCOUNTER — HEALTH MAINTENANCE LETTER (OUTPATIENT)
Age: 69
End: 2024-06-15

## 2024-06-18 DIAGNOSIS — L29.9 ITCHING: ICD-10-CM

## 2024-06-18 RX ORDER — DOXEPIN HYDROCHLORIDE 10 MG/1
CAPSULE ORAL
Qty: 60 CAPSULE | Refills: 4 | Status: SHIPPED | OUTPATIENT
Start: 2024-06-18

## 2024-12-29 ENCOUNTER — HEALTH MAINTENANCE LETTER (OUTPATIENT)
Age: 69
End: 2024-12-29

## 2025-01-22 NOTE — TELEPHONE ENCOUNTER
Left message for patient to return call to clinic regarding results. Caroline Pittman, RN      Notes recorded by Murali Logan DO on 7/13/2019 at 11:56 AM CDT  Shellfish blood testing is negative. If you want to proceed with oral food challenge we can do such but I would want to do skin testing with shellfish prior to challenge. I will have nurse call to discuss. Thanks.     Dr. Logan   0 = understands/communicates without difficulty

## 2025-05-18 DIAGNOSIS — L29.9 ITCHING: ICD-10-CM

## 2025-05-19 RX ORDER — DOXEPIN HYDROCHLORIDE 10 MG/1
10 CAPSULE ORAL AT BEDTIME
Qty: 60 CAPSULE | Refills: 0 | OUTPATIENT
Start: 2025-05-19

## 2025-05-28 NOTE — TELEPHONE ENCOUNTER
Last appointment here was in 2022.  I called [facility] pharmacy ([phone]) and they will update their files and let patient know if she wants Dr. Barakat to fill this she will need an appointment, or see if she is seeing another provider who can fill it. Tawana Puri,